# Patient Record
Sex: FEMALE | Race: WHITE | NOT HISPANIC OR LATINO | Employment: FULL TIME | RURAL
[De-identification: names, ages, dates, MRNs, and addresses within clinical notes are randomized per-mention and may not be internally consistent; named-entity substitution may affect disease eponyms.]

---

## 2017-12-10 ENCOUNTER — OFFICE VISIT (OUTPATIENT)
Dept: RETAIL CLINIC | Facility: CLINIC | Age: 27
End: 2017-12-10

## 2017-12-10 DIAGNOSIS — J06.9 ACUTE URI: Primary | ICD-10-CM

## 2017-12-10 LAB
EXPIRATION DATE: NORMAL
EXPIRATION DATE: NORMAL
FLUAV AG NPH QL: NORMAL
FLUBV AG NPH QL: NORMAL
INTERNAL CONTROL: NORMAL
INTERNAL CONTROL: NORMAL
Lab: NORMAL
Lab: NORMAL
S PYO AG THROAT QL: NEGATIVE

## 2017-12-10 PROCEDURE — 99213 OFFICE O/P EST LOW 20 MIN: CPT | Performed by: NURSE PRACTITIONER

## 2017-12-10 PROCEDURE — 87880 STREP A ASSAY W/OPTIC: CPT | Performed by: NURSE PRACTITIONER

## 2017-12-10 PROCEDURE — 87804 INFLUENZA ASSAY W/OPTIC: CPT | Performed by: NURSE PRACTITIONER

## 2017-12-10 NOTE — PROGRESS NOTES
Subjective   Dominique De Dios is a 27 y.o. female.   Chief Complaint   Patient presents with   • Generalized Body Aches   • Fever   • Nausea   • Cough      Fever    This is a new problem. The current episode started yesterday. The problem occurs intermittently. The problem has been waxing and waning. The maximum temperature noted was 103 to 103.9 F. Associated symptoms include congestion, coughing, headaches, muscle aches, nausea and a sore throat. She has tried NSAIDs and acetaminophen for the symptoms. The treatment provided mild relief.   Nausea   This is a new problem. The current episode started yesterday. The problem occurs intermittently. The problem has been waxing and waning. Associated symptoms include congestion, coughing, fatigue, a fever, headaches, myalgias, nausea and a sore throat. She has tried nothing for the symptoms. The treatment provided no relief.   Cough   This is a new problem. The current episode started yesterday. The problem has been gradually worsening. The problem occurs every few minutes. The cough is non-productive. Associated symptoms include a fever, headaches, myalgias, postnasal drip, rhinorrhea and a sore throat. Nothing aggravates the symptoms. She has tried nothing for the symptoms.        The following portions of the patient's history were reviewed and updated as appropriate: allergies, current medications, past family history, past medical history, past social history, past surgical history and problem list.    Review of Systems   Constitutional: Positive for fatigue and fever.   HENT: Positive for congestion, postnasal drip, rhinorrhea and sore throat.    Eyes: Negative.    Respiratory: Positive for cough.    Gastrointestinal: Positive for nausea.   Genitourinary: Negative.    Musculoskeletal: Positive for myalgias.   Skin: Negative.    Allergic/Immunologic: Negative.    Neurological: Positive for headaches.   Psychiatric/Behavioral: Negative.    All other systems reviewed and  are negative.      Objective   Allergies   Allergen Reactions   • Ceclor [Cefaclor] Rash     childhood       Physical Exam   Constitutional: She is oriented to person, place, and time. She appears well-developed and well-nourished. She appears ill.   HENT:   Right Ear: Tympanic membrane normal.   Left Ear: Tympanic membrane normal.   Nose: Mucosal edema present.   Mouth/Throat: Posterior oropharyngeal erythema present. No oropharyngeal exudate.   Eyes: Pupils are equal, round, and reactive to light.   Neck: Neck supple.   Cardiovascular: Normal rate and regular rhythm.    Pulmonary/Chest: Effort normal and breath sounds normal.   Abdominal: Soft. Bowel sounds are normal.   Musculoskeletal: Normal range of motion.   Lymphadenopathy:     She has no cervical adenopathy.   Neurological: She is alert and oriented to person, place, and time.   Skin: Skin is warm and dry.   Psychiatric: She has a normal mood and affect.   Vitals reviewed.      Assessment/Plan   Dominique was seen today for generalized body aches, fever, nausea and cough.    Diagnoses and all orders for this visit:    Acute URI  -     POC Influenza A / B  -     POC Rapid Strep A      Results for orders placed or performed in visit on 12/10/17   POC Influenza A / B   Result Value Ref Range    Rapid Influenza A Ag neg     Rapid Influenza B Ag neg     Internal Control Passed Passed    Lot Number [id number]     Expiration Date 11/2018    POC Rapid Strep A   Result Value Ref Range    Rapid Strep A Screen Negative Negative, VALID, INVALID, Not Performed    Internal Control Passed Passed    Lot Number [id number]     Expiration Date 02/28/2019            Declined prescriptions for symptom relief.       This document has been electronically signed by KANDY Constantino December 10, 2017 1:50 PM

## 2017-12-10 NOTE — PATIENT INSTRUCTIONS
Viral Respiratory Infection  A viral respiratory infection is an illness that affects parts of the body used for breathing, like the lungs, nose, and throat. It is caused by a germ called a virus.  Some examples of this kind of infection are:  · A cold.  · The flu (influenza).  · A respiratory syncytial virus (RSV) infection.  HOW DO I KNOW IF I HAVE THIS INFECTION?  Most of the time this infection causes:  · A stuffy or runny nose.  · Yellow or green fluid in the nose.  · A cough.  · Sneezing.  · Tiredness (fatigue).  · Achy muscles.  · A sore throat.  · Sweating or chills.  · A fever.  · A headache.  HOW IS THIS INFECTION TREATED?  If the flu is diagnosed early, it may be treated with an antiviral medicine. This medicine shortens the length of time a person has symptoms. Symptoms may be treated with over-the-counter and prescription medicines, such as:  · Expectorants. These make it easier to cough up mucus.  · Decongestant nasal sprays.  Doctors do not prescribe antibiotic medicines for viral infections. They do not work with this kind of infection.  HOW DO I KNOW IF I SHOULD STAY HOME?  To keep others from getting sick, stay home if you have:  · A fever.  · A lasting cough.  · A sore throat.  · A runny nose.  · Sneezing.  · Muscles aches.  · Headaches.  · Tiredness.  · Weakness.  · Chills.  · Sweating.  · An upset stomach (nausea).  HOME CARE   · Rest as much as possible.  · Take over-the-counter and prescription medicines only as told by your doctor.  · Drink enough fluid to keep your pee (urine) clear or pale yellow.  · Gargle with salt water. Do this 3-4 times per day or as needed. To make a salt-water mixture, dissolve ½-1 tsp of salt in 1 cup of warm water. Make sure the salt dissolves all the way.  · Use nose drops made from salt water. This helps with stuffiness (congestion). It also helps soften the skin around your nose.  · Do not drink alcohol.  · Do not use tobacco products, including cigarettes,  chewing tobacco, and e-cigarettes. If you need help quitting, ask your doctor.  GET HELP IF:  · Your symptoms last for 10 days or longer.  · Your symptoms get worse over time.  · You have a fever.  · You have very bad pain in your face or forehead.  · Parts of your jaw or neck become very swollen.  GET HELP RIGHT AWAY IF:  · You feel pain or pressure in your chest.  · You have shortness of breath.  · You faint or feel like you will faint.  · You keep throwing up (vomiting).  · You feel confused.     This information is not intended to replace advice given to you by your health care provider. Make sure you discuss any questions you have with your health care provider.     Document Released: 11/30/2009 Document Revised: 04/10/2017 Document Reviewed: 05/25/2016  [employer] Interactive Patient Education ©2017 [employer]

## 2019-02-26 ENCOUNTER — TRANSCRIBE ORDERS (OUTPATIENT)
Dept: ADMINISTRATIVE | Facility: HOSPITAL | Age: 29
End: 2019-02-26

## 2019-02-26 DIAGNOSIS — R10.9 ABDOMINAL PAIN, UNSPECIFIED ABDOMINAL LOCATION: Primary | ICD-10-CM

## 2019-03-20 ENCOUNTER — APPOINTMENT (OUTPATIENT)
Dept: CT IMAGING | Facility: HOSPITAL | Age: 29
End: 2019-03-20

## 2019-03-20 ENCOUNTER — HOSPITAL ENCOUNTER (OUTPATIENT)
Facility: HOSPITAL | Age: 29
Setting detail: OBSERVATION
Discharge: HOME | End: 2019-03-21
Attending: FAMILY MEDICINE | Admitting: FAMILY MEDICINE

## 2019-03-20 DIAGNOSIS — R55 SYNCOPE, UNSPECIFIED SYNCOPE TYPE: Primary | ICD-10-CM

## 2019-03-20 LAB
6-ACETYL MORPHINE: NEGATIVE
A-A DO2: 28.5 MMHG (ref 0–300)
ALBUMIN SERPL-MCNC: 4.54 G/DL (ref 3.5–5.2)
ALBUMIN SERPL-MCNC: 4.82 G/DL (ref 3.5–5.2)
ALBUMIN/GLOB SERPL: 1.5 G/DL
ALBUMIN/GLOB SERPL: 1.6 G/DL
ALP SERPL-CCNC: 42 U/L (ref 39–117)
ALP SERPL-CCNC: 46 U/L (ref 39–117)
ALT SERPL W P-5'-P-CCNC: 9 U/L (ref 1–33)
ALT SERPL W P-5'-P-CCNC: 9 U/L (ref 1–33)
AMPHET+METHAMPHET UR QL: NEGATIVE
ANION GAP SERPL CALCULATED.3IONS-SCNC: 14.2 MMOL/L
ANION GAP SERPL CALCULATED.3IONS-SCNC: 16.6 MMOL/L
APTT PPP: 26.4 SECONDS (ref 23.8–36.1)
ARTERIAL PATENCY WRIST A: ABNORMAL
AST SERPL-CCNC: 16 U/L (ref 1–32)
AST SERPL-CCNC: 18 U/L (ref 1–32)
ATMOSPHERIC PRESS: 733 MMHG
BARBITURATES UR QL SCN: NEGATIVE
BASE EXCESS BLDA CALC-SCNC: -1.3 MMOL/L
BASOPHILS # BLD AUTO: 0.02 10*3/MM3 (ref 0–0.2)
BASOPHILS NFR BLD AUTO: 0.2 % (ref 0–1.5)
BDY SITE: ABNORMAL
BENZODIAZ UR QL SCN: NEGATIVE
BILIRUB SERPL-MCNC: 0.4 MG/DL (ref 0.2–1.2)
BILIRUB SERPL-MCNC: 0.4 MG/DL (ref 0.2–1.2)
BILIRUB UR QL STRIP: NEGATIVE
BODY TEMPERATURE: 98.6 C
BUN BLD-MCNC: 9 MG/DL (ref 6–20)
BUN BLD-MCNC: 9 MG/DL (ref 6–20)
BUN/CREAT SERPL: 10.7 (ref 7–25)
BUN/CREAT SERPL: 11.1 (ref 7–25)
BUPRENORPHINE SERPL-MCNC: NEGATIVE NG/ML
CALCIUM SPEC-SCNC: 9.5 MG/DL (ref 8.6–10.5)
CALCIUM SPEC-SCNC: 9.6 MG/DL (ref 8.6–10.5)
CANNABINOIDS SERPL QL: NEGATIVE
CHLORIDE SERPL-SCNC: 100 MMOL/L (ref 98–107)
CHLORIDE SERPL-SCNC: 103 MMOL/L (ref 98–107)
CLARITY UR: CLEAR
CO2 SERPL-SCNC: 20.4 MMOL/L (ref 22–29)
CO2 SERPL-SCNC: 20.8 MMOL/L (ref 22–29)
COCAINE UR QL: NEGATIVE
COHGB MFR BLD: 0.4 % (ref 0–5)
COLOR UR: YELLOW
CREAT BLD-MCNC: 0.81 MG/DL (ref 0.57–1)
CREAT BLD-MCNC: 0.84 MG/DL (ref 0.57–1)
CRP SERPL-MCNC: 0.45 MG/DL (ref 0–0.5)
DEPRECATED RDW RBC AUTO: 40.8 FL (ref 37–54)
EOSINOPHIL # BLD AUTO: 0.01 10*3/MM3 (ref 0–0.4)
EOSINOPHIL NFR BLD AUTO: 0.1 % (ref 0.3–6.2)
ERYTHROCYTE [DISTWIDTH] IN BLOOD BY AUTOMATED COUNT: 12.3 % (ref 12.3–15.4)
GFR SERPL CREATININE-BSD FRML MDRD: 81 ML/MIN/1.73
GFR SERPL CREATININE-BSD FRML MDRD: 84 ML/MIN/1.73
GLOBULIN UR ELPH-MCNC: 3 GM/DL
GLOBULIN UR ELPH-MCNC: 3.1 GM/DL
GLUCOSE BLD-MCNC: 102 MG/DL (ref 65–99)
GLUCOSE BLD-MCNC: 116 MG/DL (ref 65–99)
GLUCOSE UR STRIP-MCNC: NEGATIVE MG/DL
HCG SERPL QL: NEGATIVE
HCO3 BLDA-SCNC: 21 MMOL/L (ref 22–26)
HCT VFR BLD AUTO: 42.8 % (ref 34–46.6)
HCT VFR BLD CALC: 41 % (ref 37–47)
HGB BLD-MCNC: 14.4 G/DL (ref 12–15.9)
HGB BLDA-MCNC: 14.1 G/DL (ref 12–16)
HGB UR QL STRIP.AUTO: NEGATIVE
HOROWITZ INDEX BLD+IHG-RTO: 21 %
IMM GRANULOCYTES # BLD AUTO: 0.01 10*3/MM3 (ref 0–0.05)
IMM GRANULOCYTES NFR BLD AUTO: 0.1 % (ref 0–0.5)
INR PPP: 0.96 (ref 0.9–1.1)
KETONES UR QL STRIP: ABNORMAL
LEUKOCYTE ESTERASE UR QL STRIP.AUTO: NEGATIVE
LIPASE SERPL-CCNC: 34 U/L (ref 13–60)
LYMPHOCYTES # BLD AUTO: 1.92 10*3/MM3 (ref 0.7–3.1)
LYMPHOCYTES NFR BLD AUTO: 18.4 % (ref 19.6–45.3)
MCH RBC QN AUTO: 30.5 PG (ref 26.6–33)
MCHC RBC AUTO-ENTMCNC: 33.6 G/DL (ref 31.5–35.7)
MCV RBC AUTO: 90.7 FL (ref 79–97)
METHADONE UR QL SCN: NEGATIVE
METHGB BLD QL: 0.4 % (ref 0–3)
MODALITY: ABNORMAL
MONOCYTES # BLD AUTO: 0.47 10*3/MM3 (ref 0.1–0.9)
MONOCYTES NFR BLD AUTO: 4.5 % (ref 5–12)
NEUTROPHILS # BLD AUTO: 8 10*3/MM3 (ref 1.4–7)
NEUTROPHILS NFR BLD AUTO: 76.7 % (ref 42.7–76)
NITRITE UR QL STRIP: NEGATIVE
OPIATES UR QL: NEGATIVE
OXYCODONE UR QL SCN: NEGATIVE
OXYHGB MFR BLDV: 95 % (ref 85–100)
PCO2 BLDA: 29.1 MM HG (ref 35–45)
PCP UR QL SCN: NEGATIVE
PH BLDA: 7.48 PH UNITS (ref 7.35–7.45)
PH UR STRIP.AUTO: 7.5 [PH] (ref 5–8)
PLATELET # BLD AUTO: 343 10*3/MM3 (ref 140–450)
PMV BLD AUTO: 10 FL (ref 6–12)
PO2 BLDA: 80.7 MM HG (ref 80–100)
POTASSIUM BLD-SCNC: 3.5 MMOL/L (ref 3.5–5.2)
POTASSIUM BLD-SCNC: 3.5 MMOL/L (ref 3.5–5.2)
PROT SERPL-MCNC: 7.5 G/DL (ref 6–8.5)
PROT SERPL-MCNC: 7.9 G/DL (ref 6–8.5)
PROT UR QL STRIP: NEGATIVE
PROTHROMBIN TIME: 13 SECONDS (ref 11–15.4)
RBC # BLD AUTO: 4.72 10*6/MM3 (ref 3.77–5.28)
SAO2 % BLDCOA: 95.8 % (ref 90–100)
SODIUM BLD-SCNC: 137 MMOL/L (ref 136–145)
SODIUM BLD-SCNC: 138 MMOL/L (ref 136–145)
SP GR UR STRIP: 1.01 (ref 1–1.03)
TROPONIN T SERPL-MCNC: <0.01 NG/ML (ref 0–0.03)
TROPONIN T SERPL-MCNC: <0.01 NG/ML (ref 0–0.03)
TSH SERPL DL<=0.05 MIU/L-ACNC: 1.61 MIU/ML (ref 0.27–4.2)
UROBILINOGEN UR QL STRIP: ABNORMAL
WBC NRBC COR # BLD: 10.43 10*3/MM3 (ref 3.4–10.8)

## 2019-03-20 PROCEDURE — 81003 URINALYSIS AUTO W/O SCOPE: CPT | Performed by: FAMILY MEDICINE

## 2019-03-20 PROCEDURE — 93010 ELECTROCARDIOGRAM REPORT: CPT | Performed by: INTERNAL MEDICINE

## 2019-03-20 PROCEDURE — 80307 DRUG TEST PRSMV CHEM ANLYZR: CPT | Performed by: FAMILY MEDICINE

## 2019-03-20 PROCEDURE — 85025 COMPLETE CBC W/AUTO DIFF WBC: CPT | Performed by: FAMILY MEDICINE

## 2019-03-20 PROCEDURE — 86140 C-REACTIVE PROTEIN: CPT | Performed by: FAMILY MEDICINE

## 2019-03-20 PROCEDURE — 83050 HGB METHEMOGLOBIN QUAN: CPT | Performed by: FAMILY MEDICINE

## 2019-03-20 PROCEDURE — 83690 ASSAY OF LIPASE: CPT | Performed by: FAMILY MEDICINE

## 2019-03-20 PROCEDURE — 36600 WITHDRAWAL OF ARTERIAL BLOOD: CPT | Performed by: FAMILY MEDICINE

## 2019-03-20 PROCEDURE — 82805 BLOOD GASES W/O2 SATURATION: CPT | Performed by: FAMILY MEDICINE

## 2019-03-20 PROCEDURE — 94799 UNLISTED PULMONARY SVC/PX: CPT

## 2019-03-20 PROCEDURE — 82375 ASSAY CARBOXYHB QUANT: CPT | Performed by: FAMILY MEDICINE

## 2019-03-20 PROCEDURE — 25010000002 ONDANSETRON PER 1 MG: Performed by: FAMILY MEDICINE

## 2019-03-20 PROCEDURE — G0378 HOSPITAL OBSERVATION PER HR: HCPCS

## 2019-03-20 PROCEDURE — 70450 CT HEAD/BRAIN W/O DYE: CPT | Performed by: RADIOLOGY

## 2019-03-20 PROCEDURE — 25010000002 ENOXAPARIN PER 10 MG: Performed by: PHYSICIAN ASSISTANT

## 2019-03-20 PROCEDURE — 80053 COMPREHEN METABOLIC PANEL: CPT | Performed by: FAMILY MEDICINE

## 2019-03-20 PROCEDURE — 96376 TX/PRO/DX INJ SAME DRUG ADON: CPT

## 2019-03-20 PROCEDURE — 84443 ASSAY THYROID STIM HORMONE: CPT | Performed by: FAMILY MEDICINE

## 2019-03-20 PROCEDURE — 96361 HYDRATE IV INFUSION ADD-ON: CPT

## 2019-03-20 PROCEDURE — 93005 ELECTROCARDIOGRAM TRACING: CPT | Performed by: FAMILY MEDICINE

## 2019-03-20 PROCEDURE — 96375 TX/PRO/DX INJ NEW DRUG ADDON: CPT

## 2019-03-20 PROCEDURE — 25010000002 LORAZEPAM PER 2 MG: Performed by: FAMILY MEDICINE

## 2019-03-20 PROCEDURE — 70450 CT HEAD/BRAIN W/O DYE: CPT

## 2019-03-20 PROCEDURE — 99285 EMERGENCY DEPT VISIT HI MDM: CPT

## 2019-03-20 PROCEDURE — 84703 CHORIONIC GONADOTROPIN ASSAY: CPT | Performed by: FAMILY MEDICINE

## 2019-03-20 PROCEDURE — 85730 THROMBOPLASTIN TIME PARTIAL: CPT | Performed by: FAMILY MEDICINE

## 2019-03-20 PROCEDURE — 93005 ELECTROCARDIOGRAM TRACING: CPT | Performed by: EMERGENCY MEDICINE

## 2019-03-20 PROCEDURE — 25010000002 LORAZEPAM PER 2 MG: Performed by: PHYSICIAN ASSISTANT

## 2019-03-20 PROCEDURE — 96374 THER/PROPH/DIAG INJ IV PUSH: CPT

## 2019-03-20 PROCEDURE — 85610 PROTHROMBIN TIME: CPT | Performed by: FAMILY MEDICINE

## 2019-03-20 PROCEDURE — 84484 ASSAY OF TROPONIN QUANT: CPT | Performed by: FAMILY MEDICINE

## 2019-03-20 RX ORDER — ONDANSETRON 2 MG/ML
4 INJECTION INTRAMUSCULAR; INTRAVENOUS ONCE
Status: COMPLETED | OUTPATIENT
Start: 2019-03-20 | End: 2019-03-20

## 2019-03-20 RX ORDER — ONDANSETRON 4 MG/1
4 TABLET, FILM COATED ORAL EVERY 6 HOURS PRN
Status: DISCONTINUED | OUTPATIENT
Start: 2019-03-20 | End: 2019-03-21 | Stop reason: HOSPADM

## 2019-03-20 RX ORDER — ONDANSETRON 2 MG/ML
4 INJECTION INTRAMUSCULAR; INTRAVENOUS EVERY 6 HOURS PRN
Status: DISCONTINUED | OUTPATIENT
Start: 2019-03-20 | End: 2019-03-21 | Stop reason: HOSPADM

## 2019-03-20 RX ORDER — LORAZEPAM 2 MG/ML
0.25 INJECTION INTRAMUSCULAR ONCE
Status: COMPLETED | OUTPATIENT
Start: 2019-03-20 | End: 2019-03-20

## 2019-03-20 RX ORDER — ONDANSETRON 4 MG/1
4 TABLET, ORALLY DISINTEGRATING ORAL EVERY 6 HOURS PRN
Status: DISCONTINUED | OUTPATIENT
Start: 2019-03-20 | End: 2019-03-21 | Stop reason: HOSPADM

## 2019-03-20 RX ORDER — SODIUM CHLORIDE 0.9 % (FLUSH) 0.9 %
10 SYRINGE (ML) INJECTION AS NEEDED
Status: DISCONTINUED | OUTPATIENT
Start: 2019-03-20 | End: 2019-03-21 | Stop reason: HOSPADM

## 2019-03-20 RX ORDER — FAMOTIDINE 20 MG/1
20 TABLET, FILM COATED ORAL 2 TIMES DAILY PRN
Status: DISCONTINUED | OUTPATIENT
Start: 2019-03-20 | End: 2019-03-21 | Stop reason: HOSPADM

## 2019-03-20 RX ORDER — SODIUM CHLORIDE 0.9 % (FLUSH) 0.9 %
3 SYRINGE (ML) INJECTION EVERY 12 HOURS SCHEDULED
Status: DISCONTINUED | OUTPATIENT
Start: 2019-03-20 | End: 2019-03-21 | Stop reason: HOSPADM

## 2019-03-20 RX ORDER — SODIUM CHLORIDE 9 MG/ML
125 INJECTION, SOLUTION INTRAVENOUS CONTINUOUS
Status: DISCONTINUED | OUTPATIENT
Start: 2019-03-20 | End: 2019-03-21 | Stop reason: HOSPADM

## 2019-03-20 RX ORDER — SODIUM CHLORIDE 0.9 % (FLUSH) 0.9 %
3-10 SYRINGE (ML) INJECTION AS NEEDED
Status: DISCONTINUED | OUTPATIENT
Start: 2019-03-20 | End: 2019-03-21 | Stop reason: HOSPADM

## 2019-03-20 RX ORDER — NITROGLYCERIN 0.4 MG/1
0.4 TABLET SUBLINGUAL
Status: DISCONTINUED | OUTPATIENT
Start: 2019-03-20 | End: 2019-03-21 | Stop reason: HOSPADM

## 2019-03-20 RX ORDER — LORAZEPAM 2 MG/ML
0.5 INJECTION INTRAMUSCULAR EVERY 6 HOURS PRN
Status: DISCONTINUED | OUTPATIENT
Start: 2019-03-20 | End: 2019-03-21 | Stop reason: HOSPADM

## 2019-03-20 RX ADMIN — SODIUM CHLORIDE 1000 ML: 9 INJECTION, SOLUTION INTRAVENOUS at 15:06

## 2019-03-20 RX ADMIN — ENOXAPARIN SODIUM 40 MG: 40 INJECTION SUBCUTANEOUS at 20:31

## 2019-03-20 RX ADMIN — ONDANSETRON 4 MG: 2 INJECTION, SOLUTION INTRAMUSCULAR; INTRAVENOUS at 15:08

## 2019-03-20 RX ADMIN — SODIUM CHLORIDE 125 ML/HR: 9 INJECTION, SOLUTION INTRAVENOUS at 15:57

## 2019-03-20 RX ADMIN — LORAZEPAM 0.25 MG: 2 INJECTION INTRAMUSCULAR; INTRAVENOUS at 15:11

## 2019-03-20 RX ADMIN — LORAZEPAM 0.5 MG: 2 INJECTION INTRAMUSCULAR; INTRAVENOUS at 20:31

## 2019-03-20 NOTE — PLAN OF CARE
Problem: Syncope (Adult)  Goal: Identify Related Risk Factors and Signs and Symptoms  Outcome: Ongoing (interventions implemented as appropriate)    Goal: Physical Safety/Health Maintenance  Outcome: Ongoing (interventions implemented as appropriate)    Goal: Optimal Emotional/Functional Collins  Outcome: Ongoing (interventions implemented as appropriate)      Problem: Patient Care Overview  Goal: Plan of Care Review  Outcome: Ongoing (interventions implemented as appropriate)    Goal: Individualization and Mutuality  Outcome: Ongoing (interventions implemented as appropriate)    Goal: Discharge Needs Assessment  Outcome: Ongoing (interventions implemented as appropriate)    Goal: Interprofessional Rounds/Family Conf  Outcome: Ongoing (interventions implemented as appropriate)

## 2019-03-20 NOTE — ED PROVIDER NOTES
Subjective   29 yo female presents to ED after 2 witnessed syncopal episodes.  Mother reports that pt has syncopal episodes  Already - vasovagal. Mother says that pt is under extreme stress at home -  , work and has a 3 yo, but these events are becoming more frequent and pt today she says is not acting like her self- acting confused. Mother notes thyroid nodule which is new; pt states that yesterday she went to be evaluated by the school nurse for a sore throat - strep was negative.  Pt is very tearful. Pt has an episode  And pt is very tearful when she wakes up. Mother and sister report that she has been having these episodes of passing out for years but it had stopped last one was 2 years ago.-- when she was pregnant. Mother says when she was a small child she took her to a pediatric cardiologist who told her that her workup was unremarkable.        History provided by:  Patient and relative  Syncope   Episode history:  Multiple  Most recent episode:  Today  Timing:  Intermittent  Progression:  Partially resolved  Chronicity:  Recurrent  Context: exertion and sitting down    Witnessed: yes    Relieved by:  Bed rest  Worsened by:  Nothing  Ineffective treatments:  Bed rest  Associated symptoms: anxiety and dizziness    Associated symptoms: no chest pain and no fever    Risk factors: no congenital heart disease, no coronary artery disease, no seizures and no vascular disease        Review of Systems   Constitutional: Negative.  Negative for fever.   HENT: Negative.    Respiratory: Negative.    Cardiovascular: Positive for syncope. Negative for chest pain.   Gastrointestinal: Negative.  Negative for abdominal pain.   Endocrine: Negative.    Genitourinary: Negative.  Negative for dysuria.   Skin: Negative.    Neurological: Positive for dizziness.   Psychiatric/Behavioral: Negative.    All other systems reviewed and are negative.      Past Medical History:   Diagnosis Date   • Acid reflux    • Anxiety    •  Migraine    • Migraines    • Urinary tract infection        Allergies   Allergen Reactions   • Ceclor [Cefaclor] Rash     childhood       Past Surgical History:   Procedure Laterality Date   •  SECTION Bilateral 2016    Procedure:  SECTION PRIMARY;  Surgeon: Valdez Bach DO;  Location:  [facility];  Service:        Family History   Problem Relation Age of Onset   • Stroke Father        Social History     Socioeconomic History   • Marital status:      Spouse name: Not on file   • Number of children: Not on file   • Years of education: Not on file   • Highest education level: Not on file   Tobacco Use   • Smoking status: Never Smoker   • Smokeless tobacco: Never Used   Substance and Sexual Activity   • Alcohol use: No   • Drug use: No   • Sexual activity: Yes     Partners: Male           Objective   Physical Exam   Constitutional: She appears well-developed and well-nourished.   HENT:   Head: Normocephalic and atraumatic.   Mouth/Throat: Oropharynx is clear and moist.   Eyes: Pupils are equal, round, and reactive to light.   Neck:   Palpable thyroid enlargement on left   Cardiovascular: Normal rate and regular rhythm.   Pulmonary/Chest: Effort normal and breath sounds normal.   Abdominal: Soft. Bowel sounds are normal.   Musculoskeletal: Normal range of motion.   Neurological: She is alert. GCS eye subscore is 4. GCS verbal subscore is 4.   Skin: Skin is warm. Capillary refill takes less than 2 seconds.   Psychiatric: Her mood appears anxious.   Nursing note and vitals reviewed.      Procedures           ED Course  ED Course as of Mar 21 0000   Wed Mar 20, 2019   1355 Time 1355 sinus tachycardia 101 bpm QRS duration is 76 QT is 362 QTC is 469 nonspecific T wave changes no evidence of acute ST elevation at this time  []   1636 TSH Baseline: 1.610 []      ED Course User Index  [] Marina Trinh DO MDM  Number of Diagnoses or Management  Options  Syncope, unspecified syncope type: new and requires workup     Amount and/or Complexity of Data Reviewed  Clinical lab tests: ordered and reviewed  Tests in the radiology section of CPT®: ordered and reviewed  Tests in the medicine section of CPT®: reviewed and ordered  Discuss the patient with other providers: yes  Independent visualization of images, tracings, or specimens: yes    Risk of Complications, Morbidity, and/or Mortality  Presenting problems: high  Diagnostic procedures: high  Management options: high    Patient Progress  Patient progress: (guarded)        Final diagnoses:   Syncope, unspecified syncope type            Marina Trinh DO  03/21/19 0000

## 2019-03-20 NOTE — ED NOTES
Ns noted, vs stable, pt alert and oriented, skin pwd, no respiratory distress, no seizure activity noted, no syncope while in ed. Pt denies pain, pt resting on stretcher, poc updated, fall precautions maintained.     Verito Romero, MARSHAL  03/20/19 2707

## 2019-03-21 ENCOUNTER — APPOINTMENT (OUTPATIENT)
Dept: ULTRASOUND IMAGING | Facility: HOSPITAL | Age: 29
End: 2019-03-21

## 2019-03-21 ENCOUNTER — APPOINTMENT (OUTPATIENT)
Dept: CARDIOLOGY | Facility: HOSPITAL | Age: 29
End: 2019-03-21

## 2019-03-21 VITALS
OXYGEN SATURATION: 98 % | HEART RATE: 56 BPM | DIASTOLIC BLOOD PRESSURE: 70 MMHG | WEIGHT: 100 LBS | TEMPERATURE: 98.8 F | HEIGHT: 65 IN | SYSTOLIC BLOOD PRESSURE: 134 MMHG | RESPIRATION RATE: 18 BRPM | BODY MASS INDEX: 16.66 KG/M2

## 2019-03-21 PROBLEM — R55 SYNCOPE: Status: RESOLVED | Noted: 2019-03-20 | Resolved: 2019-03-21

## 2019-03-21 LAB
ALBUMIN SERPL-MCNC: 3.21 G/DL (ref 3.5–5.2)
ALBUMIN/GLOB SERPL: 1.5 G/DL
ALP SERPL-CCNC: 32 U/L (ref 39–117)
ALT SERPL W P-5'-P-CCNC: 6 U/L (ref 1–33)
ANION GAP SERPL CALCULATED.3IONS-SCNC: 9.3 MMOL/L
AST SERPL-CCNC: 12 U/L (ref 1–32)
BASOPHILS # BLD AUTO: 0.03 10*3/MM3 (ref 0–0.2)
BASOPHILS NFR BLD AUTO: 0.5 % (ref 0–1.5)
BH CV ECHO MEAS - % IVS THICK: 68.1 %
BH CV ECHO MEAS - % LVPW THICK: 32.9 %
BH CV ECHO MEAS - ACS: 1.7 CM
BH CV ECHO MEAS - AO ROOT AREA (BSA CORRECTED): 1.6
BH CV ECHO MEAS - AO ROOT AREA: 4.4 CM^2
BH CV ECHO MEAS - AO ROOT DIAM: 2.4 CM
BH CV ECHO MEAS - BSA(HAYCOCK): 1.4 M^2
BH CV ECHO MEAS - BSA: 1.5 M^2
BH CV ECHO MEAS - BZI_BMI: 16.6 KILOGRAMS/M^2
BH CV ECHO MEAS - BZI_METRIC_HEIGHT: 165.1 CM
BH CV ECHO MEAS - BZI_METRIC_WEIGHT: 45.4 KG
BH CV ECHO MEAS - EDV(CUBED): 63.5 ML
BH CV ECHO MEAS - EDV(MOD-SP4): 35 ML
BH CV ECHO MEAS - EDV(TEICH): 69.6 ML
BH CV ECHO MEAS - EF(CUBED): 71.3 %
BH CV ECHO MEAS - EF(MOD-SP4): 77.1 %
BH CV ECHO MEAS - EF(TEICH): 63.6 %
BH CV ECHO MEAS - ESV(CUBED): 18.2 ML
BH CV ECHO MEAS - ESV(MOD-SP4): 8 ML
BH CV ECHO MEAS - ESV(TEICH): 25.3 ML
BH CV ECHO MEAS - FS: 34.1 %
BH CV ECHO MEAS - IVS/LVPW: 1
BH CV ECHO MEAS - IVSD: 0.7 CM
BH CV ECHO MEAS - IVSS: 1.2 CM
BH CV ECHO MEAS - LA DIMENSION: 2.4 CM
BH CV ECHO MEAS - LA/AO: 1
BH CV ECHO MEAS - LV DIASTOLIC VOL/BSA (35-75): 23.8 ML/M^2
BH CV ECHO MEAS - LV MASS(C)D: 78.5 GRAMS
BH CV ECHO MEAS - LV MASS(C)DI: 53.3 GRAMS/M^2
BH CV ECHO MEAS - LV MASS(C)S: 74.5 GRAMS
BH CV ECHO MEAS - LV MASS(C)SI: 50.6 GRAMS/M^2
BH CV ECHO MEAS - LV SYSTOLIC VOL/BSA (12-30): 5.4 ML/M^2
BH CV ECHO MEAS - LVIDD: 4 CM
BH CV ECHO MEAS - LVIDS: 2.6 CM
BH CV ECHO MEAS - LVLD AP4: 8 CM
BH CV ECHO MEAS - LVLS AP4: 5.6 CM
BH CV ECHO MEAS - LVOT AREA (M): 2.3 CM^2
BH CV ECHO MEAS - LVOT AREA: 2.4 CM^2
BH CV ECHO MEAS - LVOT DIAM: 1.7 CM
BH CV ECHO MEAS - LVPWD: 0.7 CM
BH CV ECHO MEAS - LVPWS: 0.93 CM
BH CV ECHO MEAS - MV A MAX VEL: 77 CM/SEC
BH CV ECHO MEAS - MV E MAX VEL: 106.1 CM/SEC
BH CV ECHO MEAS - MV E/A: 1.4
BH CV ECHO MEAS - PA ACC SLOPE: 925.2 CM/SEC^2
BH CV ECHO MEAS - PA ACC TIME: 0.14 SEC
BH CV ECHO MEAS - PA PR(ACCEL): 15.6 MMHG
BH CV ECHO MEAS - RVDD: 1.3 CM
BH CV ECHO MEAS - SI(CUBED): 30.8 ML/M^2
BH CV ECHO MEAS - SI(MOD-SP4): 18.3 ML/M^2
BH CV ECHO MEAS - SI(TEICH): 30 ML/M^2
BH CV ECHO MEAS - SV(CUBED): 45.3 ML
BH CV ECHO MEAS - SV(MOD-SP4): 27 ML
BH CV ECHO MEAS - SV(TEICH): 44.3 ML
BILIRUB SERPL-MCNC: 0.2 MG/DL (ref 0.2–1.2)
BUN BLD-MCNC: 10 MG/DL (ref 6–20)
BUN/CREAT SERPL: 13.2 (ref 7–25)
CALCIUM SPEC-SCNC: 8 MG/DL (ref 8.6–10.5)
CHLORIDE SERPL-SCNC: 109 MMOL/L (ref 98–107)
CHOLEST SERPL-MCNC: 136 MG/DL (ref 0–200)
CO2 SERPL-SCNC: 18.7 MMOL/L (ref 22–29)
CREAT BLD-MCNC: 0.76 MG/DL (ref 0.57–1)
DEPRECATED RDW RBC AUTO: 40.9 FL (ref 37–54)
EOSINOPHIL # BLD AUTO: 0.08 10*3/MM3 (ref 0–0.4)
EOSINOPHIL NFR BLD AUTO: 1.2 % (ref 0.3–6.2)
ERYTHROCYTE [DISTWIDTH] IN BLOOD BY AUTOMATED COUNT: 12.4 % (ref 12.3–15.4)
GFR SERPL CREATININE-BSD FRML MDRD: 91 ML/MIN/1.73
GLOBULIN UR ELPH-MCNC: 2.2 GM/DL
GLUCOSE BLD-MCNC: 95 MG/DL (ref 65–99)
HCT VFR BLD AUTO: 35.7 % (ref 34–46.6)
HDLC SERPL-MCNC: 49 MG/DL (ref 40–60)
HGB BLD-MCNC: 11.4 G/DL (ref 12–15.9)
IMM GRANULOCYTES # BLD AUTO: 0.01 10*3/MM3 (ref 0–0.05)
IMM GRANULOCYTES NFR BLD AUTO: 0.2 % (ref 0–0.5)
LDLC SERPL CALC-MCNC: 75 MG/DL (ref 0–100)
LDLC/HDLC SERPL: 1.52 {RATIO}
LV EF 2D ECHO EST: 65 %
LYMPHOCYTES # BLD AUTO: 3 10*3/MM3 (ref 0.7–3.1)
LYMPHOCYTES NFR BLD AUTO: 46.7 % (ref 19.6–45.3)
MAXIMAL PREDICTED HEART RATE: 192 BPM
MCH RBC QN AUTO: 29.9 PG (ref 26.6–33)
MCHC RBC AUTO-ENTMCNC: 31.9 G/DL (ref 31.5–35.7)
MCV RBC AUTO: 93.7 FL (ref 79–97)
MONOCYTES # BLD AUTO: 0.47 10*3/MM3 (ref 0.1–0.9)
MONOCYTES NFR BLD AUTO: 7.3 % (ref 5–12)
NEUTROPHILS # BLD AUTO: 2.83 10*3/MM3 (ref 1.4–7)
NEUTROPHILS NFR BLD AUTO: 44.1 % (ref 42.7–76)
PLATELET # BLD AUTO: 244 10*3/MM3 (ref 140–450)
PMV BLD AUTO: 9.5 FL (ref 6–12)
POTASSIUM BLD-SCNC: 4 MMOL/L (ref 3.5–5.2)
PROT SERPL-MCNC: 5.4 G/DL (ref 6–8.5)
RBC # BLD AUTO: 3.81 10*6/MM3 (ref 3.77–5.28)
SODIUM BLD-SCNC: 137 MMOL/L (ref 136–145)
STRESS TARGET HR: 163 BPM
TRIGL SERPL-MCNC: 62 MG/DL (ref 0–150)
VLDLC SERPL-MCNC: 12.4 MG/DL
WBC NRBC COR # BLD: 6.42 10*3/MM3 (ref 3.4–10.8)

## 2019-03-21 PROCEDURE — 96361 HYDRATE IV INFUSION ADD-ON: CPT

## 2019-03-21 PROCEDURE — 93306 TTE W/DOPPLER COMPLETE: CPT | Performed by: INTERNAL MEDICINE

## 2019-03-21 PROCEDURE — 93306 TTE W/DOPPLER COMPLETE: CPT

## 2019-03-21 PROCEDURE — 80061 LIPID PANEL: CPT | Performed by: PHYSICIAN ASSISTANT

## 2019-03-21 PROCEDURE — G0378 HOSPITAL OBSERVATION PER HR: HCPCS

## 2019-03-21 PROCEDURE — 99243 OFF/OP CNSLTJ NEW/EST LOW 30: CPT | Performed by: NURSE PRACTITIONER

## 2019-03-21 PROCEDURE — 85025 COMPLETE CBC W/AUTO DIFF WBC: CPT | Performed by: PHYSICIAN ASSISTANT

## 2019-03-21 PROCEDURE — 93880 EXTRACRANIAL BILAT STUDY: CPT | Performed by: RADIOLOGY

## 2019-03-21 PROCEDURE — 80053 COMPREHEN METABOLIC PANEL: CPT | Performed by: PHYSICIAN ASSISTANT

## 2019-03-21 PROCEDURE — 93010 ELECTROCARDIOGRAM REPORT: CPT | Performed by: INTERNAL MEDICINE

## 2019-03-21 PROCEDURE — 93005 ELECTROCARDIOGRAM TRACING: CPT | Performed by: PHYSICIAN ASSISTANT

## 2019-03-21 PROCEDURE — 93880 EXTRACRANIAL BILAT STUDY: CPT

## 2019-03-21 RX ORDER — ATENOLOL 25 MG/1
25 TABLET ORAL
Status: DISCONTINUED | OUTPATIENT
Start: 2019-03-21 | End: 2019-03-21 | Stop reason: HOSPADM

## 2019-03-21 RX ORDER — ATENOLOL 25 MG/1
25 TABLET ORAL DAILY
Qty: 30 TABLET | Refills: 5 | Status: SHIPPED | OUTPATIENT
Start: 2019-03-21 | End: 2019-11-13 | Stop reason: SDUPTHER

## 2019-03-21 RX ADMIN — SODIUM CHLORIDE 125 ML/HR: 9 INJECTION, SOLUTION INTRAVENOUS at 09:32

## 2019-03-21 RX ADMIN — SODIUM CHLORIDE 125 ML/HR: 9 INJECTION, SOLUTION INTRAVENOUS at 00:50

## 2019-03-21 NOTE — H&P
HISTORY AND PHYSICAL        Patient Identification:  Name:  Dominique De Dios  Age:  28 y.o.  Sex:  female  :  1990  MRN:  [id number]   Visit Number:  [id number]  Primary Care Physician:  Asiya Kern APRN       Subjective     Subjective     Chief complaint:     Chief Complaint   Patient presents with   • Syncope   • Altered Mental Status       History of presenting illness:     The patient is a 28-year-old female with past medical history significant for stress, enlarged thyroid, and anxiety attacks who presented to the ED for 2 episodes of syncope.  Patient states she was at work typing when she felt confused followed by a syncopal episode.  Another similar episode happened later where she developed nausea then the syncopal episode followed by confusion.  Patient states that her hands threw up while in the state.  Patient denies any history of prior seizures.    Vital signs stable.  Cardiac enzymes negative x2 sets.  EKG revealed T wave abnormalities with repeat EKG on 3/21/2019 showing nonspecific change in ST segment in inferior leads with T wave inversion no longer evident.  Drug screen negative.  UA negative for UTI.  CT of head reveals no evidence of acute ischemic events.  Carotid ultrasound pending.    Placed in observation unit for continued monitoring.    ---------------------------------------------------------------------------------------------------------------------     Review Of Systems:    Constitutional: no fever, chills and night sweats. No appetite change or unexpected weight change. No fatigue.  Eyes: no eye drainage, itching or redness.  HEENT: Recent sore throat.  No mouth sores, dysphagia or nose bleed.  Respiratory: no for shortness of breath, cough or production of sputum.  Cardiovascular: no chest pain, no palpitations, no orthopnea.  Gastrointestinal: no nausea, vomiting or diarrhea. No abdominal pain, hematemesis or rectal bleeding.  Genitourinary: no dysuria or  polyuria.  Hematologic/lymphatic: no lymph node abnormalities, no easy bruising or easy bleeding.  Musculoskeletal: no muscle or joint pain.  Skin: No rash and no itching.  Neurological: no loss of consciousness, no seizure, no headache.  Behavioral/Psych: no depression or suicidal ideation.  Endocrine: no hot flashes.  Immunologic: negative.    ---------------------------------------------------------------------------------------------------------------------     Past Medical History    Past Medical History:   Diagnosis Date   • Acid reflux    • Anxiety    • Migraine    • Migraines    • Urinary tract infection        Past Surgical History    Past Surgical History:   Procedure Laterality Date   •  SECTION Bilateral 2016    Procedure:  SECTION PRIMARY;  Surgeon: Valdez Bach DO;  Location: [facility] LAB[facility] DELIVERY;  Service:        Family History    Family History   Problem Relation Age of Onset   • Stroke Father        Social History    Social History     Tobacco Use   • Smoking status: Never Smoker   • Smokeless tobacco: Never Used   Substance Use Topics   • Alcohol use: No   • Drug use: No       Allergies    Ceclor [cefaclor]  ---------------------------------------------------------------------------------------------------------------------     Home Medications:    Prior to Admission Medications     Prescriptions Last Dose Informant Patient Reported? Taking?    raNITIdine (ZANTAC) 150 MG tablet Unknown Self Yes No    Take 150 mg by mouth 2 (Two) Times a Day As Needed for Heartburn.        ---------------------------------------------------------------------------------------------------------------------    Objective     Objective     Hospital Scheduled Meds:    enoxaparin 40 mg Subcutaneous Q24H   sodium chloride 3 mL Intravenous Q12H       sodium chloride 125 mL/hr Last Rate: 125 mL/hr (19 5952)      ---------------------------------------------------------------------------------------------------------------------   Vital Signs:  Temp:  [97.5 °F (36.4 °C)-98.5 °F (36.9 °C)] 98.5 °F (36.9 °C)  Heart Rate:  [] 92  Resp:  [16-18] 18  BP: ()/() 117/79  Mean Arterial Pressure (Non-Invasive) for the past 24 hrs (Last 3 readings):   Noninvasive MAP (mmHg)   03/21/19 0759 94   03/21/19 0421 87   03/21/19 0045 72     SpO2 Percentage    03/21/19 0045 03/21/19 0421 03/21/19 0759   SpO2: 98% 98% 98%     SpO2:  [97 %-100 %] 98 %  on   ;   Device (Oxygen Therapy): room air    Body mass index is 16.64 kg/m².  Wt Readings from Last 3 Encounters:   03/20/19 45.4 kg (100 lb)   12/16/16 64 kg (141 lb)   12/09/16 62.1 kg (137 lb)     ---------------------------------------------------------------------------------------------------------------------     Physical Exam:    Constitutional:  Well-developed and well-nourished.  No respiratory distress.      HENT:  Head: Normocephalic and atraumatic.  Mouth:  Moist mucous membranes.    Eyes:  Conjunctivae and EOM are normal.  No scleral icterus.  Neck:  Neck supple.  No JVD present.    Cardiovascular: Tachycardia.  Normal, regular rhythm and normal heart sounds with no murmur. No edema.  Pulmonary/Chest:  No respiratory distress, no wheezes, no crackles, with normal breath sounds and good air movement.  Abdominal:  Soft.  Bowel sounds are normal.  No distension and no tenderness.   Musculoskeletal:  No edema, no tenderness, and no deformity.  No swelling or redness of joints.  Neurological:  Alert and oriented to person, place, and time.  No facial droop.  No slurred speech.   Skin:  Skin is warm and dry.  No rash noted.  No pallor.   Psychiatric:  Normal mood and affect.  Behavior is normal.    ---------------------------------------------------------------------------------------------------------------------  I have personally reviewed the EKG/Telemetry  strip  ---------------------------------------------------------------------------------------------------------------------   Results from last 7 days   Lab Units 03/20/19  1635 03/20/19  1451   TROPONIN T ng/mL <0.010 <0.010       Results from last 7 days   Lab Units 03/21/19  0242   CHOLESTEROL mg/dL 136   TRIGLYCERIDES mg/dL 62   HDL CHOL mg/dL 49   LDL CHOL mg/dL 75     Results from last 7 days   Lab Units 03/20/19  1445   PH, ARTERIAL pH units 7.477*   PO2 ART mm Hg 80.7   PCO2, ARTERIAL mm Hg 29.1*   HCO3 ART mmol/L 21.0*     Results from last 7 days   Lab Units 03/21/19  0242 03/20/19  1451 03/20/19  1450 03/20/19  1424   CRP mg/dL  --  0.45  --   --    WBC 10*3/mm3 6.42  --   --  10.43   HEMOGLOBIN g/dL 11.4*  --   --  14.4   HEMATOCRIT % 35.7  --   --  42.8   MCV fL 93.7  --   --  90.7   MCHC g/dL 31.9  --   --  33.6   PLATELETS 10*3/mm3 244  --   --  343   INR   --   --  0.96  --      Results from last 7 days   Lab Units 03/21/19  0242 03/20/19  1451 03/20/19  1424   SODIUM mmol/L 137 138 137   POTASSIUM mmol/L 4.0 3.5 3.5   CHLORIDE mmol/L 109* 103 100   CO2 mmol/L 18.7* 20.8* 20.4*   BUN mg/dL 10 9 9   CREATININE mg/dL 0.76 0.81 0.84   EGFR IF NONAFRICN AM mL/min/1.73 91 84 81   CALCIUM mg/dL 8.0* 9.5 9.6   GLUCOSE mg/dL 95 102* 116*   ALBUMIN g/dL 3.21* 4.54 4.82   BILIRUBIN mg/dL 0.2 0.4 0.4   ALK PHOS U/L 32* 42 46   AST (SGOT) U/L 12 16 18   ALT (SGPT) U/L 6 9 9   Estimated Creatinine Clearance: 79 mL/min (by C-G formula based on SCr of 0.76 mg/dL).  No results found for: AMMONIA    No results found for: HGBA1C, POCGLU  No results found for: HGBA1C  Lab Results   Component Value Date    TSH 1.610 03/20/2019                      Pain Management Panel     Pain Management Panel Latest Ref Rng & Units 3/20/2019    AMPHETAMINES SCREEN, URINE Negative Negative    BARBITURATES SCREEN Negative Negative    BENZODIAZEPINE SCREEN, URINE Negative Negative    BUPRENORPHINE Negative Negative    COCAINE SCREEN,  URINE Negative Negative    METHADONE SCREEN, URINE Negative Negative        I have personally reviewed the above laboratory results.   ---------------------------------------------------------------------------------------------------------------------  Imaging Results (last 7 days)     Procedure Component Value Units Date/Time    US Carotid Bilateral [[id number]] Updated:  03/21/19 1003    CT Head Without Contrast Stroke Protocol [[id number]] Collected:  03/20/19 1412     Updated:  03/20/19 1416    Narrative:       CT HEAD WO CONTRAST STROKE PROTOCOL-     CLINICAL INDICATION: Confusion/delirium, altered LOC, unexplained        COMPARISON: None available      TECHNIQUE: Axial images of the brain were obtained with out intravenous  contrast.  Reformatted images were created in the sagittal and coronal  planes.     DOSE:     Radiation dose reduction techniques were utilized per ALARA protocol.  Automated exposure control was initiated through either or [employer] or  [employer] software packages by  protocol.           FINDINGS:   Today's study shows no mass, hemorrhage, or midline shift.   The ventricles, cisterns, and sulci are unremarkable. There is no  hydrocephalus.   There is no evidence of acute ischemia.  I do not see epidural or subdural hematoma.  The gray-white differentiation is appropriate.   The bone window setting images show no destructive calvarial lesion or  acute calvarial fracture.   The posterior fossa is unremarkable.          Impression:       No evidence of an acute ischemic event     I have discussed the results with Dr. Trinh in the emergency department  at 2:12 PM     This report was finalized on 3/20/2019 2:14 PM by Dr. Mike Wright MD.           I have personally reviewed the above radiology results.   ---------------------------------------------------------------------------------------------------------------------      Assessment & Plan        Assessment/Plan        ASSESSMENT:    1.  Syncope    PLAN:    The patient is a 28-year-old female with past medical history significant for stress, enlarged thyroid, and anxiety attacks who presented to the ED for 2 episodes of syncope.  Patient states she was at work typing when she felt confused followed by a syncopal episode.  Another similar episode happened later where she developed nausea then the syncopal episode followed by confusion.  Patient states that her hands threw up while in the state.  Patient denies any history of prior seizures.    Vital signs stable.  Cardiac enzymes negative x2 sets.  EKG revealed T wave abnormalities with repeat EKG on 3/21/2019 showing nonspecific change in ST segment in inferior leads with T wave inversion no longer evident.  Drug screen negative.  UA negative for UTI.  CT of head reveals no evidence of acute ischemic events.  Carotid ultrasound pending.    Placed in observation unit for continued monitoring.    Placed on Lovenox 40 mg for DVT prophylaxis.    Patient discussed with neurology who recommends outpatient EEG.  Patient should refrain from driving a motor vehicle or swimming.  Patient will follow-up as outpatient with neurology after discharge.    Cardiology consulted for abnormal EKG.    Awaiting echo and carotid ultrasounds.    Patient's findings and recommendations were discussed with patient and nursing staff      Code Status:   Code Status and Medical Interventions:   Ordered at: 03/20/19 1851     Code Status:    CPR     Medical Interventions (Level of Support Prior to Arrest):    Full       Physical exam was conducted on the observation of MARSHAL Mcgregor PA-C  03/21/19  10:21 AM

## 2019-03-21 NOTE — DISCHARGE SUMMARY
DISCHARGE SUMMARY        Patient Identification:  Name:  Dominique De Dios  Age:  28 y.o.  Sex:  female  :  1990  MRN:  [id number]  Visit Number:  [id number]    Date of Admission: 3/20/2019  Date of Discharge:  3/21/19    PCP: Asiya Kern APRN    Discharging Provider: Miriam Aquino PA-C      Discharge Diagnoses     Syncope      Consults/Procedures     Consults:   Consults     Date and Time Order Name Status Description    3/21/2019 1033 Inpatient Cardiology Consult      3/20/2019 1812 IP General Consult (Use specialty-specific consult if known)            Procedures Performed:         History of Presenting Illness     The patient is a 28-year-old female with past medical history significant for stress, enlarged thyroid, and anxiety attacks who presented to the ED for 2 episodes of syncope.  Patient states she was at work typing when she felt confused followed by a syncopal episode.  Another similar episode happened later where she developed nausea then the syncopal episode followed by confusion.  Patient states that her hands threw up while in the state.  Patient denies any history of prior seizures.     Vital signs stable.  Cardiac enzymes negative x2 sets.  EKG revealed T wave abnormalities with repeat EKG on 3/21/2019 showing nonspecific change in ST segment in inferior leads with T wave inversion no longer evident.  Drug screen negative.  UA negative for UTI.  CT of head reveals no evidence of acute ischemic events.  Carotid ultrasound pending.    Hospital Course     Patient was admitted to the Observation unit, with workup initiated. Carotid US unremarkable. 2D echo unremarkable. Last EKG shows normal sinus rhythm. Patient discussed with neurology who recommends outpatient EEG in one week.  Patient should refrain from driving a motor vehicle or swimming. Patient will follow-up as outpatient with neurology after discharge.Would recommend to follow up with cardiology and PCP within one week  as well. Stable for discharge.     Discharge Vitals/Physical Examination     Vital Signs:  Temp:  [98.2 °F (36.8 °C)-98.8 °F (37.1 °C)] 98.8 °F (37.1 °C)  Heart Rate:  [] 56  Resp:  [18] 18  BP: ()/() 134/70  Mean Arterial Pressure (Non-Invasive) for the past 24 hrs (Last 3 readings):   Noninvasive MAP (mmHg)   03/21/19 1631 89   03/21/19 1246 109   03/21/19 0759 94     SpO2 Percentage    03/21/19 0759 03/21/19 1246 03/21/19 1631   SpO2: 98% 99% 98%     SpO2:  [97 %-100 %] 98 %  on   ;   Device (Oxygen Therapy): room air    Body mass index is 16.64 kg/m².  Wt Readings from Last 3 Encounters:   03/20/19 45.4 kg (100 lb)   12/16/16 64 kg (141 lb)   12/09/16 62.1 kg (137 lb)         Physical Exam:    Constitutional:  Well-developed and well-nourished.  No respiratory distress.      HENT:  Head: Normocephalic and atraumatic.  Mouth:  Moist mucous membranes.    Eyes:  Conjunctivae and EOM are normal.  No scleral icterus.  Neck:  Neck supple.  No JVD present.    Cardiovascular:  Normal rate, regular rhythm and normal heart sounds with no murmur. No edema.  Pulmonary/Chest:  No respiratory distress, no wheezes, no crackles, with normal breath sounds and good air movement.  Abdominal:  Soft.  Bowel sounds are normal.  No distension and no tenderness.   Musculoskeletal:  No edema, no tenderness, and no deformity.  No swelling or redness of joints.  Neurological:  Alert and oriented to person, place, and time.  No facial droop.  No slurred speech.   Skin:  Skin is warm and dry.  No rash noted.  No pallor.   Psychiatric:  Normal mood and affect.  Behavior is normal.      Pertinent Laboratory/Radiology Results     Pertinent Laboratory Results:    Results from last 7 days   Lab Units 03/20/19  1635 03/20/19  1451   TROPONIN T ng/mL <0.010 <0.010       Results from last 7 days   Lab Units 03/21/19  0242   CHOLESTEROL mg/dL 136   TRIGLYCERIDES mg/dL 62   HDL CHOL mg/dL 49   LDL CHOL mg/dL 75     Results from last  7 days   Lab Units 03/20/19  1445   PH, ARTERIAL pH units 7.477*   PO2 ART mm Hg 80.7   PCO2, ARTERIAL mm Hg 29.1*   HCO3 ART mmol/L 21.0*     Results from last 7 days   Lab Units 03/21/19  0242 03/20/19  1451 03/20/19  1450 03/20/19  1424   CRP mg/dL  --  0.45  --   --    WBC 10*3/mm3 6.42  --   --  10.43   HEMOGLOBIN g/dL 11.4*  --   --  14.4   HEMATOCRIT % 35.7  --   --  42.8   MCV fL 93.7  --   --  90.7   MCHC g/dL 31.9  --   --  33.6   PLATELETS 10*3/mm3 244  --   --  343   INR   --   --  0.96  --      Results from last 7 days   Lab Units 03/21/19  0242 03/20/19  1451 03/20/19  1424   SODIUM mmol/L 137 138 137   POTASSIUM mmol/L 4.0 3.5 3.5   CHLORIDE mmol/L 109* 103 100   CO2 mmol/L 18.7* 20.8* 20.4*   BUN mg/dL 10 9 9   CREATININE mg/dL 0.76 0.81 0.84   EGFR IF NONAFRICN AM mL/min/1.73 91 84 81   CALCIUM mg/dL 8.0* 9.5 9.6   GLUCOSE mg/dL 95 102* 116*   ALBUMIN g/dL 3.21* 4.54 4.82   BILIRUBIN mg/dL 0.2 0.4 0.4   ALK PHOS U/L 32* 42 46   AST (SGOT) U/L 12 16 18   ALT (SGPT) U/L 6 9 9   Estimated Creatinine Clearance: 79 mL/min (by C-G formula based on SCr of 0.76 mg/dL).  No results found for: AMMONIA    No results found for: HGBA1C, POCGLU  No results found for: HGBA1C  Lab Results   Component Value Date    TSH 1.610 03/20/2019                      Pain Management Panel     Pain Management Panel Latest Ref Rng & Units 3/20/2019    AMPHETAMINES SCREEN, URINE Negative Negative    BARBITURATES SCREEN Negative Negative    BENZODIAZEPINE SCREEN, URINE Negative Negative    BUPRENORPHINE Negative Negative    COCAINE SCREEN, URINE Negative Negative    METHADONE SCREEN, URINE Negative Negative          Pertinent Radiology Results:  Imaging Results (all)     Procedure Component Value Units Date/Time    US Carotid Bilateral [[id number]] Collected:  03/21/19 1114     Updated:  03/21/19 1132    Narrative:       US CAROTID BILATERAL-      Technique: Multiple real-time color Doppler images were acquired of  bilateral  carotid arteries.     Stenosis measurements if obtained, were performed by the NASCET or  similar method.        CLINICAL INDICATION: Syncope; R55-Syncope and collapse.     COMPARISON:    None.     FINDINGS:        RIGHT:  No significant intimal thickening or plaque is noted. No occlusion.     RIGHT ICA PSV: 133.5 cm/s.  RIGHT ICA EDV: 49.9 cm/s.   Right ICA/CCA Ratio: 0.9.  Anterograde flow is demonstrated in RIGHT vertebral artery.     LEFT:  No significant intimal thickening or plaque is noted. No occlusion.     LEFT ICA PSV: 125.2 cm/s.  LEFT EDV: 48.6 cm/s.   Left ICA/CCA Ratio: 0.8.  Anterograde flow is demonstrated in LEFT vertebral artery.          Impression:          No evidence of hemodynamically significant plaques or stenosis within  the carotid system at this time.     This report was finalized on 3/21/2019 11:30 AM by Dr. Mike Wright MD.       CT Head Without Contrast Stroke Protocol [[id number]] Collected:  03/20/19 1412     Updated:  03/20/19 1416    Narrative:       CT HEAD WO CONTRAST STROKE PROTOCOL-     CLINICAL INDICATION: Confusion/delirium, altered LOC, unexplained        COMPARISON: None available      TECHNIQUE: Axial images of the brain were obtained with out intravenous  contrast.  Reformatted images were created in the sagittal and coronal  planes.     DOSE:     Radiation dose reduction techniques were utilized per ALARA protocol.  Automated exposure control was initiated through either or [employer] or  Dose[employer] software packages by  protocol.           FINDINGS:   Today's study shows no mass, hemorrhage, or midline shift.   The ventricles, cisterns, and sulci are unremarkable. There is no  hydrocephalus.   There is no evidence of acute ischemia.  I do not see epidural or subdural hematoma.  The gray-white differentiation is appropriate.   The bone window setting images show no destructive calvarial lesion or  acute calvarial fracture.   The posterior fossa is  unremarkable.          Impression:       No evidence of an acute ischemic event     I have discussed the results with Dr. Trinh in the emergency department  at 2:12 PM     This report was finalized on 3/20/2019 2:14 PM by Dr. Mike Wright MD.             Test Results Pending at Discharge:      Discharge Disposition/Discharge Medications/Discharge Appointments     Discharge Disposition:   Home or Self Care    Condition at Discharge:  Stable, much improved with no issues today     Code Status While Inpatient:  Code Status and Medical Interventions:   Ordered at: 03/20/19 1852     Code Status:    CPR     Medical Interventions (Level of Support Prior to Arrest):    Full       Discharge Medications:     Discharge Medications      Continue These Medications      Instructions Start Date   raNITIdine 150 MG tablet  Commonly known as:  ZANTAC   150 mg, Oral, 2 Times Daily PRN             Discharge Diet:  Diet Instructions     Diet: Regular      Discharge Diet:  Regular          Discharge Activity:  Activity Instructions     Activity as Tolerated            Discharge Appointments:      Follow-up Information     Asiya Kern, KANDY. Schedule an appointment as soon as possible for a visit in 1 week(s).    Specialty:  Family Medicine  Contact information:  [address] [facility] [id number] [phone]             Acosta Hernandez MD. Schedule an appointment as soon as possible for a visit in 1 week(s).    Specialty:  Neurology  Contact information:  [address] [facility] [address]  [phone]                           Miriam Aquino PA-C  03/21/19  4:57 PM          Please note that this discharge summary required more than 30 minutes to complete.

## 2019-03-21 NOTE — CONSULTS
Consults  Date of Admit: 3/20/2019  Date of Consult: 03/21/19  No ref. provider found  Dominique De Dios  1990    Consulting Physician: Sarthak White MD    Cardiology consultation    Reason for consultation:  Syncope    Assessment:  1. Syncope  2. Tachycardia      Recommendations:  1. Echo results were discussed with the patient and her family.  All questions were answered to their satisfaction.  2. Discussed vasovagal syncope v cardiac and neuro causes of syncope.  Self care advice given:  Lay down as soon as possible when feeling pre syncope symptoms, hydrate with electrolyte replacing drinks.    3. Start Atenolol 25 mg for tachycardia  4. Will arrange for outpatient Event Monitor.  Pt will take tomorrow morning for placement.  5. Patient plans to follow-up with neuro.  6. We will see the patient in clinic in approximately 2 weeks.      History of Present Illness    Subjective     Chief Complaint   Patient presents with   • Syncope   • Altered Mental Status       Dominique De Dios is a 28 y.o. female with past medical history significant for migraines and anxiety attacks.  She presented to the ED on 3/20/2019 after having had 2 witnessed episodes of syncope earlier in the day.  She was admitted for further evaluation and management.  Cardiology was consulted for tachycardia and syncope.    Ms. Jackman was resting comfortably in her bed when we entered the room.  She reported that yesterday she was at work sitting at her desk, typing on her computer, when she suddenly felt her heart racing.  She apparently also became confused.  She did stand up and walk across the room to tell her colleagues that she was feeling bad, however she fainted before she reached them.  She also had an additional syncopal spell with similar symptoms preceding the event.    Ms De Dios reports that she had similar syncopal episodes with migraines during her teen years.    Last Echo: 3/21/2019    Interpretation Summary     · Estimated EF =  65%.  · Left ventricular systolic function is normal.  · Normal valvular structures  · No prev echo         Past Medical History:   Diagnosis Date   • Acid reflux    • Anxiety    • Migraine    • Migraines    • Urinary tract infection      Past Surgical History:   Procedure Laterality Date   •  SECTION Bilateral 2016    Procedure:  SECTION PRIMARY;  Surgeon: Valdez Bach DO;  Location:  [facility];  Service:      Family History   Problem Relation Age of Onset   • Stroke Father      Social History     Tobacco Use   • Smoking status: Never Smoker   • Smokeless tobacco: Never Used   Substance Use Topics   • Alcohol use: No   • Drug use: No     No medications prior to admission.     Allergies:  Ceclor [cefaclor]    Review of Systems   Constitutional: Negative for fatigue.   Respiratory: Negative for shortness of breath.    Cardiovascular: Negative for chest pain, palpitations and leg swelling.   Gastrointestinal: Negative for blood in stool.   Neurological: Negative for dizziness, syncope, weakness and light-headedness.   Hematological: Does not bruise/bleed easily.         Objective      Vital Signs  Temp:  [98.2 °F (36.8 °C)-98.8 °F (37.1 °C)] 98.8 °F (37.1 °C)  Heart Rate:  [56-93] 56  Resp:  [18] 18  BP: ()/(56-79) 134/70  Body mass index is 16.64 kg/m².    Intake/Output Summary (Last 24 hours) at 3/21/2019 1813  Last data filed at 3/21/2019 1246  Gross per 24 hour   Intake 1460 ml   Output --   Net 1460 ml       Physical Exam   Constitutional: She is oriented to person, place, and time. She appears well-developed and well-nourished.   HENT:   Head: Normocephalic and atraumatic.   Eyes: Pupils are equal, round, and reactive to light.   Neck: No JVD present.   Cardiovascular: Normal rate, regular rhythm and intact distal pulses. Exam reveals no gallop and no friction rub.   No murmur heard.  Pulmonary/Chest: Effort normal and breath sounds normal. No respiratory  distress. She has no wheezes. She has no rales.   Neurological: She is alert and oriented to person, place, and time.   Skin: Skin is warm and dry.   Psychiatric: She has a normal mood and affect.   Vitals reviewed.      Telemetry:  70 -100    Results Review:   I reviewed the patient's new clinical results.  Results from last 7 days   Lab Units 03/20/19  1635 03/20/19  1451   TROPONIN T ng/mL <0.010 <0.010     Results from last 7 days   Lab Units 03/21/19  0242 03/20/19  1424   WBC 10*3/mm3 6.42 10.43   HEMOGLOBIN g/dL 11.4* 14.4   PLATELETS 10*3/mm3 244 343     Results from last 7 days   Lab Units 03/21/19  0242 03/20/19  1451 03/20/19  1424   SODIUM mmol/L 137 138 137   POTASSIUM mmol/L 4.0 3.5 3.5   CHLORIDE mmol/L 109* 103 100   CO2 mmol/L 18.7* 20.8* 20.4*   BUN mg/dL 10 9 9   CREATININE mg/dL 0.76 0.81 0.84   CALCIUM mg/dL 8.0* 9.5 9.6   GLUCOSE mg/dL 95 102* 116*   ALT (SGPT) U/L 6 9 9   AST (SGOT) U/L 12 16 18     Lab Results   Component Value Date    INR 0.96 03/20/2019     No results found for: MG  Lab Results   Component Value Date    TSH 1.610 03/20/2019    TRIG 62 03/21/2019    HDL 49 03/21/2019    LDL 75 03/21/2019      No results found for: BNP     EKG:         Imaging Results (last 72 hours)     Procedure Component Value Units Date/Time    US Carotid Bilateral [[id number]] Collected:  03/21/19 1114     Updated:  03/21/19 1132    Narrative:       US CAROTID BILATERAL-      Technique: Multiple real-time color Doppler images were acquired of  bilateral carotid arteries.     Stenosis measurements if obtained, were performed by the NASCET or  similar method.        CLINICAL INDICATION: Syncope; R55-Syncope and collapse.     COMPARISON:    None.     FINDINGS:        RIGHT:  No significant intimal thickening or plaque is noted. No occlusion.     RIGHT ICA PSV: 133.5 cm/s.  RIGHT ICA EDV: 49.9 cm/s.   Right ICA/CCA Ratio: 0.9.  Anterograde flow is demonstrated in RIGHT vertebral artery.     LEFT:  No  significant intimal thickening or plaque is noted. No occlusion.     LEFT ICA PSV: 125.2 cm/s.  LEFT EDV: 48.6 cm/s.   Left ICA/CCA Ratio: 0.8.  Anterograde flow is demonstrated in LEFT vertebral artery.          Impression:          No evidence of hemodynamically significant plaques or stenosis within  the carotid system at this time.     This report was finalized on 3/21/2019 11:30 AM by Dr. Mike Wright MD.       CT Head Without Contrast Stroke Protocol [[id number]] Collected:  03/20/19 1412     Updated:  03/20/19 1416    Narrative:       CT HEAD WO CONTRAST STROKE PROTOCOL-     CLINICAL INDICATION: Confusion/delirium, altered LOC, unexplained        COMPARISON: None available      TECHNIQUE: Axial images of the brain were obtained with out intravenous  contrast.  Reformatted images were created in the sagittal and coronal  planes.     DOSE:     Radiation dose reduction techniques were utilized per ALARA protocol.  Automated exposure control was initiated through either or [employer] or  [employer] software packages by  protocol.           FINDINGS:   Today's study shows no mass, hemorrhage, or midline shift.   The ventricles, cisterns, and sulci are unremarkable. There is no  hydrocephalus.   There is no evidence of acute ischemia.  I do not see epidural or subdural hematoma.  The gray-white differentiation is appropriate.   The bone window setting images show no destructive calvarial lesion or  acute calvarial fracture.   The posterior fossa is unremarkable.          Impression:       No evidence of an acute ischemic event     I have discussed the results with Dr. Trinh in the emergency department  at 2:12 PM     This report was finalized on 3/20/2019 2:14 PM by Dr. Mike Wright MD.                Thank you very much for asking us to be involved in this patient's care.  We will follow along with you.    KANDY Saldivar scribe for Dr. Sarthak White  03/21/19  6:13 PM

## 2019-03-21 NOTE — PLAN OF CARE
Problem: Syncope (Adult)  Goal: Identify Related Risk Factors and Signs and Symptoms  Outcome: Ongoing (interventions implemented as appropriate)      Problem: Patient Care Overview  Goal: Plan of Care Review  Outcome: Ongoing (interventions implemented as appropriate)   03/20/19 1930 03/21/19 0715   Coping/Psychosocial   Plan of Care Reviewed With --  patient   Coping/Psychosocial   Patient Agreement with Plan of Care agrees --    Plan of Care Review   Progress no change --        Problem: Patient Care Overview  Goal: Plan of Care Review  Outcome: Ongoing (interventions implemented as appropriate)   03/20/19 1930 03/21/19 0715   Coping/Psychosocial   Plan of Care Reviewed With --  patient   Plan of Care Review   Progress no change --

## 2019-03-22 ENCOUNTER — NURSE TRIAGE (OUTPATIENT)
Dept: CALL CENTER | Facility: HOSPITAL | Age: 29
End: 2019-03-22

## 2019-03-22 PROCEDURE — 93270 REMOTE 30 DAY ECG REV/REPORT: CPT | Performed by: NURSE PRACTITIONER

## 2019-03-22 NOTE — TELEPHONE ENCOUNTER
"She was discharged from [facility] and was wondering if she was tested for pregnancy, I told her she would have to call PCP or Medical records to get the test result., gave her th number for medical records for [facility]    Reason for Disposition  • Health Information question, no triage required and triager able to answer question    Additional Information  • Negative: [1] Caller is not with the adult (patient) AND [2] reporting urgent symptoms  • Negative: Lab result questions  • Negative: Medication questions  • Negative: Caller cannot be reached by phone  • Negative: Caller has already spoken to PCP or another triager  • Negative: RN needs further essential information from caller in order to complete triage  • Negative: Requesting regular office appointment  • Negative: [1] Caller requesting NON-URGENT health information AND [2] PCP's office is the best resource  • Negative: General information question, no triage required and triager able to answer question  • Negative: Question about upcoming scheduled test, no triage required and triager able to answer question  • Negative: [1] Caller is not with the adult (patient) AND [2] probable NON-URGENT symptoms    Answer Assessment - Initial Assessment Questions  1. REASON FOR CALL or QUESTION: \"What is your reason for calling today?\" or \"How can I best help you?\" or \"What question do you have that I can help answer?\"      She was wanting lab results from pregnancy test.    Protocols used: INFORMATION ONLY CALL-ADULT-      "

## 2019-04-04 PROCEDURE — 93272 ECG/REVIEW INTERPRET ONLY: CPT | Performed by: NURSE PRACTITIONER

## 2019-04-10 ENCOUNTER — OFFICE VISIT (OUTPATIENT)
Dept: CARDIOLOGY | Facility: CLINIC | Age: 29
End: 2019-04-10

## 2019-04-10 VITALS
HEART RATE: 76 BPM | DIASTOLIC BLOOD PRESSURE: 68 MMHG | OXYGEN SATURATION: 99 % | SYSTOLIC BLOOD PRESSURE: 103 MMHG | BODY MASS INDEX: 18.16 KG/M2 | HEIGHT: 66 IN | WEIGHT: 113 LBS

## 2019-04-10 DIAGNOSIS — R55 SYNCOPE AND COLLAPSE: ICD-10-CM

## 2019-04-10 DIAGNOSIS — R00.2 PALPITATIONS: Primary | ICD-10-CM

## 2019-04-10 DIAGNOSIS — R06.02 SHORTNESS OF BREATH: ICD-10-CM

## 2019-04-10 PROCEDURE — 99213 OFFICE O/P EST LOW 20 MIN: CPT | Performed by: NURSE PRACTITIONER

## 2019-04-10 NOTE — PROGRESS NOTES
"Subjective     Chief Complaint: Results (Event Monitor); Rapid Heart Rate; and Loss of Consciousness    History of Present Illness   Dominique De Dios is a 28 y.o. female who presents for follow-up.  She was recently hospitalized with an episode of syncope and tachycardia.  An outpatient event monitor was placed and she is here today for those results.    The event monitor, which was worn for a total of 11 and half days, showed no critical events.  There were no serious events.  The average heart rate was 85.8 bpm and there was one episode to tachycardia noted by the patient, rate was 111.      She denies any new syncopal or near syncopal episodes.  She reports that \"heart racing and pounding\"  has decreased since starting atenolol.       Current Outpatient Medications:   •  atenolol (TENORMIN) 25 MG tablet, Take 1 tablet by mouth Daily., Disp: 30 tablet, Rfl: 5     Current outpatient and discharge medications have been reconciled for the patient.  Reviewed by: KANDY Waite    On this date:  The following portions of the patient's history were reviewed and updated as appropriate: allergies, current medications, past family history, past medical history, past social history, past surgical history and problem list.    Review of Systems   Constitution: Positive for weakness and malaise/fatigue.   HENT: Negative.    Eyes: Negative.    Cardiovascular: Positive for palpitations.   Respiratory: Positive for shortness of breath.    Endocrine: Negative.    Hematologic/Lymphatic: Bruises/bleeds easily.   Skin: Negative.    Musculoskeletal: Negative.    Gastrointestinal: Negative.    Genitourinary: Negative.    Neurological: Positive for dizziness and light-headedness.   Psychiatric/Behavioral: Negative.    Allergic/Immunologic: Negative.          Objective     /68 (BP Location: Left arm)   Pulse 76   Ht 166.4 cm (65.5\")   Wt 51.3 kg (113 lb)   SpO2 99%   BMI 18.52 kg/m²     Physical Exam   Constitutional: She " is oriented to person, place, and time. She appears well-developed and well-nourished.   HENT:   Head: Normocephalic and atraumatic.   Eyes: Pupils are equal, round, and reactive to light.   Neck: No JVD present.   Cardiovascular: Normal rate, regular rhythm and intact distal pulses. Exam reveals no gallop and no friction rub.   No murmur heard.  Pulmonary/Chest: Effort normal and breath sounds normal. No respiratory distress. She has no wheezes. She has no rales.   Neurological: She is alert and oriented to person, place, and time.   Skin: Skin is warm and dry.   Psychiatric: She has a normal mood and affect.       Procedures      Assessment/Plan       Dominique was seen today for results, rapid heart rate and loss of consciousness.    Diagnoses and all orders for this visit:    Assessment:  1. Syncope, likely vasovagal,, work up has been essentially normal.  2. Sinus tachycardia  3. Palpitations    Plan:  1. Reassured patient that her workup was negative for structural heart disease.    2. Continue atenolol for treatment of sinus tachycardia/palpitations  3. Return to clinic as needed.      No Follow-up on file.    Lori Carpio, APRN

## 2019-04-12 PROBLEM — F41.1 GENERALIZED ANXIETY DISORDER: Status: ACTIVE | Noted: 2019-03-27

## 2019-11-13 RX ORDER — ATENOLOL 25 MG/1
25 TABLET ORAL DAILY
Qty: 30 TABLET | Refills: 5 | Status: SHIPPED | OUTPATIENT
Start: 2019-11-13 | End: 2020-06-03

## 2020-06-03 RX ORDER — ATENOLOL 25 MG/1
25 TABLET ORAL DAILY
Qty: 30 TABLET | Refills: 5 | Status: SHIPPED | OUTPATIENT
Start: 2020-06-03 | End: 2022-11-15

## 2021-01-13 ENCOUNTER — HOSPITAL ENCOUNTER (EMERGENCY)
Facility: HOSPITAL | Age: 31
Discharge: HOME | End: 2021-01-13
Attending: EMERGENCY MEDICINE | Admitting: EMERGENCY MEDICINE

## 2021-01-13 ENCOUNTER — APPOINTMENT (OUTPATIENT)
Dept: ULTRASOUND IMAGING | Facility: HOSPITAL | Age: 31
End: 2021-01-13

## 2021-01-13 VITALS
HEART RATE: 93 BPM | BODY MASS INDEX: 23.32 KG/M2 | HEIGHT: 65 IN | OXYGEN SATURATION: 98 % | RESPIRATION RATE: 18 BRPM | SYSTOLIC BLOOD PRESSURE: 144 MMHG | WEIGHT: 140 LBS | TEMPERATURE: 99.2 F | DIASTOLIC BLOOD PRESSURE: 97 MMHG

## 2021-01-13 DIAGNOSIS — T14.8XXA BRUISING: ICD-10-CM

## 2021-01-13 DIAGNOSIS — M79.604 RIGHT LEG PAIN: Primary | ICD-10-CM

## 2021-01-13 LAB
ALBUMIN SERPL-MCNC: 4.33 G/DL (ref 3.5–5.2)
ALBUMIN/GLOB SERPL: 1.6 G/DL
ALP SERPL-CCNC: 52 U/L (ref 39–117)
ALT SERPL W P-5'-P-CCNC: 16 U/L (ref 1–33)
ANION GAP SERPL CALCULATED.3IONS-SCNC: 8.6 MMOL/L (ref 5–15)
APTT PPP: 25.8 SECONDS (ref 25.6–35.3)
AST SERPL-CCNC: 20 U/L (ref 1–32)
BASOPHILS # BLD AUTO: 0.04 10*3/MM3 (ref 0–0.2)
BASOPHILS NFR BLD AUTO: 0.6 % (ref 0–1.5)
BILIRUB SERPL-MCNC: 0.3 MG/DL (ref 0–1.2)
BUN SERPL-MCNC: 10 MG/DL (ref 6–20)
BUN/CREAT SERPL: 10.6 (ref 7–25)
CALCIUM SPEC-SCNC: 9.3 MG/DL (ref 8.6–10.5)
CHLORIDE SERPL-SCNC: 100 MMOL/L (ref 98–107)
CO2 SERPL-SCNC: 26.4 MMOL/L (ref 22–29)
CREAT SERPL-MCNC: 0.94 MG/DL (ref 0.57–1)
DEPRECATED RDW RBC AUTO: 39.6 FL (ref 37–54)
EOSINOPHIL # BLD AUTO: 0.06 10*3/MM3 (ref 0–0.4)
EOSINOPHIL NFR BLD AUTO: 0.9 % (ref 0.3–6.2)
ERYTHROCYTE [DISTWIDTH] IN BLOOD BY AUTOMATED COUNT: 11.7 % (ref 12.3–15.4)
GFR SERPL CREATININE-BSD FRML MDRD: 70 ML/MIN/1.73
GLOBULIN UR ELPH-MCNC: 2.7 GM/DL
GLUCOSE SERPL-MCNC: 99 MG/DL (ref 65–99)
HCT VFR BLD AUTO: 39.4 % (ref 34–46.6)
HGB BLD-MCNC: 13.3 G/DL (ref 12–15.9)
IMM GRANULOCYTES # BLD AUTO: 0.01 10*3/MM3 (ref 0–0.05)
IMM GRANULOCYTES NFR BLD AUTO: 0.2 % (ref 0–0.5)
INR PPP: 0.91 (ref 0.9–1.1)
LIPASE SERPL-CCNC: 25 U/L (ref 13–60)
LYMPHOCYTES # BLD AUTO: 2.23 10*3/MM3 (ref 0.7–3.1)
LYMPHOCYTES NFR BLD AUTO: 33.8 % (ref 19.6–45.3)
MCH RBC QN AUTO: 31.3 PG (ref 26.6–33)
MCHC RBC AUTO-ENTMCNC: 33.8 G/DL (ref 31.5–35.7)
MCV RBC AUTO: 92.7 FL (ref 79–97)
MONOCYTES # BLD AUTO: 0.35 10*3/MM3 (ref 0.1–0.9)
MONOCYTES NFR BLD AUTO: 5.3 % (ref 5–12)
NEUTROPHILS NFR BLD AUTO: 3.9 10*3/MM3 (ref 1.7–7)
NEUTROPHILS NFR BLD AUTO: 59.2 % (ref 42.7–76)
NRBC BLD AUTO-RTO: 0 /100 WBC (ref 0–0.2)
PLATELET # BLD AUTO: 298 10*3/MM3 (ref 140–450)
PMV BLD AUTO: 8.9 FL (ref 6–12)
POTASSIUM SERPL-SCNC: 3.7 MMOL/L (ref 3.5–5.2)
PROT SERPL-MCNC: 7 G/DL (ref 6–8.5)
PROTHROMBIN TIME: 12.1 SECONDS (ref 11.9–14.1)
RBC # BLD AUTO: 4.25 10*6/MM3 (ref 3.77–5.28)
SODIUM SERPL-SCNC: 135 MMOL/L (ref 136–145)
WBC # BLD AUTO: 6.59 10*3/MM3 (ref 3.4–10.8)

## 2021-01-13 PROCEDURE — 99283 EMERGENCY DEPT VISIT LOW MDM: CPT

## 2021-01-13 PROCEDURE — 85610 PROTHROMBIN TIME: CPT | Performed by: PHYSICIAN ASSISTANT

## 2021-01-13 PROCEDURE — 85025 COMPLETE CBC W/AUTO DIFF WBC: CPT | Performed by: PHYSICIAN ASSISTANT

## 2021-01-13 PROCEDURE — 93971 EXTREMITY STUDY: CPT

## 2021-01-13 PROCEDURE — 83690 ASSAY OF LIPASE: CPT | Performed by: PHYSICIAN ASSISTANT

## 2021-01-13 PROCEDURE — 85730 THROMBOPLASTIN TIME PARTIAL: CPT | Performed by: PHYSICIAN ASSISTANT

## 2021-01-13 PROCEDURE — 93971 EXTREMITY STUDY: CPT | Performed by: RADIOLOGY

## 2021-01-13 PROCEDURE — 80053 COMPREHEN METABOLIC PANEL: CPT | Performed by: PHYSICIAN ASSISTANT

## 2021-01-13 NOTE — ED PROVIDER NOTES
"Subjective   30-year-old female with past medical history of GERD, anxiety, \"some clotting disorder\", and migraines presents to the emergency room with right leg pain and swelling.  Patient states yesterday she had pain in the right posterior thigh, then woke up this morning with a bruise.  She denies any known injury.  Denies calf pain.  She does state that the bruised area is tender.  Denies any alleviating factors.  Patient is not a smoker and does not take any type of hormone medications including contraceptives      History provided by:  Patient   used: No        Review of Systems   Constitutional: Negative.  Negative for fever.   HENT: Negative.    Respiratory: Negative.    Cardiovascular: Negative.  Negative for chest pain.   Gastrointestinal: Negative.  Negative for abdominal pain.   Endocrine: Negative.    Genitourinary: Negative.  Negative for dysuria.   Musculoskeletal:        (+) right leg pain/swelling   Skin: Negative.    Neurological: Negative.    Psychiatric/Behavioral: Negative.    All other systems reviewed and are negative.      Past Medical History:   Diagnosis Date   • Acid reflux    • Anxiety    • Migraine    • Migraines    • Urinary tract infection        Allergies   Allergen Reactions   • Ceclor [Cefaclor] Rash     childhood       Past Surgical History:   Procedure Laterality Date   •  SECTION Bilateral 2016    Procedure:  SECTION PRIMARY;  Surgeon: Valdez Bach DO;  Location: [facility] LABOR DELIVERY;  Service:        Family History   Problem Relation Age of Onset   • Stroke Father        Social History     Socioeconomic History   • Marital status:      Spouse name: Not on file   • Number of children: Not on file   • Years of education: Not on file   • Highest education level: Not on file   Tobacco Use   • Smoking status: Never Smoker   • Smokeless tobacco: Never Used   Substance and Sexual Activity   • Alcohol use: No   • Drug use: No "   • Sexual activity: Yes     Partners: Male           Objective   Physical Exam  Vitals signs and nursing note reviewed.   Constitutional:       General: She is not in acute distress.     Appearance: She is well-developed. She is not diaphoretic.   HENT:      Head: Normocephalic and atraumatic.      Right Ear: External ear normal.      Left Ear: External ear normal.      Nose: Nose normal.   Eyes:      Conjunctiva/sclera: Conjunctivae normal.      Pupils: Pupils are equal, round, and reactive to light.   Neck:      Musculoskeletal: Normal range of motion and neck supple.      Vascular: No JVD.      Trachea: No tracheal deviation.   Cardiovascular:      Rate and Rhythm: Normal rate and regular rhythm.      Heart sounds: Normal heart sounds. No murmur.   Pulmonary:      Effort: Pulmonary effort is normal. No respiratory distress.      Breath sounds: Normal breath sounds. No wheezing.   Abdominal:      General: Bowel sounds are normal.      Palpations: Abdomen is soft.      Tenderness: There is no abdominal tenderness.   Musculoskeletal: Normal range of motion.         General: No deformity.      Right lower leg: Normal.      Left lower leg: Normal.   Skin:     General: Skin is warm and dry.      Coloration: Skin is not pale.      Findings: No erythema or rash.          Neurological:      Mental Status: She is alert and oriented to person, place, and time.      Cranial Nerves: No cranial nerve deficit.   Psychiatric:         Behavior: Behavior normal.         Thought Content: Thought content normal.         Procedures           ED Course  ED Course as of Jan 13 1507   Wed Jan 13, 2021   1432 IMPRESSION:   No sonographic findings of DVT in the right lower extremity.    This report was finalized on 1/13/2021 2:25 PM by Dr. Kenroy Shah II, MD.    US Venous Doppler Lower Extremity Right (duplex) [TK]      ED Course User Index  [TK] Onelia Rodriguez, KEY                                           [facility]  Number of  Diagnoses or Management Options  Bruising: new and requires workup  Right leg pain: new and requires workup     Amount and/or Complexity of Data Reviewed  Clinical lab tests: reviewed and ordered  Tests in the radiology section of CPT®: reviewed and ordered    Risk of Complications, Morbidity, and/or Mortality  Presenting problems: moderate  Diagnostic procedures: moderate  Management options: minimal    Patient Progress  Patient progress: stable      Final diagnoses:   Right leg pain   Bruising            Onelia Rodriguez PA-C  01/13/21 150

## 2021-01-16 ENCOUNTER — APPOINTMENT (OUTPATIENT)
Dept: CT IMAGING | Facility: HOSPITAL | Age: 31
End: 2021-01-16

## 2021-01-16 ENCOUNTER — HOSPITAL ENCOUNTER (EMERGENCY)
Facility: HOSPITAL | Age: 31
Discharge: HOME | End: 2021-01-16
Attending: EMERGENCY MEDICINE | Admitting: EMERGENCY MEDICINE

## 2021-01-16 VITALS
OXYGEN SATURATION: 99 % | SYSTOLIC BLOOD PRESSURE: 122 MMHG | HEIGHT: 65 IN | HEART RATE: 99 BPM | RESPIRATION RATE: 19 BRPM | DIASTOLIC BLOOD PRESSURE: 88 MMHG | TEMPERATURE: 97.8 F | WEIGHT: 140 LBS | BODY MASS INDEX: 23.32 KG/M2
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
## 2021-01-16 DIAGNOSIS — S13.4XXA WHIPLASH INJURY TO NECK, INITIAL ENCOUNTER: ICD-10-CM

## 2021-01-16 PROCEDURE — 70450 CT HEAD/BRAIN W/O DYE: CPT

## 2021-01-16 PROCEDURE — 72131 CT LUMBAR SPINE W/O DYE: CPT

## 2021-01-16 PROCEDURE — 72125 CT NECK SPINE W/O DYE: CPT

## 2021-01-16 PROCEDURE — 99283 EMERGENCY DEPT VISIT LOW MDM: CPT

## 2021-01-16 PROCEDURE — 72128 CT CHEST SPINE W/O DYE: CPT

## 2021-01-16 RX ORDER — IBUPROFEN 800 MG/1
800 TABLET ORAL EVERY 8 HOURS PRN
Qty: 40 TABLET | Refills: 0 | Status: SHIPPED | OUTPATIENT
Start: 2021-01-16 | End: 2022-12-20

## 2021-01-16 RX ORDER — ACETAMINOPHEN 500 MG
1000 TABLET ORAL ONCE
Status: COMPLETED | OUTPATIENT
Start: 2021-01-16 | End: 2021-01-16

## 2021-01-16 RX ADMIN — ACETAMINOPHEN 1000 MG: 500 TABLET ORAL at 19:11
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
## 2021-03-18 ENCOUNTER — BULK ORDERING (OUTPATIENT)
Dept: CASE MANAGEMENT | Facility: OTHER | Age: 31
End: 2021-03-18

## 2021-03-18 DIAGNOSIS — Z23 IMMUNIZATION DUE: ICD-10-CM

## 2021-09-22 ENCOUNTER — TRANSCRIBE ORDERS (OUTPATIENT)
Dept: ADMINISTRATIVE | Facility: HOSPITAL | Age: 31
End: 2021-09-22

## 2021-09-22 DIAGNOSIS — E04.9 NONTOXIC GOITER: Primary | ICD-10-CM

## 2021-10-05 ENCOUNTER — HOSPITAL ENCOUNTER (OUTPATIENT)
Dept: MAMMOGRAPHY | Facility: HOSPITAL | Age: 31
Discharge: HOME | End: 2021-10-05

## 2021-10-05 ENCOUNTER — HOSPITAL ENCOUNTER (OUTPATIENT)
Dept: ULTRASOUND IMAGING | Facility: HOSPITAL | Age: 31
Discharge: HOME | End: 2021-10-05

## 2021-10-05 DIAGNOSIS — N64.4 MASTODYNIA: ICD-10-CM

## 2021-10-05 PROCEDURE — 76642 ULTRASOUND BREAST LIMITED: CPT

## 2021-10-05 PROCEDURE — 77066 DX MAMMO INCL CAD BI: CPT

## 2021-10-05 PROCEDURE — G0279 TOMOSYNTHESIS, MAMMO: HCPCS

## 2021-10-05 PROCEDURE — 77066 DX MAMMO INCL CAD BI: CPT | Performed by: RADIOLOGY

## 2021-10-05 PROCEDURE — 77062 BREAST TOMOSYNTHESIS BI: CPT | Performed by: RADIOLOGY

## 2021-10-05 PROCEDURE — 76642 ULTRASOUND BREAST LIMITED: CPT | Performed by: RADIOLOGY

## 2022-06-08 ENCOUNTER — HOSPITAL ENCOUNTER (EMERGENCY)
Facility: HOSPITAL | Age: 32
Discharge: HOME | End: 2022-06-09
Attending: EMERGENCY MEDICINE | Admitting: EMERGENCY MEDICINE

## 2022-06-08 ENCOUNTER — APPOINTMENT (OUTPATIENT)
Dept: GENERAL RADIOLOGY | Facility: HOSPITAL | Age: 32
End: 2022-06-08

## 2022-06-08 ENCOUNTER — APPOINTMENT (OUTPATIENT)
Dept: CT IMAGING | Facility: HOSPITAL | Age: 32
End: 2022-06-08

## 2022-06-08 DIAGNOSIS — R07.81 PLEURITIC CHEST PAIN: Primary | ICD-10-CM

## 2022-06-08 LAB
ALBUMIN SERPL-MCNC: 4.32 G/DL (ref 3.5–5.2)
ALBUMIN/GLOB SERPL: 1.4 G/DL
ALP SERPL-CCNC: 55 U/L (ref 39–117)
ALT SERPL W P-5'-P-CCNC: 32 U/L (ref 1–33)
ANION GAP SERPL CALCULATED.3IONS-SCNC: 14 MMOL/L (ref 5–15)
AST SERPL-CCNC: 26 U/L (ref 1–32)
BASOPHILS # BLD AUTO: 0.05 10*3/MM3 (ref 0–0.2)
BASOPHILS NFR BLD AUTO: 0.5 % (ref 0–1.5)
BILIRUB SERPL-MCNC: <0.2 MG/DL (ref 0–1.2)
BUN SERPL-MCNC: 8 MG/DL (ref 6–20)
BUN/CREAT SERPL: 9 (ref 7–25)
CALCIUM SPEC-SCNC: 9.3 MG/DL (ref 8.6–10.5)
CHLORIDE SERPL-SCNC: 103 MMOL/L (ref 98–107)
CO2 SERPL-SCNC: 22 MMOL/L (ref 22–29)
CREAT SERPL-MCNC: 0.89 MG/DL (ref 0.57–1)
D DIMER PPP FEU-MCNC: 0.75 MCGFEU/ML (ref 0–0.5)
DEPRECATED RDW RBC AUTO: 39.9 FL (ref 37–54)
EGFRCR SERPLBLD CKD-EPI 2021: 88.5 ML/MIN/1.73
EOSINOPHIL # BLD AUTO: 0.14 10*3/MM3 (ref 0–0.4)
EOSINOPHIL NFR BLD AUTO: 1.4 % (ref 0.3–6.2)
ERYTHROCYTE [DISTWIDTH] IN BLOOD BY AUTOMATED COUNT: 11.7 % (ref 12.3–15.4)
FLUAV SUBTYP SPEC NAA+PROBE: NOT DETECTED
FLUBV RNA ISLT QL NAA+PROBE: NOT DETECTED
GLOBULIN UR ELPH-MCNC: 3 GM/DL
GLUCOSE SERPL-MCNC: 85 MG/DL (ref 65–99)
HCG SERPL QL: NEGATIVE
HCT VFR BLD AUTO: 42.8 % (ref 34–46.6)
HGB BLD-MCNC: 14.5 G/DL (ref 12–15.9)
HOLD SPECIMEN: NORMAL
IMM GRANULOCYTES # BLD AUTO: 0.04 10*3/MM3 (ref 0–0.05)
IMM GRANULOCYTES NFR BLD AUTO: 0.4 % (ref 0–0.5)
LYMPHOCYTES # BLD AUTO: 3.41 10*3/MM3 (ref 0.7–3.1)
LYMPHOCYTES NFR BLD AUTO: 33.8 % (ref 19.6–45.3)
MCH RBC QN AUTO: 31.5 PG (ref 26.6–33)
MCHC RBC AUTO-ENTMCNC: 33.9 G/DL (ref 31.5–35.7)
MCV RBC AUTO: 93 FL (ref 79–97)
MONOCYTES # BLD AUTO: 0.4 10*3/MM3 (ref 0.1–0.9)
MONOCYTES NFR BLD AUTO: 4 % (ref 5–12)
NEUTROPHILS NFR BLD AUTO: 59.9 % (ref 42.7–76)
NEUTROPHILS NFR BLD AUTO: 6.06 10*3/MM3 (ref 1.7–7)
NRBC BLD AUTO-RTO: 0 /100 WBC (ref 0–0.2)
PLATELET # BLD AUTO: 351 10*3/MM3 (ref 140–450)
PMV BLD AUTO: 8.6 FL (ref 6–12)
POTASSIUM SERPL-SCNC: 4.2 MMOL/L (ref 3.5–5.2)
PROT SERPL-MCNC: 7.3 G/DL (ref 6–8.5)
QT INTERVAL: 334 MS
QTC INTERVAL: 426 MS
RBC # BLD AUTO: 4.6 10*6/MM3 (ref 3.77–5.28)
SARS-COV-2 RNA PNL SPEC NAA+PROBE: NOT DETECTED
SODIUM SERPL-SCNC: 139 MMOL/L (ref 136–145)
TROPONIN T SERPL-MCNC: <0.01 NG/ML (ref 0–0.03)
WBC NRBC COR # BLD: 10.1 10*3/MM3 (ref 3.4–10.8)
WHOLE BLOOD HOLD COAG: NORMAL
WHOLE BLOOD HOLD SPECIMEN: NORMAL

## 2022-06-08 PROCEDURE — 96360 HYDRATION IV INFUSION INIT: CPT

## 2022-06-08 PROCEDURE — 93005 ELECTROCARDIOGRAM TRACING: CPT | Performed by: EMERGENCY MEDICINE

## 2022-06-08 PROCEDURE — 80053 COMPREHEN METABOLIC PANEL: CPT | Performed by: EMERGENCY MEDICINE

## 2022-06-08 PROCEDURE — 71045 X-RAY EXAM CHEST 1 VIEW: CPT

## 2022-06-08 PROCEDURE — 87636 SARSCOV2 & INF A&B AMP PRB: CPT | Performed by: EMERGENCY MEDICINE

## 2022-06-08 PROCEDURE — 85379 FIBRIN DEGRADATION QUANT: CPT | Performed by: EMERGENCY MEDICINE

## 2022-06-08 PROCEDURE — 85025 COMPLETE CBC W/AUTO DIFF WBC: CPT | Performed by: EMERGENCY MEDICINE

## 2022-06-08 PROCEDURE — 0 IOPAMIDOL PER 1 ML: Performed by: EMERGENCY MEDICINE

## 2022-06-08 PROCEDURE — 71275 CT ANGIOGRAPHY CHEST: CPT

## 2022-06-08 PROCEDURE — 84484 ASSAY OF TROPONIN QUANT: CPT | Performed by: EMERGENCY MEDICINE

## 2022-06-08 PROCEDURE — 84703 CHORIONIC GONADOTROPIN ASSAY: CPT | Performed by: EMERGENCY MEDICINE

## 2022-06-08 PROCEDURE — 36415 COLL VENOUS BLD VENIPUNCTURE: CPT

## 2022-06-08 PROCEDURE — 99284 EMERGENCY DEPT VISIT MOD MDM: CPT

## 2022-06-08 RX ORDER — SODIUM CHLORIDE 0.9 % (FLUSH) 0.9 %
10 SYRINGE (ML) INJECTION AS NEEDED
Status: DISCONTINUED | OUTPATIENT
Start: 2022-06-08 | End: 2022-06-09 | Stop reason: HOSPADM

## 2022-06-08 RX ADMIN — IOPAMIDOL 62 ML: 755 INJECTION, SOLUTION INTRAVENOUS at 23:49

## 2022-06-08 RX ADMIN — SODIUM CHLORIDE 1000 ML: 9 INJECTION, SOLUTION INTRAVENOUS at 23:24

## 2022-06-09 VITALS
BODY MASS INDEX: 22.49 KG/M2 | DIASTOLIC BLOOD PRESSURE: 70 MMHG | RESPIRATION RATE: 17 BRPM | OXYGEN SATURATION: 98 % | HEIGHT: 65 IN | WEIGHT: 135 LBS | TEMPERATURE: 98.1 F | SYSTOLIC BLOOD PRESSURE: 114 MMHG | HEART RATE: 81 BPM

## 2022-06-09 LAB — TROPONIN T SERPL-MCNC: <0.01 NG/ML (ref 0–0.03)

## 2022-06-09 RX ORDER — ACETAMINOPHEN 500 MG
1000 TABLET ORAL EVERY 6 HOURS PRN
Qty: 20 TABLET | Refills: 0 | Status: SHIPPED | OUTPATIENT
Start: 2022-06-09 | End: 2022-06-19

## 2022-06-09 RX ORDER — LIDOCAINE 50 MG/G
1 PATCH TOPICAL ONCE
Status: DISCONTINUED | OUTPATIENT
Start: 2022-06-09 | End: 2022-06-09 | Stop reason: HOSPADM

## 2022-06-09 RX ORDER — CYCLOBENZAPRINE HCL 10 MG
10 TABLET ORAL 3 TIMES DAILY PRN
Qty: 21 TABLET | Refills: 0 | Status: SHIPPED | OUTPATIENT
Start: 2022-06-09 | End: 2022-06-16

## 2022-06-09 RX ORDER — IBUPROFEN 800 MG/1
800 TABLET ORAL
Qty: 21 TABLET | Refills: 0 | Status: SHIPPED | OUTPATIENT
Start: 2022-06-09 | End: 2022-06-16

## 2022-06-09 RX ORDER — ACETAMINOPHEN 500 MG
1000 TABLET ORAL ONCE
Status: COMPLETED | OUTPATIENT
Start: 2022-06-09 | End: 2022-06-09

## 2022-06-09 RX ORDER — IBUPROFEN 800 MG/1
800 TABLET ORAL EVERY 6 HOURS PRN
Status: DISCONTINUED | OUTPATIENT
Start: 2022-06-09 | End: 2022-06-09 | Stop reason: HOSPADM

## 2022-06-09 RX ORDER — LIDOCAINE 50 MG/G
1 PATCH TOPICAL EVERY 24 HOURS
Qty: 7 PATCH | Refills: 0 | Status: SHIPPED | OUTPATIENT
Start: 2022-06-09 | End: 2022-06-16

## 2022-06-09 RX ORDER — CYCLOBENZAPRINE HCL 10 MG
10 TABLET ORAL EVERY 6 HOURS PRN
Status: DISCONTINUED | OUTPATIENT
Start: 2022-06-09 | End: 2022-06-09 | Stop reason: HOSPADM

## 2022-06-09 RX ADMIN — ACETAMINOPHEN 1000 MG: 500 TABLET ORAL at 01:14

## 2022-06-09 RX ADMIN — CYCLOBENZAPRINE HYDROCHLORIDE 10 MG: 10 TABLET, FILM COATED ORAL at 01:18

## 2022-06-09 RX ADMIN — IBUPROFEN 800 MG: 800 TABLET, FILM COATED ORAL at 01:18

## 2022-06-09 RX ADMIN — LIDOCAINE 1 PATCH: 50 PATCH TOPICAL at 01:15

## 2022-06-09 NOTE — ED NOTES
Pt reassessed at this time, pt has no new complaints. Apologized to pt for wait times. Pt has NADN at this time. Will continue to monitor pt.

## 2022-06-09 NOTE — ED NOTES
MEDICAL SCREENING:    Reason for Visit: CP x3D worse with deep breathing    Patient initially seen in triage.  The patient was advised further evaluation and diagnostic testing will be needed, some of the treatment and testing will be initiated in the lobby in order to begin the process.  The patient will be returned to the waiting area for the time being and possibly be re-assessed by a subsequent ED provider.  The patient will be brought back to the treatment area in as timely manner as possible.         Sandro Good MD  06/08/22 7249

## 2022-06-14 ENCOUNTER — TRANSCRIBE ORDERS (OUTPATIENT)
Dept: ADMINISTRATIVE | Facility: HOSPITAL | Age: 32
End: 2022-06-14

## 2022-06-14 DIAGNOSIS — R10.9 STOMACH ACHE: Primary | ICD-10-CM

## 2022-06-15 ENCOUNTER — HOSPITAL ENCOUNTER (OUTPATIENT)
Dept: ULTRASOUND IMAGING | Facility: HOSPITAL | Age: 32
Discharge: HOME | End: 2022-06-15
Admitting: NURSE PRACTITIONER

## 2022-06-15 DIAGNOSIS — R10.9 STOMACH ACHE: ICD-10-CM

## 2022-06-15 PROCEDURE — 76700 US EXAM ABDOM COMPLETE: CPT | Performed by: RADIOLOGY

## 2022-06-15 PROCEDURE — 76700 US EXAM ABDOM COMPLETE: CPT

## 2022-10-26 ENCOUNTER — TRANSCRIBE ORDERS (OUTPATIENT)
Dept: ADMINISTRATIVE | Facility: HOSPITAL | Age: 32
End: 2022-10-26

## 2022-10-26 DIAGNOSIS — R10.9 ABDOMINAL PAIN, UNSPECIFIED ABDOMINAL LOCATION: Primary | ICD-10-CM

## 2022-11-01 ENCOUNTER — APPOINTMENT (OUTPATIENT)
Dept: ULTRASOUND IMAGING | Facility: HOSPITAL | Age: 32
End: 2022-11-01

## 2022-11-01 ENCOUNTER — HOSPITAL ENCOUNTER (OUTPATIENT)
Dept: ULTRASOUND IMAGING | Facility: HOSPITAL | Age: 32
Discharge: HOME | End: 2022-11-01

## 2022-11-01 DIAGNOSIS — R10.9 ABDOMINAL PAIN, UNSPECIFIED ABDOMINAL LOCATION: ICD-10-CM

## 2022-11-01 PROCEDURE — 76700 US EXAM ABDOM COMPLETE: CPT | Performed by: RADIOLOGY

## 2022-11-01 PROCEDURE — 76700 US EXAM ABDOM COMPLETE: CPT

## 2022-11-15 ENCOUNTER — OFFICE VISIT (OUTPATIENT)
Dept: GASTROENTEROLOGY | Facility: CLINIC | Age: 32
End: 2022-11-15

## 2022-11-15 VITALS
HEIGHT: 65 IN | DIASTOLIC BLOOD PRESSURE: 85 MMHG | BODY MASS INDEX: 22.56 KG/M2 | WEIGHT: 135.4 LBS | HEART RATE: 90 BPM | SYSTOLIC BLOOD PRESSURE: 119 MMHG

## 2022-11-15 DIAGNOSIS — R14.2 BELCHING: ICD-10-CM

## 2022-11-15 DIAGNOSIS — R10.13 EPIGASTRIC PAIN: ICD-10-CM

## 2022-11-15 DIAGNOSIS — K92.1 BLACK STOOLS: Primary | ICD-10-CM

## 2022-11-15 DIAGNOSIS — R11.2 NAUSEA AND VOMITING, UNSPECIFIED VOMITING TYPE: ICD-10-CM

## 2022-11-15 DIAGNOSIS — R00.0 TACHYCARDIA: ICD-10-CM

## 2022-11-15 DIAGNOSIS — R19.7 DIARRHEA, UNSPECIFIED TYPE: ICD-10-CM

## 2022-11-15 DIAGNOSIS — K21.9 GASTROESOPHAGEAL REFLUX DISEASE, UNSPECIFIED WHETHER ESOPHAGITIS PRESENT: ICD-10-CM

## 2022-11-15 DIAGNOSIS — E78.00 HYPERCHOLESTEREMIA: ICD-10-CM

## 2022-11-15 DIAGNOSIS — I10 HYPERTENSION, UNSPECIFIED TYPE: ICD-10-CM

## 2022-11-15 DIAGNOSIS — R07.89 MIDSTERNAL CHEST PAIN: ICD-10-CM

## 2022-11-15 PROCEDURE — 99214 OFFICE O/P EST MOD 30 MIN: CPT | Performed by: PHYSICIAN ASSISTANT

## 2022-11-15 RX ORDER — PANTOPRAZOLE SODIUM 40 MG/1
40 TABLET, DELAYED RELEASE ORAL DAILY
Qty: 30 TABLET | Refills: 5 | Status: SHIPPED | OUTPATIENT
Start: 2022-11-15

## 2022-11-15 NOTE — PROGRESS NOTES
Chief Complaint   Patient presents with   • Abdominal Pain     X 4 months       Dominique De Dios is a 32 y.o. female who presents to the office today for evaluation of Abdominal Pain (X 4 months)  .    HPI  The patient was seen for a GI evaluation of abdominal pain.  The patient states that in July she had an episode of severe epigastric pain that radiated up through her sternum and through her back.  It starts out as dull and ends up being sharp.  Patient states that her symptoms did wax and wane but are now constant.  Patient is unsure what brings the episodes on or what relieves it.  She also reports abdominal bloating, nausea, vomiting, constipation.  She had black stools for one week in September that resolved. Patient also reports diarrhea that comes and goes on average of few days.  On the days she has diarrhea she has 2-3 episodes.  She identifies her stool consistency as type 3 or 4 on the [place] Stool Form Scale and types 6 and 7 on days she has diarrhea.   Patient has a history of acid reflux but does not take medication for it.  She has at least one episode of acid reflux each day as well as increased belching.  Patient has had tachycardia for 4 or 5 years.  She has not seen a Cardiologist since that time.  She still has her gallbladder.  Family history is positive for her maternal uncle having liver cancer and her mother has fatty liver.  She has never had a colonoscopy or EGD.  She denies having diabetes.  On 11/1/22 she had an US of her abdomen which was negative.  She had another US of her abdomen on 6/15/22 which reported very mild diffuse fatty infiltration of the liver. Labs done last month revealed normal liver chemistries and normal CBC.  She denies weight loss but does report fatigue that has been going on for 7 years.  She has high cholesterol, high blood pressure, and high heart rate.  She has tried zantac in the past for acid reflux.        Review of Systems   Constitutional: Negative for  "fever.   HENT: Negative for trouble swallowing.    Eyes: Negative.    Respiratory: Positive for shortness of breath.    Cardiovascular: Negative.    Gastrointestinal: Positive for abdominal distention, abdominal pain, anal bleeding, blood in stool, diarrhea, nausea and vomiting. Negative for constipation and rectal pain.   Endocrine: Negative.    Genitourinary: Negative.    Musculoskeletal: Negative.    Skin: Negative.    Allergic/Immunologic: Negative.    Neurological: Negative.    Hematological: Negative.    Psychiatric/Behavioral: Negative.        ACTIVE PROBLEMS:   Specialty Problems    None      PAST MEDICAL HISTORY:  Past Medical History:   Diagnosis Date   • Acid reflux    • Anxiety    • Migraine    • Migraines    • Urinary tract infection        SURGICAL HISTORY:  Past Surgical History:   Procedure Laterality Date   •  SECTION Bilateral 2016    Procedure:  SECTION PRIMARY;  Surgeon: Valdez Bach DO;  Location: [facility] LABOR DELIVERY;  Service:        FAMILY HISTORY:  Family History   Problem Relation Age of Onset   • Stroke Father    • Breast cancer Maternal Grandmother        SOCIAL HISTORY:  Social History     Tobacco Use   • Smoking status: Never   • Smokeless tobacco: Never   Substance Use Topics   • Alcohol use: No       CURRENT MEDICATION:    Current Outpatient Medications:   •  ibuprofen (ADVIL,MOTRIN) 800 MG tablet, Take 1 tablet by mouth Every 8 (Eight) Hours As Needed for Mild Pain  or Moderate Pain ., Disp: 40 tablet, Rfl: 0  •  pantoprazole (PROTONIX) 40 MG EC tablet, Take 1 tablet by mouth Daily., Disp: 30 tablet, Rfl: 5    ALLERGIES:  Ceclor [cefaclor]    VISIT VITALS:  Blood Pressure 119/85   Pulse 90   Height 165.1 cm (65\")   Weight 61.4 kg (135 lb 6.4 oz)   Body Mass Index 22.53 kg/m²   Physical Exam  Constitutional:       General: She is not in acute distress.     Appearance: She is well-developed. She is not diaphoretic.   HENT:      Head: Normocephalic " and atraumatic.      Right Ear: External ear normal.      Left Ear: External ear normal.      Nose: Nose normal.      Mouth/Throat:      Pharynx: No oropharyngeal exudate.   Eyes:      General: No scleral icterus.        Right eye: No discharge.         Left eye: No discharge.      Conjunctiva/sclera: Conjunctivae normal.      Pupils: Pupils are equal, round, and reactive to light.   Neck:      Thyroid: No thyromegaly.      Vascular: No JVD.      Trachea: No tracheal deviation.   Cardiovascular:      Rate and Rhythm: Normal rate and regular rhythm.      Heart sounds: Normal heart sounds. No murmur heard.    No friction rub. No gallop.   Pulmonary:      Effort: Pulmonary effort is normal. No respiratory distress.      Breath sounds: Normal breath sounds. No stridor. No wheezing or rales.   Chest:      Chest wall: No tenderness.   Abdominal:      General: Bowel sounds are normal. There is no distension.      Palpations: Abdomen is soft. There is no mass.      Tenderness: There is abdominal tenderness (RUQ). There is no guarding or rebound.      Hernia: No hernia is present.   Genitourinary:     Rectum: Guaiac result negative.   Musculoskeletal:      Cervical back: Normal range of motion and neck supple.   Lymphadenopathy:      Cervical: No cervical adenopathy.   Skin:     General: Skin is warm and dry.      Coloration: Skin is not pale.      Findings: No erythema or rash.   Neurological:      Mental Status: She is alert and oriented to person, place, and time.      Cranial Nerves: No cranial nerve deficit.      Motor: No abnormal muscle tone.      Coordination: Coordination normal.      Deep Tendon Reflexes: Reflexes are normal and symmetric. Reflexes normal.   Psychiatric:         Behavior: Behavior normal.         Thought Content: Thought content normal.         Judgment: Judgment normal.         Assessment & Plan      Diagnosis Plan   1. Black stools  Case Request    Follow Anesthesia Guidelines / Protocol     Obtain Informed Consent      2. Diarrhea, unspecified type        3. Epigastric pain  NM HIDA Scan With Pharmacological Intervention    Case Request    Follow Anesthesia Guidelines / Protocol    Obtain Informed Consent      4. Midsternal chest pain  Ambulatory Referral to Cardiology    Case Request    Follow Anesthesia Guidelines / Protocol    Obtain Informed Consent      5. Gastroesophageal reflux disease, unspecified whether esophagitis present  Case Request    Follow Anesthesia Guidelines / Protocol    Obtain Informed Consent      6. Belching  Case Request    Follow Anesthesia Guidelines / Protocol    Obtain Informed Consent      7. Hypertension, unspecified type  Ambulatory Referral to Cardiology      8. Tachycardia  Ambulatory Referral to Cardiology      9. Hypercholesteremia  Ambulatory Referral to Cardiology      10. Nausea and vomiting, unspecified vomiting type  NM HIDA Scan With Pharmacological Intervention    Case Request    Follow Anesthesia Guidelines / Protocol    Obtain Informed Consent        The patient will be referred to Cardiology for chest pain, tachycardia, hypercholesteremia, and hypertension.  She will be scheduled for an EGD to assess esophageal functioning.  She will be started on Protonix 40 mg daily.  She will also have a HIDA scan to assess function of gallbladder.  Recent US did not reveal gallstones.  Patient voiced understanding and agreement of recommendations.    Return in about 6 weeks (around 12/27/2022) for Recheck.         BMI is within normal parameters. No other follow-up for BMI required.      KRYSTLE Alvarez

## 2022-12-15 ENCOUNTER — APPOINTMENT (OUTPATIENT)
Dept: NUCLEAR MEDICINE | Facility: HOSPITAL | Age: 32
End: 2022-12-15
Payer: COMMERCIAL

## 2022-12-19 ENCOUNTER — HOSPITAL ENCOUNTER (OUTPATIENT)
Facility: HOSPITAL | Age: 32
Setting detail: HOSPITAL OUTPATIENT SURGERY
End: 2022-12-19
Attending: INTERNAL MEDICINE | Admitting: INTERNAL MEDICINE

## 2022-12-19 PROBLEM — R07.89 MIDSTERNAL CHEST PAIN: Status: ACTIVE | Noted: 2022-12-19

## 2022-12-19 PROBLEM — K92.1 BLACK STOOLS: Status: ACTIVE | Noted: 2022-12-19

## 2022-12-19 PROBLEM — R14.2 BELCHING: Status: ACTIVE | Noted: 2022-12-19

## 2022-12-19 PROBLEM — R11.2 NAUSEA AND VOMITING: Status: ACTIVE | Noted: 2022-12-19

## 2022-12-19 PROBLEM — K21.9 GASTROESOPHAGEAL REFLUX DISEASE: Status: ACTIVE | Noted: 2022-12-19

## 2022-12-19 PROBLEM — R10.13 EPIGASTRIC PAIN: Status: ACTIVE | Noted: 2022-12-19

## 2022-12-20 ENCOUNTER — OFFICE VISIT (OUTPATIENT)
Dept: CARDIOLOGY | Facility: CLINIC | Age: 32
End: 2022-12-20

## 2022-12-20 VITALS
HEIGHT: 65 IN | HEART RATE: 93 BPM | RESPIRATION RATE: 16 BRPM | OXYGEN SATURATION: 99 % | WEIGHT: 133.6 LBS | DIASTOLIC BLOOD PRESSURE: 78 MMHG | SYSTOLIC BLOOD PRESSURE: 108 MMHG | BODY MASS INDEX: 22.26 KG/M2

## 2022-12-20 DIAGNOSIS — E78.5 DYSLIPIDEMIA: ICD-10-CM

## 2022-12-20 DIAGNOSIS — D68.51 FACTOR 5 LEIDEN MUTATION, HETEROZYGOUS: ICD-10-CM

## 2022-12-20 DIAGNOSIS — R06.02 SHORTNESS OF BREATH: ICD-10-CM

## 2022-12-20 DIAGNOSIS — R07.2 PRECORDIAL CHEST PAIN: Primary | ICD-10-CM

## 2022-12-20 PROCEDURE — 93000 ELECTROCARDIOGRAM COMPLETE: CPT | Performed by: SPECIALIST

## 2022-12-20 PROCEDURE — 99204 OFFICE O/P NEW MOD 45 MIN: CPT | Performed by: SPECIALIST

## 2022-12-20 RX ORDER — MELOXICAM 15 MG/1
15 TABLET ORAL DAILY
Qty: 7 TABLET | Refills: 1 | Status: SHIPPED | OUTPATIENT
Start: 2022-12-20 | End: 2023-01-26

## 2022-12-20 RX ORDER — AMOXICILLIN 500 MG/1
1000 CAPSULE ORAL 2 TIMES DAILY
COMMUNITY
End: 2023-01-26

## 2022-12-20 NOTE — PROGRESS NOTES
Subjective   Initial consultation, shortness of breath, chest pain  Dominique De Dios is a 32 y.o. female who presents to day for Chest Pain (Rated 4-9, sharp pressure, burning), Palpitations (Hx tachycardia, worsened lately), Shortness of Breath (Routine activity), and Med Management (verbal).    CHIEF COMPLIANT  Chief Complaint   Patient presents with   • Chest Pain     Rated 4-9, sharp pressure, burning   • Palpitations     Hx tachycardia, worsened lately   • Shortness of Breath     Routine activity   • Med Management     verbal       Active Problems:  Problem List Items Addressed This Visit        Cardiac and Vasculature    Precordial chest pain - Primary    Overview     Added automatically from request for surgery [id number]         Relevant Medications    meloxicam (Mobic) 15 MG tablet    Other Relevant Orders    Treadmill Stress Test    Adult Transthoracic Echo Complete w/ Color, Spectral and Contrast if necessary per protocol    Dyslipidemia       Coag and Thromboembolic    Factor 5 Leiden mutation, heterozygous (HCC)       Pulmonary and Pneumonias    Shortness of breath    Relevant Orders    Treadmill Stress Test    Adult Transthoracic Echo Complete w/ Color, Spectral and Contrast if necessary per protocol       HPI  HPI  States back earlier in the summer she has been having constant left-sided chest pain sometimes radiating to the arm is much worse with moving the torso and taking a deep breath she was seen at the ER back in June and she had several testing including CT scan of the chest to rule out PE and that was negative she still having this nagging pain all of the time and now she is also getting short of breath doing any mild to moderate activity and when she does any more extreme activity she would get dizzy and almost passing out otherwise except for that the chest pain has been constant without any time without chest pain or told no significant edema or palpitation except on exertion used to do a lot  of physical activities now she is not able to do any of that she never smoked and recently she has noticed that her blood pressure is elevated no history of diabetes she does have history of hyperlipidemia but she is not taking any medications for that, her father has a history of coronary artery disease and CABG in her mother as well as sister both of them have factor V positive Leiden with history of deep vein thrombosis but she herself was positive herself for the factor V but she never had any clotting issues  PRIOR MEDS  Current Outpatient Medications on File Prior to Visit   Medication Sig Dispense Refill   • amoxicillin (AMOXIL) 500 MG capsule Take 1,000 mg by mouth 2 (Two) Times a Day.     • pantoprazole (PROTONIX) 40 MG EC tablet Take 1 tablet by mouth Daily. 30 tablet 5   • [DISCONTINUED] ibuprofen (ADVIL,MOTRIN) 800 MG tablet Take 1 tablet by mouth Every 8 (Eight) Hours As Needed for Mild Pain  or Moderate Pain . 40 tablet 0     No current facility-administered medications on file prior to visit.       ALLERGIES  Ceclor [cefaclor]    HISTORY  Past Medical History:   Diagnosis Date   • Acid reflux    • Anxiety    • Migraine    • Migraines    • Urinary tract infection        Social History     Socioeconomic History   • Marital status:    Tobacco Use   • Smoking status: Never   • Smokeless tobacco: Never   Substance and Sexual Activity   • Alcohol use: No   • Drug use: No   • Sexual activity: Yes     Partners: Male       Family History   Problem Relation Age of Onset   • Heart attack Father    • Heart disease Father    • Stroke Father    • Breast cancer Maternal Grandmother        Review of Systems   Respiratory: Positive for chest tightness and shortness of breath. Negative for apnea, cough, choking, wheezing and stridor.    Cardiovascular: Positive for chest pain and palpitations. Negative for leg swelling.       Objective     VITALS: /78 (BP Location: Left arm)   Pulse 93   Resp 16   Ht  "165.1 cm (65\")   Wt 60.6 kg (133 lb 9.6 oz)   SpO2 99%   BMI 22.23 kg/m²     LABS:   Lab Results (most recent)     None          IMAGING:   US Abdomen Complete    Result Date: 11/1/2022  Nothing seen on today's exam to specifically account for the patient's symptoms  This report was finalized on 11/1/2022 2:53 PM by Dr. Mike Wright MD.        EXAM:  Physical Exam  Vitals reviewed.   Constitutional:       Appearance: She is well-developed.   HENT:      Head: Normocephalic and atraumatic.   Eyes:      Pupils: Pupils are equal, round, and reactive to light.   Neck:      Thyroid: No thyromegaly.      Vascular: No JVD.   Cardiovascular:      Rate and Rhythm: Normal rate and regular rhythm.      Heart sounds: Normal heart sounds. No murmur heard.    No friction rub. No gallop.      Comments: Tenderness rib cage, reproducing pain  Pulmonary:      Effort: Pulmonary effort is normal. No respiratory distress.      Breath sounds: Normal breath sounds. No stridor. No wheezing or rales.   Chest:      Chest wall: No tenderness.   Musculoskeletal:         General: No tenderness or deformity.      Cervical back: Neck supple.   Skin:     General: Skin is warm and dry.   Neurological:      Mental Status: She is alert and oriented to person, place, and time.      Cranial Nerves: No cranial nerve deficit.      Coordination: Coordination normal.         Procedure     ECG 12 Lead    Date/Time: 12/20/2022 11:51 AM  Performed by: Christian Harris MD  Authorized by: Christian Harris MD           EKG: Showed normal sinus rhythm with borderline LVH otherwise essentially unremarkable EKG compared with EKG on 6/8/2022 nonspecific ST-T changes also seen at the time is not present today       Assessment & Plan     Diagnoses and all orders for this visit:    1. Precordial chest pain (Primary)  -     ECG 12 Lead  -     Treadmill Stress Test; Future  -     Adult Transthoracic Echo Complete w/ Color, Spectral and Contrast if necessary per protocol; " Future  -     meloxicam (Mobic) 15 MG tablet; Take 1 tablet by mouth Daily.  Dispense: 7 tablet; Refill: 1    2. Shortness of breath  -     ECG 12 Lead  -     Treadmill Stress Test; Future  -     Adult Transthoracic Echo Complete w/ Color, Spectral and Contrast if necessary per protocol; Future    3. Dyslipidemia    4. Factor 5 Leiden mutation, heterozygous (HCC)    1.  The chest pain is rather atypical and has been constant for almost now 6-month without everything I doubt this is related to any kind of cardiac ischemia she is slightly tender also on the chest wall which is reproducing the pain this could be musculoskeletal but my main concern also she is has a history of factor V Leiden positive however the CT scan has ruled out pulmonary embolism back in June so I am going first with proceed with treadmill exercise stress testing and also an echocardiac to assess her cardiac function wall motion and valve morphology, if these are all not revealing we may have to repeat the CT scan of the chest to ensure no evidence of any pulmonary embolism in the interim I am going to give her a course of Mobic for a week and see if this has any effect if this is musculoskeletal on the pain  2.  I reviewed her lipids which excellent HDL but elevated LDL so we will we will watch this for now she has a family history of coronary artery disease and her father may have to treat this if this continues to be elevated so probably repeat her labs in about 3 to 6 months      Return in about 4 weeks (around 1/17/2023).               MEDS ORDERED DURING VISIT:  New Medications Ordered This Visit   Medications   • meloxicam (Mobic) 15 MG tablet     Sig: Take 1 tablet by mouth Daily.     Dispense:  7 tablet     Refill:  1       As always, Penny Sandy APRN  I appreciate very much the opportunity to participate in the cardiovascular care of your patients. Please do not hesitate to call me with any questions with regards to Dominique  KEILA De Dios evaluation and management.         This document has been electronically signed by Christian Harris MD  December 20, 2022 13:10 EST

## 2022-12-21 ENCOUNTER — TELEPHONE (OUTPATIENT)
Dept: UROLOGY | Facility: CLINIC | Age: 32
End: 2022-12-21

## 2022-12-21 NOTE — TELEPHONE ENCOUNTER
Patient needs you to nehal her back because she has to reschedule her procedure until after she has a stress and eco done.

## 2022-12-22 NOTE — TELEPHONE ENCOUNTER
Spoke with patient and she had to cancel due to some other test she has to have first. She stated she will call back

## 2022-12-29 ENCOUNTER — PATIENT ROUNDING (BHMG ONLY) (OUTPATIENT)
Dept: CARDIOLOGY | Facility: CLINIC | Age: 32
End: 2022-12-29

## 2022-12-29 NOTE — PROGRESS NOTES
Sen    My name is Yanira     I am  with [facility]  [facility]  [address] [place] [address] [phone].    I am calling to officially welcome you to our practice and ask about your visit. If there is anything at all we can do for you please let us know. We're always looking for ways to make our patients' experiences even better. If you have any recommendations on ways we may improve please don't hesitate to call.     Thank you so much and have a marvelous day.

## 2023-01-11 ENCOUNTER — HOSPITAL ENCOUNTER (OUTPATIENT)
Dept: CARDIOLOGY | Facility: HOSPITAL | Age: 33
Discharge: HOME | End: 2023-01-11
Payer: COMMERCIAL

## 2023-01-11 DIAGNOSIS — R06.02 SHORTNESS OF BREATH: ICD-10-CM

## 2023-01-11 DIAGNOSIS — R07.2 PRECORDIAL CHEST PAIN: ICD-10-CM

## 2023-01-11 LAB
BH CV STRESS BP STAGE 1: NORMAL
BH CV STRESS BP STAGE 2: NORMAL
BH CV STRESS BP STAGE 3: NORMAL
BH CV STRESS DURATION MIN STAGE 1: 3
BH CV STRESS DURATION MIN STAGE 2: 3
BH CV STRESS DURATION MIN STAGE 3: 3
BH CV STRESS DURATION MIN STAGE 4: 1
BH CV STRESS DURATION SEC STAGE 1: 0
BH CV STRESS DURATION SEC STAGE 2: 0
BH CV STRESS DURATION SEC STAGE 3: 0
BH CV STRESS DURATION SEC STAGE 4: 0
BH CV STRESS GRADE STAGE 1: 10
BH CV STRESS GRADE STAGE 2: 12
BH CV STRESS GRADE STAGE 3: 14
BH CV STRESS GRADE STAGE 4: 16
BH CV STRESS HR STAGE 1: 144
BH CV STRESS HR STAGE 2: 157
BH CV STRESS HR STAGE 3: 171
BH CV STRESS HR STAGE 4: 181
BH CV STRESS METS STAGE 1: 5
BH CV STRESS METS STAGE 2: 7.5
BH CV STRESS METS STAGE 3: 10
BH CV STRESS METS STAGE 4: 13.5
BH CV STRESS PROTOCOL 1: NORMAL
BH CV STRESS RECOVERY BP: NORMAL MMHG
BH CV STRESS RECOVERY HR: 116 BPM
BH CV STRESS SPEED STAGE 1: 1.7
BH CV STRESS SPEED STAGE 2: 2.5
BH CV STRESS SPEED STAGE 3: 3.4
BH CV STRESS SPEED STAGE 4: 4.2
BH CV STRESS STAGE 1: 1
BH CV STRESS STAGE 2: 2
BH CV STRESS STAGE 3: 3
BH CV STRESS STAGE 4: 4
MAXIMAL PREDICTED HEART RATE: 188 BPM
PERCENT MAX PREDICTED HR: 96.28 %
STRESS BASELINE BP: NORMAL MMHG
STRESS BASELINE HR: 116 BPM
STRESS PERCENT HR: 113 %
STRESS POST ESTIMATED WORKLOAD: 13.4 METS
STRESS POST EXERCISE DUR MIN: 10 MIN
STRESS POST PEAK BP: NORMAL MMHG
STRESS POST PEAK HR: 181 BPM
STRESS TARGET HR: 160 BPM

## 2023-01-11 PROCEDURE — 93306 TTE W/DOPPLER COMPLETE: CPT

## 2023-01-11 PROCEDURE — 93018 CV STRESS TEST I&R ONLY: CPT | Performed by: SPECIALIST

## 2023-01-11 PROCEDURE — 93306 TTE W/DOPPLER COMPLETE: CPT | Performed by: SPECIALIST

## 2023-01-11 PROCEDURE — 93017 CV STRESS TEST TRACING ONLY: CPT

## 2023-01-12 LAB
BH CV ECHO MEAS - ACS: 1.8 CM
BH CV ECHO MEAS - AO MAX PG: 7.8 MMHG
BH CV ECHO MEAS - AO MEAN PG: 4 MMHG
BH CV ECHO MEAS - AO ROOT DIAM: 2.4 CM
BH CV ECHO MEAS - AO V2 MAX: 140 CM/SEC
BH CV ECHO MEAS - AO V2 VTI: 21.7 CM
BH CV ECHO MEAS - EDV(CUBED): 56.2 ML
BH CV ECHO MEAS - EDV(MOD-SP4): 43.1 ML
BH CV ECHO MEAS - EF(MOD-SP4): 67.7 %
BH CV ECHO MEAS - ESV(CUBED): 11.3 ML
BH CV ECHO MEAS - ESV(MOD-SP4): 13.9 ML
BH CV ECHO MEAS - FS: 41.4 %
BH CV ECHO MEAS - IVS/LVPW: 1.06 CM
BH CV ECHO MEAS - IVSD: 0.81 CM
BH CV ECHO MEAS - LA DIMENSION: 1.8 CM
BH CV ECHO MEAS - LAT PEAK E' VEL: 15.7 CM/SEC
BH CV ECHO MEAS - LV DIASTOLIC VOL/BSA (35-75): 25.9 CM2
BH CV ECHO MEAS - LV MASS(C)D: 84.9 GRAMS
BH CV ECHO MEAS - LV SYSTOLIC VOL/BSA (12-30): 8.4 CM2
BH CV ECHO MEAS - LVIDD: 3.8 CM
BH CV ECHO MEAS - LVIDS: 2.25 CM
BH CV ECHO MEAS - LVOT AREA: 2.8 CM2
BH CV ECHO MEAS - LVOT DIAM: 1.9 CM
BH CV ECHO MEAS - LVPWD: 0.76 CM
BH CV ECHO MEAS - MED PEAK E' VEL: 10 CM/SEC
BH CV ECHO MEAS - MV A MAX VEL: 69 CM/SEC
BH CV ECHO MEAS - MV E MAX VEL: 59.6 CM/SEC
BH CV ECHO MEAS - MV E/A: 0.86
BH CV ECHO MEAS - PA ACC TIME: 0.09 SEC
BH CV ECHO MEAS - PA PR(ACCEL): 40.8 MMHG
BH CV ECHO MEAS - SI(MOD-SP4): 17.6 ML/M2
BH CV ECHO MEAS - SV(MOD-SP4): 29.2 ML
BH CV ECHO MEAS - TAPSE (>1.6): 1.72 CM
BH CV ECHO MEASUREMENTS AVERAGE E/E' RATIO: 4.64
LEFT ATRIUM VOLUME INDEX: 6.3 ML/M2
MAXIMAL PREDICTED HEART RATE: 188 BPM
STRESS TARGET HR: 160 BPM

## 2023-01-26 ENCOUNTER — OFFICE VISIT (OUTPATIENT)
Dept: CARDIOLOGY | Facility: CLINIC | Age: 33
End: 2023-01-26
Payer: COMMERCIAL

## 2023-01-26 VITALS
DIASTOLIC BLOOD PRESSURE: 84 MMHG | BODY MASS INDEX: 22.16 KG/M2 | SYSTOLIC BLOOD PRESSURE: 116 MMHG | HEIGHT: 65 IN | HEART RATE: 96 BPM | OXYGEN SATURATION: 98 % | WEIGHT: 133 LBS

## 2023-01-26 DIAGNOSIS — R00.2 PALPITATIONS: Primary | ICD-10-CM

## 2023-01-26 DIAGNOSIS — R07.2 PRECORDIAL CHEST PAIN: ICD-10-CM

## 2023-01-26 DIAGNOSIS — F41.9 ANXIETY: ICD-10-CM

## 2023-01-26 PROCEDURE — 99214 OFFICE O/P EST MOD 30 MIN: CPT | Performed by: NURSE PRACTITIONER

## 2023-01-26 PROCEDURE — 93270 REMOTE 30 DAY ECG REV/REPORT: CPT | Performed by: SPECIALIST

## 2023-01-26 NOTE — PROGRESS NOTES
[facility]             JelenaKANDY Bro Sheila Denise, APRN  Dominique De Dios  1990 01/26/2023    Patient Active Problem List   Diagnosis   • Generalized anxiety disorder   • Black stools   • Epigastric pain   • Precordial chest pain   • Gastroesophageal reflux disease   • Belching   • Nausea and vomiting   • Shortness of breath   • Dyslipidemia   • Factor 5 Leiden mutation, heterozygous (HCC)       Dear Penny Sandy APRN:    Subjective     Chief Complaint   Patient presents with   • Med Management     VERBAL.    • Results     Stress and echo results.    • Shortness of Breath   • Chest Pain   • Palpitations   • Edema       HPI:     This is a 32 y.o. female with known past medical history of generalized anxiety disorder, GERD, dyslipidemia and factor V mutation.    Dominique De Dios presents today for routine cardiology follow up.  Patient states since her last visit she has continued to have chest pressure and palpitations with tachycardia.  She was started on Mobic at her last visit but states she did not see any improvement of her symptoms with Mobic.  Echocardiogram showed normal LV function and normal valvular function.  Treadmill stress test was negative for any acute ischemia.  Patient does report some increased anxiety for which she has struggled with for a long time and has admittedly been on medications but has had side effects with them.  She also used to take atenolol but stopped secondary to fatigue and weight gain.  She states she is just concerned that something may be wrong with her heart and she would like to back to doing exercise as she has not been doing this secondary to her current issues with tachycardia and palpitations.  Lab work obtained by her PCP showed normal kidney function and electrolytes along with hemoglobin A1c stable.  Lipid panel showed LDL elevated in the 150s.    • Diagnostic Testing  1. Echocardiogram  1/2022: EF 61 to 65%  2. Treadmill stress test 1/2022: Findings consistent with a normal ECG stress test     All other systems were reviewed and were negative.    Patient Active Problem List   Diagnosis   • Generalized anxiety disorder   • Black stools   • Epigastric pain   • Precordial chest pain   • Gastroesophageal reflux disease   • Belching   • Nausea and vomiting   • Shortness of breath   • Dyslipidemia   • Factor 5 Leiden mutation, heterozygous (HCC)       family history includes Breast cancer in her maternal grandmother; Heart attack in her father; Heart disease in her father; Stroke in her father.     reports that she has never smoked. She has never used smokeless tobacco. She reports that she does not drink alcohol and does not use drugs.    Allergies   Allergen Reactions   • Ceclor [Cefaclor] Rash     childhood         Current Outpatient Medications:   •  pantoprazole (PROTONIX) 40 MG EC tablet, Take 1 tablet by mouth Daily., Disp: 30 tablet, Rfl: 5      Physical Exam:  I have reviewed the patient's current vital signs as documented in the patient's EMR.   Vitals:    01/26/23 1500   BP: 116/84   Pulse: 96   SpO2: 98%     Body mass index is 22.13 kg/m².       01/26/23  1500   Weight: 60.3 kg (133 lb)      Physical Exam  Constitutional:       General: She is not in acute distress.     Appearance: Normal appearance. She is well-developed and normal weight.   HENT:      Head: Normocephalic and atraumatic.   Eyes:      General: Lids are normal.      Conjunctiva/sclera: Conjunctivae normal.      Pupils: Pupils are equal, round, and reactive to light.   Neck:      Vascular: No carotid bruit or JVD.   Cardiovascular:      Rate and Rhythm: Normal rate and regular rhythm.      Pulses: Normal pulses.      Heart sounds: Normal heart sounds, S1 normal and S2 normal. No murmur heard.  Pulmonary:      Effort: Pulmonary effort is normal. No respiratory distress.      Breath sounds: Normal breath sounds. No wheezing.    Abdominal:      General: Bowel sounds are normal. There is no distension.      Palpations: Abdomen is soft. There is no hepatomegaly or splenomegaly.      Tenderness: There is no abdominal tenderness.   Musculoskeletal:         General: No swelling. Normal range of motion.      Cervical back: Normal range of motion and neck supple.      Right lower leg: No edema.      Left lower leg: No edema.   Skin:     General: Skin is warm and dry.      Coloration: Skin is not jaundiced.      Findings: No rash.   Neurological:      General: No focal deficit present.      Mental Status: She is alert and oriented to person, place, and time. Mental status is at baseline.   Psychiatric:         Mood and Affect: Mood normal.         Speech: Speech normal.         Behavior: Behavior normal.         Thought Content: Thought content normal.         Judgment: Judgment normal.            DATA REVIEWED:     TTE/NIKKI:  Results for orders placed during the hospital encounter of 01/11/23    Adult Transthoracic Echo Complete w/ Color, Spectral and Contrast if necessary per protocol    Interpretation Summary  •  Left ventricular systolic function is normal. Left ventricular ejection fraction appears to be 61 - 65%.  •  Left ventricular diastolic function is consistent with (grade I) impaired relaxation.      Laboratory evaluations:    Lab Results   Component Value Date    GLUCOSE 85 06/08/2022    BUN 8 06/08/2022    CREATININE 0.89 06/08/2022    EGFRIFNONA 70 01/13/2021    EGFRIFAFRI  08/29/2016      Comment:      <15 Indicative of kidney failure.    BCR 9.0 06/08/2022    K 4.2 06/08/2022    CO2 22.0 06/08/2022    CALCIUM 9.3 06/08/2022    ALBUMIN 4.32 06/08/2022    AST 26 06/08/2022    ALT 32 06/08/2022     Lab Results   Component Value Date    WBC 10.10 06/08/2022    HGB 14.5 06/08/2022    HCT 42.8 06/08/2022    MCV 93.0 06/08/2022     06/08/2022     Lab Results   Component Value Date    CHOL 136 03/21/2019    TRIG 62 03/21/2019     HDL 49 03/21/2019    LDL 75 03/21/2019     Lab Results   Component Value Date    TSH 1.610 03/20/2019     No results found for: HGBA1C  Lab Results   Component Value Date    ALT 32 06/08/2022     No results found for: HGBA1C  Lab Results   Component Value Date    CREATININE 0.89 06/08/2022     No results found for: IRON, TIBC, FERRITIN  Lab Results   Component Value Date    INR 0.91 01/13/2021    INR 0.96 03/20/2019    PROTIME 12.1 01/13/2021    PROTIME 13.0 03/20/2019           --------------------------------------------------------------------------------------------------------------------------    ASSESSMENT/PLAN:      Diagnosis Plan   1. Palpitations  Cardiac Event Monitor      2. Anxiety  Ambulatory Referral to Behavioral Health      3. Precordial chest pain  CT Angiogram Coronary          1. Chest pain  2. Anxiety  3. Palpitations  • Patient continues to report palpitations along with chest pressure and tachycardia.  Treadmill stress test was negative for ischemia.  Echocardiogram showed normal LV function.  Will obtain 14-day event monitor to rule out any arrhythmias and assess possible PVC burden causing her palpitations.  For her chest pain we will proceed with CT coronary angiogram to rule out any ischemia causing her chest pain.  I did discuss with her that her anxiety may be possibly contributing to her symptoms therefore we will refer her to behavioral health for possible treatment regarding her anxiety which may improve some of her symptoms.  She has tried beta-blocker in the past and this was stopped secondary to fatigue and weight gain.    4. Dyslipidemia  • Lipid panel obtained by PCP showed LDL in the 150s.  Discussed with patient about lifestyle modification with diet and exercise.      This document has been @Electronically signed by KANDY Boswell, 01/26/23, 2:46 PM EST.       Dictated Utilizing Dragon Dictation: Part of this note may be an electronic transcription/translation of spoken  language to printed text using the Dragon Dictation System.    Follow-up appointment and medication changes provided in hand delivered After Visit Summary as well as reviewed in the room.

## 2023-02-21 ENCOUNTER — TREATMENT (OUTPATIENT)
Dept: CARDIOLOGY | Facility: CLINIC | Age: 33
End: 2023-02-21
Payer: COMMERCIAL

## 2023-02-21 DIAGNOSIS — R00.2 PALPITATIONS: ICD-10-CM

## 2023-02-21 PROCEDURE — 93272 ECG/REVIEW INTERPRET ONLY: CPT | Performed by: SPECIALIST

## 2023-05-03 ENCOUNTER — TELEPHONE (OUTPATIENT)
Dept: CARDIOLOGY | Facility: CLINIC | Age: 33
End: 2023-05-03
Payer: COMMERCIAL

## 2023-05-03 NOTE — TELEPHONE ENCOUNTER
HUB CAN READ:    Tried to call patient to ask if she was planning on having her CTA ordered .  She N/S her 04/17/2023 appt for it.  If she is she needs to r/s it and then r/s her appt here for after.  If she isn't planning on doing this test she can keep the appt as scheduled on Monday.

## 2023-05-08 ENCOUNTER — TELEPHONE (OUTPATIENT)
Dept: CARDIOLOGY | Facility: CLINIC | Age: 33
End: 2023-05-08

## 2023-07-20 PROBLEM — K76.0 STEATOSIS OF LIVER: Status: ACTIVE | Noted: 2022-06-15

## 2023-08-24 ENCOUNTER — TELEMEDICINE (OUTPATIENT)
Dept: PSYCHIATRY | Facility: CLINIC | Age: 33
End: 2023-08-24
Payer: COMMERCIAL

## 2023-08-24 DIAGNOSIS — F43.10 POST TRAUMATIC STRESS DISORDER (PTSD): ICD-10-CM

## 2023-08-24 DIAGNOSIS — F41.1 GENERALIZED ANXIETY DISORDER: ICD-10-CM

## 2023-08-24 DIAGNOSIS — F33.2 SEVERE EPISODE OF RECURRENT MAJOR DEPRESSIVE DISORDER, WITHOUT PSYCHOTIC FEATURES: Primary | ICD-10-CM

## 2023-08-24 DIAGNOSIS — G47.9 SLEEPING DIFFICULTY: ICD-10-CM

## 2023-08-24 PROCEDURE — 99214 OFFICE O/P EST MOD 30 MIN: CPT

## 2023-08-24 RX ORDER — TRAZODONE HYDROCHLORIDE 50 MG/1
50 TABLET ORAL NIGHTLY
Qty: 14 TABLET | Refills: 0 | Status: SHIPPED | OUTPATIENT
Start: 2023-08-24 | End: 2023-09-07

## 2023-08-24 RX ORDER — PRAZOSIN HYDROCHLORIDE 1 MG/1
1 CAPSULE ORAL NIGHTLY
Qty: 14 CAPSULE | Refills: 0 | Status: SHIPPED | OUTPATIENT
Start: 2023-08-24 | End: 2023-09-07

## 2023-08-24 NOTE — PROGRESS NOTES
"Bobo De Dios is a 33 y.o. female who is here today for medication management follow up encounter.     "This provider is located at [facility], [address] [place] [address]. The provider identified herself as well as her credentials.   The Patient is at/in home address  by herself/himself, using her/his phone to access video application via [employer]. The patient's condition being diagnosed/treated is appropriate for telemedicine. The patient gave consent to be seen remotely, and when consent is given they understand that the consent allows for patient identifiable information to be sent to a third party as needed.   They may refuse to be seen remotely at any time. The electronic data is encrypted and password protected, and the patient has been advised of the potential risks to privacy not withstanding such measures."     Start: 1546  Conclusion: 1607    Chief Complaint:  anxiety, depression     HPI:  History of Present Illness    She reports that she has been struggling with nausea and vomiting with the use of Viibryd. She reports that she feels that she \"is not good.\" Continues to note low mood, crying spells, sadness, low motivation, decreased enjoyment, and disordered sleep. Anxiety is \"real bad.\" She reports that she is sleeping about 3 hours per night and is experiencing nightmares that are distressing to her. She reports that she has had to miss days of work because of her mood. Appetite is variable. She denies AVH. She denies SI and HI. She denies SH.     Past Psych History: Diagnosed with anxiety and depression per PCP.  Medication management in the past has been by primary care.  Denies any inpatient psychiatric hospitalizations or outpatient providers.  Denies a history of TBI or seizures.  Denies any knowledge of exposure to illicit substance or toxins while in utero.  Denies any knowledge of  complications.    Previous Psych Meds: Patient reports that in the " past she has previously trialed Wellbutrin, Prozac, Lexapro, Celexa, and BuSpar.  Reports that she does feel she has tried other ones but cannot recall them at this time.    Substance Abuse: Patient denies a history of or present illicit substance use, nicotine.  Does report current utilization socially of alcohol.    Social History: Patient was born and raised in [place], [place] moving to [place] during high school.  Parents .  1 sister.  High school and college graduate.  Employed as a .  Previous marriage and .  1 daughter age 6.  Denies any incarcerations or previous  service.    Family Psychiatric History:  family history includes Breast cancer in her maternal grandmother; Heart attack in her father; Heart disease in her father; Stroke in her father.    Medical/Surgical History:  Past Medical History:   Diagnosis Date    Acid reflux     Anxiety     Elevated cholesterol     Hypertension     Migraine     Migraines     Urinary tract infection      Past Surgical History:   Procedure Laterality Date     SECTION Bilateral 2016    Procedure:  SECTION PRIMARY;  Surgeon: Vadlez Bach DO;  Location: [facility] LABOR DELIVERY;  Service:        Allergies   Allergen Reactions    Ceclor [Cefaclor] Rash     childhood           Current Medications:   Current Outpatient Medications   Medication Sig Dispense Refill    prazosin (MINIPRESS) 1 MG capsule Take 1 capsule by mouth Every Night for 14 days. 14 capsule 0    traZODone (DESYREL) 50 MG tablet Take 1 tablet by mouth Every Night for 14 days. 14 tablet 0     No current facility-administered medications for this visit.         Review of Systems   Constitutional:  Positive for activity change, appetite change and fatigue.   HENT: Negative.     Eyes: Negative.    Respiratory: Negative.     Cardiovascular: Negative.    Gastrointestinal: Negative.    Endocrine: Negative.    Genitourinary:  Negative.    Musculoskeletal: Negative.    Skin: Negative.    Allergic/Immunologic: Negative.    Neurological: Negative.    Hematological: Negative.    Psychiatric/Behavioral:  Positive for decreased concentration, dysphoric mood and sleep disturbance. Negative for self-injury and suicidal ideas. The patient is nervous/anxious.   denies HEENT, cardiovascular, respiratory, liver, renal, GI/, endocrine, neuro, DERM, hematology, immunology, musculoskeletal disorders.    Objective   Physical Exam  Vitals reviewed.   Constitutional:       Appearance: Normal appearance.   HENT:      Head: Normocephalic.      Nose: Nose normal.      Mouth/Throat:      Mouth: Mucous membranes are moist.      Pharynx: Oropharynx is clear.   Eyes:      Extraocular Movements: Extraocular movements intact.      Pupils: Pupils are equal, round, and reactive to light.   Pulmonary:      Effort: Pulmonary effort is normal.   Musculoskeletal:         General: Normal range of motion.      Cervical back: Normal range of motion.   Skin:     General: Skin is warm and dry.   Neurological:      General: No focal deficit present.      Mental Status: She is alert and oriented to person, place, and time.   Psychiatric:         Attention and Perception: Attention and perception normal. She is attentive.         Mood and Affect: Mood is anxious. Affect is tearful.         Speech: Speech normal.         Behavior: Behavior normal. Behavior is cooperative.         Thought Content: Thought content normal.         Cognition and Memory: Cognition and memory normal.         Judgment: Judgment normal. Judgment is not impulsive.   not currently breastfeeding.    Mental Status Exam:   Hygiene:   good  Cooperation:  Cooperative  Eye Contact:  Fair  Psychomotor Behavior:  Appropriate  Affect:   Tearful  Hopelessness: 3  Speech:  Normal and Minimal  Thought Process:  Goal directed and Linear  Thought Content:  Normal and Mood congruent  Suicidal:  None  Homicidal:   None  Hallucinations:  None  Delusion:  None  Memory:  Intact  Orientation:  Person, Place, Time, and Situation  Reliability:  fair  Insight:  Fair  Judgement:  Fair  Impulse Control:  Fair  Physical/Medical Issues:  No       Short-term goals: Patient will be compliant with clinic appointments.  Patient will be engaged in therapy, medication compliant with minimal side effects. Patient  will report decrease of symptoms and frequency.    Long-term goals: Patient will have minimal symptoms of  with continued medication management. Patient will be compliant with treatment and appointments.     Strengths: Motivation for treatment, insight  Weaknesses: Coping skills, support    Prognosis: Guarded dependent on medication/follow up and treatment plan compliance.  Functional Status:  severe impairment in areas of daily functioning.      Assessment & Plan   Diagnoses and all orders for this visit:    1. Severe episode of recurrent major depressive disorder, without psychotic features (Primary)  -     traZODone (DESYREL) 50 MG tablet; Take 1 tablet by mouth Every Night for 14 days.  Dispense: 14 tablet; Refill: 0    2. Generalized anxiety disorder  -     traZODone (DESYREL) 50 MG tablet; Take 1 tablet by mouth Every Night for 14 days.  Dispense: 14 tablet; Refill: 0    3. Post traumatic stress disorder (PTSD)  -     prazosin (MINIPRESS) 1 MG capsule; Take 1 capsule by mouth Every Night for 14 days.  Dispense: 14 capsule; Refill: 0  -     traZODone (DESYREL) 50 MG tablet; Take 1 tablet by mouth Every Night for 14 days.  Dispense: 14 tablet; Refill: 0    4. Sleeping difficulty  -     prazosin (MINIPRESS) 1 MG capsule; Take 1 capsule by mouth Every Night for 14 days.  Dispense: 14 capsule; Refill: 0  -     traZODone (DESYREL) 50 MG tablet; Take 1 tablet by mouth Every Night for 14 days.  Dispense: 14 tablet; Refill: 0      -UDS reviewed and appropriate  -Reduce viibryd to 10 mg po daily for anxiety and depression  -Start  trazodone 50 mg nightly for sleep  -Start prazosin 1 mg nightly for nightmares.  -DC hydroxyzine   -Schedule for psychotherapy  -Medications at pharmacy at this time    Discussed medication and psychotherapy options. Patient is to reduce dose of viibryd, likely with discontinue at next visit and start mood stabilizer. Start trazodone and prazosin for sleep/nightmares.  Discussed the risks, benefits, and side effects of the medication; client acknowledged and verbally consented.  Patient is aware to contact the [facility] with any worsening of symptom.  Patient is agreeable to go to the ER or call 911 should they begin SI/HI.      Return in about 1 week (around 8/31/2023), or if symptoms worsen or fail to improve, for Next scheduled follow up.        This document has been electronically signed by KANDY Reyes   August 24, 2023 16:34 EDT     Errors in dictation may reflect use of voice recognition software and not all errors in transcription may have been detected prior to signing.

## 2023-08-25 ENCOUNTER — TELEPHONE (OUTPATIENT)
Dept: FAMILY MEDICINE CLINIC | Facility: CLINIC | Age: 33
End: 2023-08-25
Payer: COMMERCIAL

## 2023-08-25 NOTE — TELEPHONE ENCOUNTER
Dominique called and she thought you were going to sent her in a mood stabilizer?      Please advise    Informed patient you were out till Monday , she verbalized understanding

## 2023-08-28 NOTE — TELEPHONE ENCOUNTER
Spoke with patient she verbalized understanding.  She does have a time for this week.  Do I need to schedule her one?      Please advise

## 2023-08-31 ENCOUNTER — TELEMEDICINE (OUTPATIENT)
Dept: PSYCHIATRY | Facility: CLINIC | Age: 33
End: 2023-08-31
Payer: COMMERCIAL

## 2023-08-31 DIAGNOSIS — G47.9 SLEEPING DIFFICULTY: ICD-10-CM

## 2023-08-31 DIAGNOSIS — F43.10 POST TRAUMATIC STRESS DISORDER (PTSD): ICD-10-CM

## 2023-08-31 DIAGNOSIS — F33.2 SEVERE EPISODE OF RECURRENT MAJOR DEPRESSIVE DISORDER, WITHOUT PSYCHOTIC FEATURES: Primary | ICD-10-CM

## 2023-08-31 DIAGNOSIS — F41.1 GENERALIZED ANXIETY DISORDER: ICD-10-CM

## 2023-08-31 PROCEDURE — 99214 OFFICE O/P EST MOD 30 MIN: CPT

## 2023-08-31 RX ORDER — ARIPIPRAZOLE 5 MG/1
TABLET ORAL
Qty: 12 TABLET | Refills: 0 | Status: SHIPPED | OUTPATIENT
Start: 2023-08-31 | End: 2023-09-14

## 2023-08-31 RX ORDER — PRAZOSIN HYDROCHLORIDE 1 MG/1
1 CAPSULE ORAL NIGHTLY
Qty: 30 CAPSULE | Refills: 0 | Status: SHIPPED | OUTPATIENT
Start: 2023-08-31 | End: 2023-09-30

## 2023-08-31 RX ORDER — TRAZODONE HYDROCHLORIDE 100 MG/1
100 TABLET ORAL NIGHTLY
Qty: 14 TABLET | Refills: 0 | Status: SHIPPED | OUTPATIENT
Start: 2023-08-31 | End: 2023-09-07 | Stop reason: SDUPTHER

## 2023-08-31 NOTE — PROGRESS NOTES
"Subjective   Dominique De Dios is a 33 y.o. female who is here today for medication management follow up encounter.     “This provider is located at [facility], [address] [place] [address]. The provider identified herself as well as her credentials.   The Patient is at/in home address  by herself/himself, using her/his phone to access video application via [employer]. The patient's condition being diagnosed/treated is appropriate for telemedicine. The patient gave consent to be seen remotely, and when consent is given they understand that the consent allows for patient identifiable information to be sent to a third party as needed.   They may refuse to be seen remotely at any time. The electronic data is encrypted and password protected, and the patient has been advised of the potential risks to privacy not withstanding such measures.”     Start: 1632  Conclusion: 1650    Chief Complaint:  anxiety, depression     HPI:  History of Present Illness    Patient reports that she discontinued Viibryd associated with continued nausea.  She feels that prazosin has been effective for nightmares management and she has not had any difficulty.  Reports trazodone has been helpful for sleep, but is struggling still with initiation averaging about 4 hours per night.  She feels that \"depression has not been as bad.\"  Anxiety continues to be elevated.  No longer having feelings of helplessness or hopelessness.  No longer having thoughts of death.  Feels that she has noticed a positive improvement in motivation and feels that she is less irritable.She reports that she has had to miss days of work because of her mood. Appetite is variable. She denies AVH. She denies SI and HI. She denies SH.     Past Psych History: Diagnosed with anxiety and depression per PCP.  Medication management in the past has been by primary care.  Denies any inpatient psychiatric hospitalizations or outpatient providers.  Denies a history of " TBI or seizures.  Denies any knowledge of exposure to illicit substance or toxins while in utero.  Denies any knowledge of  complications.    Previous Psych Meds: Patient reports that in the past she has previously trialed Wellbutrin, Prozac, Lexapro, Celexa, and BuSpar.  Reports that she does feel she has tried other ones but cannot recall them at this time.    Substance Abuse: Patient denies a history of or present illicit substance use, nicotine.  Does report current utilization socially of alcohol.    Social History: Patient was born and raised in [place] [facility] moving to [place] [facility] during high school.  Parents .  1 sister.  High school and college graduate.  Employed as a .  Previous marriage and .  1 daughter age 6.  Denies any incarcerations or previous  service.    Family Psychiatric History:  family history includes Breast cancer in her maternal grandmother; Heart attack in her father; Heart disease in her father; Stroke in her father.    Medical/Surgical History:  Past Medical History:   Diagnosis Date    Acid reflux     Anxiety     Elevated cholesterol     Hypertension     Migraine     Migraines     Urinary tract infection      Past Surgical History:   Procedure Laterality Date     SECTION Bilateral 2016    Procedure:  SECTION PRIMARY;  Surgeon: Valdez Bach DO;  Location: [facility] LABOR DELIVERY;  Service:        Allergies   Allergen Reactions    Ceclor [Cefaclor] Rash     childhood           Current Medications:   Current Outpatient Medications   Medication Sig Dispense Refill    prazosin (MINIPRESS) 1 MG capsule Take 1 capsule by mouth Every Night for 30 days. 30 capsule 0    traZODone (DESYREL) 100 MG tablet Take 1 tablet by mouth Every Night for 14 days. 14 tablet 0    ARIPiprazole (Abilify) 5 MG tablet Take 0.5 tablets by mouth Daily for 4 days, THEN 1 tablet Daily for 10 days. 12 tablet 0     No current  facility-administered medications for this visit.         Review of Systems   Constitutional:  Positive for activity change, appetite change and fatigue.   HENT: Negative.     Eyes: Negative.    Respiratory: Negative.     Cardiovascular: Negative.    Gastrointestinal: Negative.    Endocrine: Negative.    Genitourinary: Negative.    Musculoskeletal: Negative.    Skin: Negative.    Allergic/Immunologic: Negative.    Neurological: Negative.    Hematological: Negative.    Psychiatric/Behavioral:  Positive for decreased concentration, dysphoric mood and sleep disturbance. Negative for self-injury and suicidal ideas. The patient is nervous/anxious.   denies HEENT, cardiovascular, respiratory, liver, renal, GI/, endocrine, neuro, DERM, hematology, immunology, musculoskeletal disorders.    Objective   Physical Exam  Vitals reviewed.   Constitutional:       Appearance: Normal appearance.   HENT:      Head: Normocephalic.      Nose: Nose normal.      Mouth/Throat:      Mouth: Mucous membranes are moist.      Pharynx: Oropharynx is clear.   Eyes:      Extraocular Movements: Extraocular movements intact.      Pupils: Pupils are equal, round, and reactive to light.   Pulmonary:      Effort: Pulmonary effort is normal.   Musculoskeletal:         General: Normal range of motion.      Cervical back: Normal range of motion.   Skin:     General: Skin is warm and dry.   Neurological:      General: No focal deficit present.      Mental Status: She is alert and oriented to person, place, and time.   Psychiatric:         Attention and Perception: Attention and perception normal. She is attentive.         Mood and Affect: Mood and affect normal. Mood is not anxious.         Speech: Speech normal.         Behavior: Behavior normal. Behavior is cooperative.         Thought Content: Thought content normal.         Cognition and Memory: Cognition and memory normal.         Judgment: Judgment normal. Judgment is not impulsive.   not  currently breastfeeding.    Mental Status Exam:   Hygiene:   good  Cooperation:  Cooperative  Eye Contact:  Fair  Psychomotor Behavior:  Appropriate  Affect:   Tearful  Hopelessness: 3  Speech:  Normal and Minimal  Thought Process:  Goal directed and Linear  Thought Content:  Normal and Mood congruent  Suicidal:  None  Homicidal:  None  Hallucinations:  None  Delusion:  None  Memory:  Intact  Orientation:  Person, Place, Time, and Situation  Reliability:  fair  Insight:  Fair  Judgement:  Fair  Impulse Control:  Fair  Physical/Medical Issues:  No       Short-term goals: Patient will be compliant with clinic appointments.  Patient will be engaged in therapy, medication compliant with minimal side effects. Patient  will report decrease of symptoms and frequency.    Long-term goals: Patient will have minimal symptoms of  with continued medication management. Patient will be compliant with treatment and appointments.     Strengths: Motivation for treatment, insight  Weaknesses: Coping skills, support    Prognosis: Guarded dependent on medication/follow up and treatment plan compliance.  Functional Status:  severe impairment in areas of daily functioning.      Assessment & Plan   Diagnoses and all orders for this visit:    1. Severe episode of recurrent major depressive disorder, without psychotic features (Primary)  -     traZODone (DESYREL) 100 MG tablet; Take 1 tablet by mouth Every Night for 14 days.  Dispense: 14 tablet; Refill: 0  -     ARIPiprazole (Abilify) 5 MG tablet; Take 0.5 tablets by mouth Daily for 4 days, THEN 1 tablet Daily for 10 days.  Dispense: 12 tablet; Refill: 0    2. Generalized anxiety disorder  -     traZODone (DESYREL) 100 MG tablet; Take 1 tablet by mouth Every Night for 14 days.  Dispense: 14 tablet; Refill: 0    3. Sleeping difficulty  -     traZODone (DESYREL) 100 MG tablet; Take 1 tablet by mouth Every Night for 14 days.  Dispense: 14 tablet; Refill: 0  -     prazosin (MINIPRESS) 1 MG  capsule; Take 1 capsule by mouth Every Night for 30 days.  Dispense: 30 capsule; Refill: 0    4. Post traumatic stress disorder (PTSD)  -     traZODone (DESYREL) 100 MG tablet; Take 1 tablet by mouth Every Night for 14 days.  Dispense: 14 tablet; Refill: 0  -     prazosin (MINIPRESS) 1 MG capsule; Take 1 capsule by mouth Every Night for 30 days.  Dispense: 30 capsule; Refill: 0      -DC viibryd related to persistent nausea  -Increase trazodone to 100 mg nightly for sleep  -Continue prazosin 1 mg nightly for nightmares.  -Start Abilify 2.5 mg PO daily X4 days then increase to 5 mg PO daily thereafter for mood  -Medications sent to pharmacy at this time    Discussed medication and psychotherapy options.  Patient is to increase trazodone to better target sleep.  Continue prazosin without change for nightmares.  I am starting Abilify for depression and mood stabilization.  Discussed with patient looking into short term disability or FMLA associated with absences.  Patient was instructed on medication side effects, benefits, and also of no treatment.  Patient was given an explanation regarding potential for increased risk of diabetes, lipids, and weight gain.  Labs will be assessed as clinically indicated.  Diet was discussed especially healthy diet choices and increasing activity and exercise.  Patient was strongly urged to continue weight maintance or weight loss efforts.  Patient reported verbalized understanding of instructionsdiscussed the risks, benefits, and side effects of the medication; client acknowledged and verbally consented.  Patient is aware to contact the [facility] with any worsening of symptom.  Patient is agreeable to go to the ER or call 911 should they begin SI/HI.      Return in about 2 weeks (around 9/14/2023), or if symptoms worsen or fail to improve, for Next scheduled follow up.        This document has been electronically signed by KANDY Reyes   September 6, 2023 10:08 EDT      Errors in dictation may reflect use of voice recognition software and not all errors in transcription may have been detected prior to signing.

## 2023-09-06 ENCOUNTER — TELEPHONE (OUTPATIENT)
Dept: FAMILY MEDICINE CLINIC | Facility: CLINIC | Age: 33
End: 2023-09-06
Payer: COMMERCIAL

## 2023-09-07 DIAGNOSIS — F41.1 GENERALIZED ANXIETY DISORDER: ICD-10-CM

## 2023-09-07 DIAGNOSIS — F43.10 POST TRAUMATIC STRESS DISORDER (PTSD): ICD-10-CM

## 2023-09-07 DIAGNOSIS — G47.9 SLEEPING DIFFICULTY: ICD-10-CM

## 2023-09-07 DIAGNOSIS — F33.2 SEVERE EPISODE OF RECURRENT MAJOR DEPRESSIVE DISORDER, WITHOUT PSYCHOTIC FEATURES: ICD-10-CM

## 2023-09-07 RX ORDER — TRAZODONE HYDROCHLORIDE 100 MG/1
100 TABLET ORAL NIGHTLY
Qty: 14 TABLET | Refills: 0 | Status: SHIPPED | OUTPATIENT
Start: 2023-09-07 | End: 2023-09-21

## 2023-09-14 ENCOUNTER — TELEMEDICINE (OUTPATIENT)
Dept: PSYCHIATRY | Facility: CLINIC | Age: 33
End: 2023-09-14
Payer: COMMERCIAL

## 2023-09-14 DIAGNOSIS — F41.1 GENERALIZED ANXIETY DISORDER: ICD-10-CM

## 2023-09-14 DIAGNOSIS — G47.9 SLEEPING DIFFICULTY: ICD-10-CM

## 2023-09-14 DIAGNOSIS — F43.10 POST TRAUMATIC STRESS DISORDER (PTSD): ICD-10-CM

## 2023-09-14 DIAGNOSIS — F33.2 SEVERE EPISODE OF RECURRENT MAJOR DEPRESSIVE DISORDER, WITHOUT PSYCHOTIC FEATURES: ICD-10-CM

## 2023-09-14 PROCEDURE — 99214 OFFICE O/P EST MOD 30 MIN: CPT

## 2023-09-14 RX ORDER — ARIPIPRAZOLE 5 MG/1
5 TABLET ORAL DAILY
Qty: 14 TABLET | Refills: 0 | Status: SHIPPED | OUTPATIENT
Start: 2023-09-14 | End: 2023-09-28

## 2023-09-14 RX ORDER — PRAZOSIN HYDROCHLORIDE 1 MG/1
1 CAPSULE ORAL NIGHTLY
Qty: 30 CAPSULE | Refills: 0 | Status: SHIPPED | OUTPATIENT
Start: 2023-09-14 | End: 2023-10-14

## 2023-09-14 RX ORDER — TRAZODONE HYDROCHLORIDE 100 MG/1
200 TABLET ORAL NIGHTLY
Qty: 60 TABLET | Refills: 0 | Status: SHIPPED | OUTPATIENT
Start: 2023-09-14 | End: 2023-10-14

## 2023-09-14 NOTE — PROGRESS NOTES
Subjective   Dominique De Dios is a 33 y.o. female who is here today for medication management follow up encounter.     “This provider is located at [facility], [address] [place] [address]. The provider identified herself as well as her credentials.   The Patient is at/in home address  by herself/himself, using her/his phone to access video application via [employer]. The patient's condition being diagnosed/treated is appropriate for telemedicine. The patient gave consent to be seen remotely, and when consent is given they understand that the consent allows for patient identifiable information to be sent to a third party as needed.   They may refuse to be seen remotely at any time. The electronic data is encrypted and password protected, and the patient has been advised of the potential risks to privacy not withstanding such measures.”     Start: 1634  Conclusion: 1648    Chief Complaint:  anxiety, depression     HPI:  History of Present Illness    Patient reports that she has tolerated medication changes well without any adverse side effects.  She identifies that she has noticed a positive improvement in depression with the Abilify.  She feels that she is still struggling with motivation, but has noticed an improvement in self-care and enjoyment.  She reports that she is no longer feeling debilitated by her depression and is no longer feeling hopeless or helpless.  She reports that she was accidentally taking 2 of her trazodone and feels that this was more effective for her than one for sleep.  She reports that she was feeling more rested.  With reducing the dose she is averaging about 5 hours per night.  Denies any nightmares with prazosin.  Anxiety is slightly elevated but feels that it may be situational.  She is officially on leave from work temporarily and feels that this has been a stress reducer for her.  She has started psychotherapy services.  Appetite is doing okay.She denies AVH. She  denies SI and HI. She denies SH.     Past Psych History: Diagnosed with anxiety and depression per PCP.  Medication management in the past has been by primary care.  Denies any inpatient psychiatric hospitalizations or outpatient providers.  Denies a history of TBI or seizures.  Denies any knowledge of exposure to illicit substance or toxins while in utero.  Denies any knowledge of  complications.    Previous Psych Meds: Patient reports that in the past she has previously trialed Wellbutrin, Prozac, Lexapro, Celexa, and BuSpar.  Reports that she does feel she has tried other ones but cannot recall them at this time.    Substance Abuse: Patient denies a history of or present illicit substance use, nicotine.  Does report current utilization socially of alcohol.    Social History: Patient was born and raised in [place] moving to [place] during high school.  Parents .  1 sister.  High school and college graduate.  Employed as a .  Previous marriage and .  1 daughter age 6.  Denies any incarcerations or previous  service.    Family Psychiatric History:  family history includes Breast cancer in her maternal grandmother; Heart attack in her father; Heart disease in her father; Stroke in her father.    Medical/Surgical History:  Past Medical History:   Diagnosis Date    Acid reflux     Anxiety     Elevated cholesterol     Hypertension     Migraine     Migraines     Urinary tract infection      Past Surgical History:   Procedure Laterality Date     SECTION Bilateral 2016    Procedure:  SECTION PRIMARY;  Surgeon: Valdez Bach DO;  Location: [facility] LABOR DELIVERY;  Service:        Allergies   Allergen Reactions    Ceclor [Cefaclor] Rash     childhood           Current Medications:   Current Outpatient Medications   Medication Sig Dispense Refill    ARIPiprazole (Abilify) 5 MG tablet Take 1 tablet by mouth Daily for 14  days. 14 tablet 0    prazosin (MINIPRESS) 1 MG capsule Take 1 capsule by mouth Every Night for 30 days. 30 capsule 0    traZODone (DESYREL) 100 MG tablet Take 2 tablets by mouth Every Night for 30 days. 60 tablet 0     No current facility-administered medications for this visit.         Review of Systems   Constitutional:  Positive for activity change, appetite change and fatigue.   HENT: Negative.     Eyes: Negative.    Respiratory: Negative.     Cardiovascular: Negative.    Gastrointestinal: Negative.    Endocrine: Negative.    Genitourinary: Negative.    Musculoskeletal: Negative.    Skin: Negative.    Allergic/Immunologic: Negative.    Neurological: Negative.    Hematological: Negative.    Psychiatric/Behavioral:  Positive for decreased concentration, dysphoric mood and sleep disturbance. Negative for self-injury and suicidal ideas. The patient is nervous/anxious.   denies HEENT, cardiovascular, respiratory, liver, renal, GI/, endocrine, neuro, DERM, hematology, immunology, musculoskeletal disorders.    Objective   Physical Exam  Vitals reviewed.   Constitutional:       Appearance: Normal appearance.   HENT:      Head: Normocephalic.      Nose: Nose normal.      Mouth/Throat:      Mouth: Mucous membranes are moist.      Pharynx: Oropharynx is clear.   Eyes:      Extraocular Movements: Extraocular movements intact.      Pupils: Pupils are equal, round, and reactive to light.   Pulmonary:      Effort: Pulmonary effort is normal.   Musculoskeletal:         General: Normal range of motion.      Cervical back: Normal range of motion.   Skin:     General: Skin is warm and dry.   Neurological:      General: No focal deficit present.      Mental Status: She is alert and oriented to person, place, and time.   Psychiatric:         Attention and Perception: Attention and perception normal. She is attentive.         Mood and Affect: Mood and affect normal. Mood is not anxious.         Speech: Speech normal.          Behavior: Behavior normal. Behavior is cooperative.         Thought Content: Thought content normal.         Cognition and Memory: Cognition and memory normal.         Judgment: Judgment normal. Judgment is not impulsive.   not currently breastfeeding.    Mental Status Exam:   Hygiene:   good  Cooperation:  Cooperative  Eye Contact:  Fair  Psychomotor Behavior:  Appropriate  Affect:   Tearful  Hopelessness: 3  Speech:  Normal and Minimal  Thought Process:  Goal directed and Linear  Thought Content:  Normal and Mood congruent  Suicidal:  None  Homicidal:  None  Hallucinations:  None  Delusion:  None  Memory:  Intact  Orientation:  Person, Place, Time, and Situation  Reliability:  fair  Insight:  Fair  Judgement:  Fair  Impulse Control:  Fair  Physical/Medical Issues:  No       Short-term goals: Patient will be compliant with clinic appointments.  Patient will be engaged in therapy, medication compliant with minimal side effects. Patient  will report decrease of symptoms and frequency.    Long-term goals: Patient will have minimal symptoms of  with continued medication management. Patient will be compliant with treatment and appointments.     Strengths: Motivation for treatment, insight  Weaknesses: Coping skills, support    Prognosis: Guarded dependent on medication/follow up and treatment plan compliance.  Functional Status:  severe impairment in areas of daily functioning.      Assessment & Plan   Diagnoses and all orders for this visit:    1. Generalized anxiety disorder  -     traZODone (DESYREL) 100 MG tablet; Take 2 tablets by mouth Every Night for 30 days.  Dispense: 60 tablet; Refill: 0    2. Sleeping difficulty  -     traZODone (DESYREL) 100 MG tablet; Take 2 tablets by mouth Every Night for 30 days.  Dispense: 60 tablet; Refill: 0  -     prazosin (MINIPRESS) 1 MG capsule; Take 1 capsule by mouth Every Night for 30 days.  Dispense: 30 capsule; Refill: 0    3. Post traumatic stress disorder (PTSD)  -      traZODone (DESYREL) 100 MG tablet; Take 2 tablets by mouth Every Night for 30 days.  Dispense: 60 tablet; Refill: 0  -     prazosin (MINIPRESS) 1 MG capsule; Take 1 capsule by mouth Every Night for 30 days.  Dispense: 30 capsule; Refill: 0    4. Severe episode of recurrent major depressive disorder, without psychotic features  -     traZODone (DESYREL) 100 MG tablet; Take 2 tablets by mouth Every Night for 30 days.  Dispense: 60 tablet; Refill: 0  -     ARIPiprazole (Abilify) 5 MG tablet; Take 1 tablet by mouth Daily for 14 days.  Dispense: 14 tablet; Refill: 0      -Increase trazodone to 200 mg nightly for sleep  -Continue prazosin 1 mg nightly for nightmares.  -Continue Abilify 5 mg PO daily  for mood  -Labs ordered: CBC, CMP, iron panel, TSH with reflex T4, lipid panel, hemoglobin A1c, vitamin D, vitamin B12.  -Medications sent to pharmacy at this time    Discussed medication and psychotherapy options.  Patient is to increase trazodone to better target sleep.  Continue prazosin and Abilify without change.  Reiterated with patient side effects of each medication individually as well as in combination.  Abs ordered to rule out organic influence. Patient reported verbalized understanding of instructionsdiscussed the risks, benefits, and side effects of the medication; client acknowledged and verbally consented.  Patient is aware to contact the [facility] with any worsening of symptom.  Patient is agreeable to go to the ER or call 911 should they begin SI/HI.      Return in about 2 weeks (around 9/28/2023), or if symptoms worsen or fail to improve, for Next scheduled follow up.        This document has been electronically signed by KANDY Reyes   September 14, 2023 17:03 EDT     Errors in dictation may reflect use of voice recognition software and not all errors in transcription may have been detected prior to signing.

## 2023-09-18 ENCOUNTER — LAB (OUTPATIENT)
Dept: FAMILY MEDICINE CLINIC | Facility: CLINIC | Age: 33
End: 2023-09-18
Payer: COMMERCIAL

## 2023-09-18 DIAGNOSIS — F41.1 GENERALIZED ANXIETY DISORDER: ICD-10-CM

## 2023-09-18 DIAGNOSIS — G47.9 SLEEPING DIFFICULTY: ICD-10-CM

## 2023-09-18 DIAGNOSIS — F33.2 SEVERE EPISODE OF RECURRENT MAJOR DEPRESSIVE DISORDER, WITHOUT PSYCHOTIC FEATURES: ICD-10-CM

## 2023-09-18 LAB
25(OH)D3 SERPL-MCNC: 38.6 NG/ML (ref 30–100)
ALBUMIN SERPL-MCNC: 4.1 G/DL (ref 3.5–5.2)
ALBUMIN/GLOB SERPL: 1.9 G/DL
ALP SERPL-CCNC: 51 U/L (ref 39–117)
ALT SERPL W P-5'-P-CCNC: 61 U/L (ref 1–33)
ANION GAP SERPL CALCULATED.3IONS-SCNC: 11 MMOL/L (ref 5–15)
AST SERPL-CCNC: 49 U/L (ref 1–32)
BASOPHILS # BLD AUTO: 0.03 10*3/MM3 (ref 0–0.2)
BASOPHILS NFR BLD AUTO: 0.5 % (ref 0–1.5)
BILIRUB SERPL-MCNC: 0.2 MG/DL (ref 0–1.2)
BUN SERPL-MCNC: 8 MG/DL (ref 6–20)
BUN/CREAT SERPL: 8.3 (ref 7–25)
CALCIUM SPEC-SCNC: 9.2 MG/DL (ref 8.6–10.5)
CHLORIDE SERPL-SCNC: 103 MMOL/L (ref 98–107)
CHOLEST SERPL-MCNC: 231 MG/DL (ref 0–200)
CO2 SERPL-SCNC: 24 MMOL/L (ref 22–29)
CREAT SERPL-MCNC: 0.96 MG/DL (ref 0.57–1)
DEPRECATED RDW RBC AUTO: 40.6 FL (ref 37–54)
EGFRCR SERPLBLD CKD-EPI 2021: 80.3 ML/MIN/1.73
EOSINOPHIL # BLD AUTO: 0.16 10*3/MM3 (ref 0–0.4)
EOSINOPHIL NFR BLD AUTO: 2.7 % (ref 0.3–6.2)
ERYTHROCYTE [DISTWIDTH] IN BLOOD BY AUTOMATED COUNT: 11.7 % (ref 12.3–15.4)
GLOBULIN UR ELPH-MCNC: 2.2 GM/DL
GLUCOSE SERPL-MCNC: 99 MG/DL (ref 65–99)
HBA1C MFR BLD: 4.7 % (ref 4.8–5.6)
HCT VFR BLD AUTO: 40.4 % (ref 34–46.6)
HDLC SERPL-MCNC: 64 MG/DL (ref 40–60)
HGB BLD-MCNC: 13.9 G/DL (ref 12–15.9)
IMM GRANULOCYTES # BLD AUTO: 0.01 10*3/MM3 (ref 0–0.05)
IMM GRANULOCYTES NFR BLD AUTO: 0.2 % (ref 0–0.5)
IRON 24H UR-MRATE: 90 MCG/DL (ref 37–145)
IRON SATN MFR SERPL: 22 % (ref 20–50)
LDLC SERPL CALC-MCNC: 148 MG/DL (ref 0–100)
LDLC/HDLC SERPL: 2.27 {RATIO}
LYMPHOCYTES # BLD AUTO: 1.45 10*3/MM3 (ref 0.7–3.1)
LYMPHOCYTES NFR BLD AUTO: 24.2 % (ref 19.6–45.3)
MCH RBC QN AUTO: 32.9 PG (ref 26.6–33)
MCHC RBC AUTO-ENTMCNC: 34.4 G/DL (ref 31.5–35.7)
MCV RBC AUTO: 95.7 FL (ref 79–97)
MONOCYTES # BLD AUTO: 0.33 10*3/MM3 (ref 0.1–0.9)
MONOCYTES NFR BLD AUTO: 5.5 % (ref 5–12)
NEUTROPHILS NFR BLD AUTO: 4.02 10*3/MM3 (ref 1.7–7)
NEUTROPHILS NFR BLD AUTO: 66.9 % (ref 42.7–76)
NRBC BLD AUTO-RTO: 0.2 /100 WBC (ref 0–0.2)
PLATELET # BLD AUTO: 274 10*3/MM3 (ref 140–450)
PMV BLD AUTO: 9.8 FL (ref 6–12)
POTASSIUM SERPL-SCNC: 4.5 MMOL/L (ref 3.5–5.2)
PROT SERPL-MCNC: 6.3 G/DL (ref 6–8.5)
RBC # BLD AUTO: 4.22 10*6/MM3 (ref 3.77–5.28)
SODIUM SERPL-SCNC: 138 MMOL/L (ref 136–145)
TIBC SERPL-MCNC: 404 MCG/DL (ref 298–536)
TRANSFERRIN SERPL-MCNC: 271 MG/DL (ref 200–360)
TRIGL SERPL-MCNC: 108 MG/DL (ref 0–150)
TSH SERPL DL<=0.05 MIU/L-ACNC: 1.49 UIU/ML (ref 0.27–4.2)
VIT B12 BLD-MCNC: 374 PG/ML (ref 211–946)
VLDLC SERPL-MCNC: 19 MG/DL (ref 5–40)
WBC NRBC COR # BLD: 6 10*3/MM3 (ref 3.4–10.8)

## 2023-09-18 PROCEDURE — 36415 COLL VENOUS BLD VENIPUNCTURE: CPT

## 2023-09-18 PROCEDURE — 82306 VITAMIN D 25 HYDROXY: CPT

## 2023-09-18 PROCEDURE — 84466 ASSAY OF TRANSFERRIN: CPT

## 2023-09-18 PROCEDURE — 80061 LIPID PANEL: CPT

## 2023-09-18 PROCEDURE — 80050 GENERAL HEALTH PANEL: CPT

## 2023-09-18 PROCEDURE — 82607 VITAMIN B-12: CPT

## 2023-09-18 PROCEDURE — 83540 ASSAY OF IRON: CPT

## 2023-09-18 PROCEDURE — 83036 HEMOGLOBIN GLYCOSYLATED A1C: CPT

## 2023-09-19 ENCOUNTER — TELEPHONE (OUTPATIENT)
Dept: FAMILY MEDICINE CLINIC | Facility: CLINIC | Age: 33
End: 2023-09-19
Payer: COMMERCIAL

## 2023-09-19 NOTE — TELEPHONE ENCOUNTER
----- Message from KANDY Reyes sent at 9/19/2023 10:58 AM EDT -----  Please call patient and let her know that her liver functions were slightly elevated.  This could be a result of a multitude of different factors.  I want her to schedule with her PCP for further workup and recommendation.  Lipid panel was elevated, I recommend a heart healthy diet.  Other than that overall her labs look well.  ----- Message -----  From: Lab, Background User  Sent: 9/18/2023   6:42 PM EDT  To: KANDY Reyes

## 2023-09-19 NOTE — TELEPHONE ENCOUNTER
Dominique called regarding her abnormal labs and would like for you to call her and discuss what she needs to do    Please advise

## 2023-09-21 ENCOUNTER — TRANSCRIBE ORDERS (OUTPATIENT)
Dept: ADMINISTRATIVE | Facility: HOSPITAL | Age: 33
End: 2023-09-21

## 2023-09-21 DIAGNOSIS — R10.9 ABDOMINAL PAIN, UNSPECIFIED ABDOMINAL LOCATION: Primary | ICD-10-CM

## 2023-10-02 ENCOUNTER — TELEMEDICINE (OUTPATIENT)
Dept: PSYCHIATRY | Facility: CLINIC | Age: 33
End: 2023-10-02
Payer: COMMERCIAL

## 2023-10-02 DIAGNOSIS — G47.9 SLEEPING DIFFICULTY: ICD-10-CM

## 2023-10-02 DIAGNOSIS — F43.10 POST TRAUMATIC STRESS DISORDER (PTSD): ICD-10-CM

## 2023-10-02 DIAGNOSIS — F33.2 SEVERE EPISODE OF RECURRENT MAJOR DEPRESSIVE DISORDER, WITHOUT PSYCHOTIC FEATURES: ICD-10-CM

## 2023-10-02 DIAGNOSIS — F41.1 GENERALIZED ANXIETY DISORDER: ICD-10-CM

## 2023-10-02 PROCEDURE — 99214 OFFICE O/P EST MOD 30 MIN: CPT

## 2023-10-02 NOTE — PROGRESS NOTES
"Subjective   Dominique De Dios is a 33 y.o. female who is here today for medication management follow up encounter.     “This provider is located at [facility], [address] [place] [address]. The provider identified herself as well as her credentials.   The Patient is at/in home address  by herself/himself, using her/his phone to access video application via [employer]. The patient's condition being diagnosed/treated is appropriate for telemedicine. The patient gave consent to be seen remotely, and when consent is given they understand that the consent allows for patient identifiable information to be sent to a third party as needed.   They may refuse to be seen remotely at any time. The electronic data is encrypted and password protected, and the patient has been advised of the potential risks to privacy not withstanding such measures.”     Start: 1530  Conclusion: 1543    Chief Complaint:  anxiety, depression     HPI:  History of Present Illness    Patient reports continued improvement in mood.  Reports that depression is still there, but feels that she is able to \"work past it.\"  She reports \"this is the best I felt with anxiety and depression in a long time.\"  She reports that irritability has reduced.  Anxiety has been situational.  She reports that she has been able to take this time off of work to take care of herself both physically and mentally and she feels that this has been also very positive for her.  Sleep is doing okay at this time.  Feels rested.  6 to 7 hours per night.  Nightmares are well controlled with current regimen.  Continues with therapy services reports no change in appetite.She denies AVH. She denies SI and HI. She denies SH.     Past Psych History: Diagnosed with anxiety and depression per PCP.  Medication management in the past has been by primary care.  Denies any inpatient psychiatric hospitalizations or outpatient providers.  Denies a history of TBI or seizures.  " Denies any knowledge of exposure to illicit substance or toxins while in utero.  Denies any knowledge of  complications.    Previous Psych Meds: Patient reports that in the past she has previously trialed Wellbutrin, Prozac, Lexapro, Celexa, and BuSpar.  Reports that she does feel she has tried other ones but cannot recall them at this time.    Substance Abuse: Patient denies a history of or present illicit substance use, nicotine.  Does report current utilization socially of alcohol.    Social History: Patient was born and raised in [place] moving to [place] during high school.  Parents .  1 sister.  High school and college graduate.  Employed as a .  Previous marriage and .  1 daughter age 6.  Denies any incarcerations or previous  service.    Family Psychiatric History:  family history includes Breast cancer in her maternal grandmother; Heart attack in her father; Heart disease in her father; Stroke in her father.    Medical/Surgical History:  Past Medical History:   Diagnosis Date    Acid reflux     Anxiety     Elevated cholesterol     Hypertension     Migraine     Migraines     Urinary tract infection      Past Surgical History:   Procedure Laterality Date     SECTION Bilateral 2016    Procedure:  SECTION PRIMARY;  Surgeon: Valdez Bach DO;  Location: [facility] LABOR DELIVERY;  Service:        Allergies   Allergen Reactions    Ceclor [Cefaclor] Rash     childhood           Current Medications:   Current Outpatient Medications   Medication Sig Dispense Refill    prazosin (MINIPRESS) 1 MG capsule Take 1 capsule by mouth Every Night for 60 days. 30 capsule 1    traZODone (DESYREL) 100 MG tablet Take 2 tablets by mouth Every Night for 60 days. 60 tablet 1    ARIPiprazole (Abilify) 5 MG tablet Take 1 tablet by mouth Daily. 30 tablet 1    pantoprazole (PROTONIX) 40 MG EC tablet 1 tablet daily in the evening       pravastatin (PRAVACHOL) 20 MG tablet Take 1 tablet every day by oral route in the evening.       No current facility-administered medications for this visit.         Review of Systems   Constitutional:  Negative for activity change, appetite change and fatigue.   HENT: Negative.     Eyes: Negative.    Respiratory: Negative.     Cardiovascular: Negative.    Gastrointestinal: Negative.    Endocrine: Negative.    Genitourinary: Negative.    Musculoskeletal: Negative.    Skin: Negative.    Allergic/Immunologic: Negative.    Neurological: Negative.    Hematological: Negative.    Psychiatric/Behavioral:  Negative for decreased concentration, dysphoric mood, self-injury, sleep disturbance and suicidal ideas. The patient is not nervous/anxious.   denies HEENT, cardiovascular, respiratory, liver, renal, GI/, endocrine, neuro, DERM, hematology, immunology, musculoskeletal disorders.    Objective   Physical Exam  Vitals reviewed.   Constitutional:       Appearance: Normal appearance.   HENT:      Head: Normocephalic.      Nose: Nose normal.      Mouth/Throat:      Mouth: Mucous membranes are moist.      Pharynx: Oropharynx is clear.   Eyes:      Extraocular Movements: Extraocular movements intact.      Pupils: Pupils are equal, round, and reactive to light.   Pulmonary:      Effort: Pulmonary effort is normal.   Musculoskeletal:         General: Normal range of motion.      Cervical back: Normal range of motion.   Skin:     General: Skin is warm and dry.   Neurological:      General: No focal deficit present.      Mental Status: She is alert and oriented to person, place, and time.   Psychiatric:         Attention and Perception: Attention and perception normal. She is attentive.         Mood and Affect: Mood and affect normal. Mood is not anxious.         Speech: Speech normal.         Behavior: Behavior normal. Behavior is cooperative.         Thought Content: Thought content normal.         Cognition and Memory: Cognition  and memory normal.         Judgment: Judgment normal. Judgment is not impulsive.   not currently breastfeeding.    Mental Status Exam:   Hygiene:   good  Cooperation:  Cooperative  Eye Contact:  Fair  Psychomotor Behavior:  Appropriate  Affect:  Full range and Appropriate  Hopelessness: Optimistic  Speech:  Normal  Thought Process:  Goal directed and Linear  Thought Content:  Normal and Mood congruent  Suicidal:  None  Homicidal:  None  Hallucinations:  None  Delusion:  None  Memory:  Intact  Orientation:  Person, Place, Time, and Situation  Reliability:  fair  Insight:  Fair  Judgement:  Fair  Impulse Control:  Fair  Physical/Medical Issues:  No       Short-term goals: Patient will be compliant with clinic appointments.  Patient will be engaged in therapy, medication compliant with minimal side effects. Patient  will report decrease of symptoms and frequency.    Long-term goals: Patient will have minimal symptoms of  with continued medication management. Patient will be compliant with treatment and appointments.     Strengths: Motivation for treatment, insight  Weaknesses: Coping skills, support    Prognosis: Guarded dependent on medication/follow up and treatment plan compliance.  Functional Status:  severe impairment in areas of daily functioning.      Assessment & Plan   Diagnoses and all orders for this visit:    1. Generalized anxiety disorder  -     traZODone (DESYREL) 100 MG tablet; Take 2 tablets by mouth Every Night for 60 days.  Dispense: 60 tablet; Refill: 1    2. Sleeping difficulty  -     traZODone (DESYREL) 100 MG tablet; Take 2 tablets by mouth Every Night for 60 days.  Dispense: 60 tablet; Refill: 1  -     prazosin (MINIPRESS) 1 MG capsule; Take 1 capsule by mouth Every Night for 60 days.  Dispense: 30 capsule; Refill: 1    3. Post traumatic stress disorder (PTSD)  -     traZODone (DESYREL) 100 MG tablet; Take 2 tablets by mouth Every Night for 60 days.  Dispense: 60 tablet; Refill: 1  -      prazosin (MINIPRESS) 1 MG capsule; Take 1 capsule by mouth Every Night for 60 days.  Dispense: 30 capsule; Refill: 1    4. Severe episode of recurrent major depressive disorder, without psychotic features  -     traZODone (DESYREL) 100 MG tablet; Take 2 tablets by mouth Every Night for 60 days.  Dispense: 60 tablet; Refill: 1  -     ARIPiprazole (Abilify) 5 MG tablet; Take 1 tablet by mouth Daily.  Dispense: 30 tablet; Refill: 1        -Continue trazodone 200 mg nightly for sleep  -Continue prazosin 1 mg nightly for nightmares.  -Continue Abilify 5 mg PO daily  for mood  -Labs reviewed: Lipid panel elevated, for functions elevated.  Repeat labs in 3 months.  -Medications sent to pharmacy at this time    Discussed medication and psychotherapy options.  At this time patient is to continue current regimen without change.  Trazodone for sleep.  Prazosin for nightmares.  Abilify for mood.  Lipid panel was elevated, recommended a heart healthy diet.  Patient was started on cholesterol medication per PCP.  Liver enzymes elevated, patient followed up with PCP and has an upcoming ultrasound scheduled. Reiterated with patient side effects of each medication individually as well as in combination.Patient reported verbalized understanding of instructionsdiscussed the risks, benefits, and side effects of the medication; client acknowledged and verbally consented.  Patient is aware to contact the [facility] with any worsening of symptom.  Patient is agreeable to go to the ER or call 911 should they begin SI/HI.      Return in about 4 weeks (around 10/30/2023), or if symptoms worsen or fail to improve, for Next scheduled follow up.        This document has been electronically signed by KANDY Reyes   October 3, 2023 08:27 EDT     Errors in dictation may reflect use of voice recognition software and not all errors in transcription may have been detected prior to signing.

## 2023-10-03 RX ORDER — PANTOPRAZOLE SODIUM 40 MG/1
TABLET, DELAYED RELEASE ORAL
COMMUNITY

## 2023-10-03 RX ORDER — PRAVASTATIN SODIUM 20 MG
TABLET ORAL
COMMUNITY

## 2023-10-03 RX ORDER — PRAZOSIN HYDROCHLORIDE 1 MG/1
1 CAPSULE ORAL NIGHTLY
Qty: 30 CAPSULE | Refills: 1 | Status: SHIPPED | OUTPATIENT
Start: 2023-10-03 | End: 2023-12-02

## 2023-10-03 RX ORDER — ARIPIPRAZOLE 5 MG/1
5 TABLET ORAL DAILY
Qty: 30 TABLET | Refills: 1 | Status: SHIPPED | OUTPATIENT
Start: 2023-10-03

## 2023-10-03 RX ORDER — TRAZODONE HYDROCHLORIDE 100 MG/1
200 TABLET ORAL NIGHTLY
Qty: 60 TABLET | Refills: 1 | Status: SHIPPED | OUTPATIENT
Start: 2023-10-03 | End: 2023-12-02

## 2023-10-12 ENCOUNTER — HOSPITAL ENCOUNTER (OUTPATIENT)
Dept: ULTRASOUND IMAGING | Facility: HOSPITAL | Age: 33
Discharge: HOME | End: 2023-10-12
Admitting: NURSE PRACTITIONER
Payer: COMMERCIAL

## 2023-10-12 DIAGNOSIS — R10.9 ABDOMINAL PAIN, UNSPECIFIED ABDOMINAL LOCATION: ICD-10-CM

## 2023-10-12 PROCEDURE — 76700 US EXAM ABDOM COMPLETE: CPT

## 2023-10-18 NOTE — TELEPHONE ENCOUNTER
"Encounter Date: 10/18/2023       History     Chief Complaint   Patient presents with    Shoulder Pain     L shoulder pain x4 days. Injury to shoulder 2016.    Abdominal Pain     X3 months. States bulging present in naval area. Denies N/V. + diarrhea.      Pt is a 37 y.o. male who presents to the [facility] ED complaining of Lt shoulder pain x 4 days. Reports previous injury to affected area in the past with flares of pain since. Denies chest pain, SOB, weakness, dizziness, abdominal pain, or loss of sensation/mobility in affected joint. Pt uncertain if he injured area while at work. Pt also reports a small "bulge" in his belly button which has been ongoing for "a long time." Reports being able to "push in" area without difficulty but it returns with physical activity.      Review of patient's allergies indicates:  No Known Allergies  No past medical history on file.  No past surgical history on file.  No family history on file.  Social History     Tobacco Use    Smoking status: Never    Smokeless tobacco: Never     Review of Systems   Constitutional:  Negative for chills, diaphoresis, fatigue and fever.   HENT:  Negative for facial swelling, postnasal drip, rhinorrhea, sinus pressure, sinus pain, sore throat and trouble swallowing.    Respiratory:  Negative for cough, chest tightness, shortness of breath and wheezing.    Cardiovascular:  Negative for chest pain, palpitations and leg swelling.   Gastrointestinal:  Negative for abdominal pain, diarrhea, nausea and vomiting.   Genitourinary:  Negative for dysuria, flank pain, hematuria and urgency.   Musculoskeletal:  Positive for arthralgias. Negative for back pain and myalgias.   Skin:  Negative for color change and rash.   Neurological:  Negative for dizziness, syncope, weakness and headaches.   Hematological:  Does not bruise/bleed easily.   All other systems reviewed and are negative.      Physical Exam     Initial Vitals [10/18/23 1728]   BP Pulse Resp Temp SpO2   (!) " Hub can read.     Called pt to RS her apt due to Jelena not going to be here. No answer LM.    141/87 60 18 98.7 °F (37.1 °C) 99 %      MAP       --         Physical Exam    Nursing note and vitals reviewed.  Constitutional: Vital signs are normal. He appears well-developed and well-nourished.   HENT:   Head: Normocephalic.   Nose: Nose normal.   Mouth/Throat: Oropharynx is clear and moist.   Eyes: Conjunctivae and EOM are normal. Pupils are equal, round, and reactive to light.   Neck: Neck supple.   Normal range of motion.  Cardiovascular:  Normal rate, regular rhythm, normal heart sounds and intact distal pulses.           Pulmonary/Chest: Effort normal and breath sounds normal. No respiratory distress. He has no wheezes. He has no rhonchi. He has no rales. He exhibits no tenderness.   Abdominal: Abdomen is soft and flat. Bowel sounds are normal. There is no abdominal tenderness. A hernia is present. Hernia confirmed positive in the umbilical area (Reducible). There is no rebound, no guarding, no tenderness at McBurney's point and negative Kay's sign.   Musculoskeletal:         General: Normal range of motion.      Left shoulder: Tenderness present. No swelling. Normal range of motion. Normal strength. Normal pulse.        Arms:       Cervical back: Normal range of motion and neck supple.     Neurological: He is alert and oriented to person, place, and time. He has normal strength.   Skin: Skin is warm and dry. Capillary refill takes less than 2 seconds.   Psychiatric: He has a normal mood and affect. His behavior is normal. Judgment and thought content normal.         ED Course   Procedures  Labs Reviewed - No data to display       Imaging Results              X-Ray Shoulder 2 or More Views Left (Final result)  Result time 10/18/23 17:59:23      Final result by Gamaliel Farnsworth MD (10/18/23 17:59:23)                   Impression:      No acute osseous abnormality identified.      Electronically signed by: Gamaliel Farnsworth  Date:    10/18/2023  Time:    17:59               Narrative:    EXAMINATION:  XR  SHOULDER COMPLETE 2 OR MORE VIEWS LEFT    CLINICAL HISTORY:  left shoulder pain;    TECHNIQUE:  Three-view    COMPARISON:  July 18, 2004.    FINDINGS:  Articular and osseous structures are unremarkable.  There is no acute fracture, dislocation or osteoarthritic change.  Alignment and position is unremarkable.  There is unremarkable demineralization of the bones.  No soft tissue calcifications identified.                                       Medications   ketorolac tablet 10 mg (10 mg Oral Given 10/18/23 1356)     Medical Decision Making  Differential:  Umbilical hernia  Left shoulder strain  Arthritis  Fracture    Amount and/or Complexity of Data Reviewed  Radiology: ordered.    Risk  Prescription drug management.               ED Course as of 10/18/23 1810   Wed Oct 18, 2023   1808 6:08 PM Reassessed patient at this time. Reports condition has improved. Discussed with patient all pertinent ED information and results. Discussed diagnosis and treatment plan with patient. Follow up instructions and return to ED instruction have been given. All questions and concerns were addressed at this time. Patient voices understanding of information and instructions. Patient is comfortable with plan and discharge. Patient is stable for discharge.    [JA]      ED Course User Index  [JA] Roberto Kearney Jr., FNP                    Clinical Impression:   Final diagnoses:  [M25.512] Left shoulder pain (Primary)  [K42.9] Umbilical hernia without obstruction and without gangrene        ED Disposition Condition    Discharge Stable          ED Prescriptions       Medication Sig Dispense Start Date End Date Auth. Provider    diclofenac (VOLTAREN) 75 MG EC tablet Take 1 tablet (75 mg total) by mouth 2 (two) times daily. for 7 days 14 tablet 10/18/2023 10/25/2023 Roberto Kearney Jr., FNP    baclofen (LIORESAL) 10 MG tablet Take 1 tablet (10 mg total) by mouth 3 (three) times daily as needed (muscle spasms). 21 tablet 10/18/2023 --  Roberto Kearney Jr., ISSAP          Follow-up Information       Follow up With Specialties Details Why Contact Info    [facility] Emergency Medicine In 3 days As needed, If symptoms worsen [address] [facility] [id number] [phone]    [facility]  Schedule an appointment as soon as possible for a visit in 2 weeks Follow up with [facility] in next 1-2 weeks for evalaution. [address] [facility] [address] [facility] [id number]             Roberto Kearney Jr., ISSAP  10/18/23 9313

## 2023-11-02 ENCOUNTER — TELEMEDICINE (OUTPATIENT)
Dept: PSYCHIATRY | Facility: CLINIC | Age: 33
End: 2023-11-02
Payer: COMMERCIAL

## 2023-11-02 DIAGNOSIS — F33.2 SEVERE EPISODE OF RECURRENT MAJOR DEPRESSIVE DISORDER, WITHOUT PSYCHOTIC FEATURES: Primary | ICD-10-CM

## 2023-11-02 DIAGNOSIS — F41.1 GENERALIZED ANXIETY DISORDER: ICD-10-CM

## 2023-11-02 DIAGNOSIS — G47.9 SLEEPING DIFFICULTY: ICD-10-CM

## 2023-11-02 DIAGNOSIS — F43.10 POST TRAUMATIC STRESS DISORDER (PTSD): ICD-10-CM

## 2023-11-02 PROCEDURE — 99214 OFFICE O/P EST MOD 30 MIN: CPT

## 2023-11-02 RX ORDER — TRAZODONE HYDROCHLORIDE 100 MG/1
200 TABLET ORAL NIGHTLY
Qty: 60 TABLET | Refills: 1 | Status: SHIPPED | OUTPATIENT
Start: 2023-11-02 | End: 2024-01-01

## 2023-11-02 RX ORDER — ARIPIPRAZOLE 5 MG/1
5 TABLET ORAL DAILY
Qty: 30 TABLET | Refills: 1 | Status: SHIPPED | OUTPATIENT
Start: 2023-11-02

## 2023-11-02 RX ORDER — PRAZOSIN HYDROCHLORIDE 1 MG/1
1 CAPSULE ORAL NIGHTLY
Qty: 30 CAPSULE | Refills: 1 | Status: SHIPPED | OUTPATIENT
Start: 2023-11-02 | End: 2024-01-01

## 2023-11-02 NOTE — PROGRESS NOTES
"Subjective   Dominique De Dios is a 33 y.o. female who is here today for medication management follow up encounter.     “This provider is located at [facility], [address] [place] [address]. The provider identified herself as well as her credentials.   The Patient is at/in home address  by herself/himself, using her/his phone to access video application via [employer]. The patient's condition being diagnosed/treated is appropriate for telemedicine. The patient gave consent to be seen remotely, and when consent is given they understand that the consent allows for patient identifiable information to be sent to a third party as needed.   They may refuse to be seen remotely at any time. The electronic data is encrypted and password protected, and the patient has been advised of the potential risks to privacy not withstanding such measures.”     Start: 1428  Conclusion:1436    Chief Complaint:  anxiety, depression     HPI:  History of Present Illness    Patient reports that overall mood has continued to be stable except for the last 2 weeks she has felt more depressed.  She reports \"I have like I have either been getting sick or this is my seasonal depression.\"  She reports that she has had a reduction in motivation, reduction in energy, fatigue, intermittent feelings of sadness.  Irritability has been nonproblematic.  Anxiety has been elevated.  She reports that she has not had any panic attacks but, \"feels like I am just waiting for something else to happen.\"  Reports that sleep is became more difficult at night, 5 to 6 hours with intermittent awakenings.  No nightmares on current regimen.  Continues with therapy services.  Has returned back to work.  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 She denies AVH. She denies SI and HI. She denies SH.     Past Psych History: Diagnosed with anxiety and depression per PCP.  Medication management " in the past has been by primary care.  Denies any inpatient psychiatric hospitalizations or outpatient providers.  Denies a history of TBI or seizures.  Denies any knowledge of exposure to illicit substance or toxins while in utero.  Denies any knowledge of  complications.    Previous Psych Meds: Patient reports that in the past she has previously trialed Wellbutrin, Prozac, Lexapro, Celexa, and BuSpar.  Reports that she does feel she has tried other ones but cannot recall them at this time.    Substance Abuse: Patient denies a history of or present illicit substance use, nicotine.  Does report current utilization socially of alcohol.    Social History: Patient was born and raised in [place] moving to [place] during high school.  Parents .  1 sister.  High school and college graduate.  Employed as a .  Previous marriage and .  1 daughter age 6.  Denies any incarcerations or previous  service.    Family Psychiatric History:  family history includes Breast cancer in her maternal grandmother; Heart attack in her father; Heart disease in her father; Stroke in her father.    Medical/Surgical History:  Past Medical History:   Diagnosis Date    Acid reflux     Anxiety     Elevated cholesterol     Hypertension     Migraine     Migraines     Urinary tract infection      Past Surgical History:   Procedure Laterality Date     SECTION Bilateral 2016    Procedure:  SECTION PRIMARY;  Surgeon: Valdez Bach DO;  Location: [facility] LABOR DELIVERY;  Service:        Allergies   Allergen Reactions    Ceclor [Cefaclor] Rash     childhood           Current Medications:   Current Outpatient Medications   Medication Sig Dispense Refill    ARIPiprazole (Abilify) 5 MG tablet Take 1 tablet by mouth Daily. 30 tablet 1    prazosin (MINIPRESS) 1 MG capsule Take 1 capsule by mouth Every Night for 60 days. 30 capsule 1    traZODone (DESYREL)  100 MG tablet Take 2 tablets by mouth Every Night for 60 days. 60 tablet 1    pantoprazole (PROTONIX) 40 MG EC tablet 1 tablet daily in the evening      pravastatin (PRAVACHOL) 20 MG tablet Take 1 tablet every day by oral route in the evening.       No current facility-administered medications for this visit.         Review of Systems   Constitutional:  Negative for activity change, appetite change and fatigue.   HENT: Negative.     Eyes: Negative.    Respiratory: Negative.     Cardiovascular: Negative.    Gastrointestinal: Negative.    Endocrine: Negative.    Genitourinary: Negative.    Musculoskeletal: Negative.    Skin: Negative.    Allergic/Immunologic: Negative.    Neurological: Negative.    Hematological: Negative.    Psychiatric/Behavioral:  Negative for decreased concentration, dysphoric mood, self-injury, sleep disturbance and suicidal ideas. The patient is not nervous/anxious.     denies HEENT, cardiovascular, respiratory, liver, renal, GI/, endocrine, neuro, DERM, hematology, immunology, musculoskeletal disorders.    Objective   Physical Exam  Vitals reviewed.   Constitutional:       Appearance: Normal appearance.   HENT:      Head: Normocephalic.      Nose: Nose normal.      Mouth/Throat:      Mouth: Mucous membranes are moist.      Pharynx: Oropharynx is clear.   Eyes:      Extraocular Movements: Extraocular movements intact.      Pupils: Pupils are equal, round, and reactive to light.   Pulmonary:      Effort: Pulmonary effort is normal.   Musculoskeletal:         General: Normal range of motion.      Cervical back: Normal range of motion.   Skin:     General: Skin is warm and dry.   Neurological:      General: No focal deficit present.      Mental Status: She is alert and oriented to person, place, and time.   Psychiatric:         Attention and Perception: Attention and perception normal. She is attentive.         Mood and Affect: Mood and affect normal. Mood is not anxious.         Speech: Speech  normal.         Behavior: Behavior normal. Behavior is cooperative.         Thought Content: Thought content normal.         Cognition and Memory: Cognition and memory normal.         Judgment: Judgment normal. Judgment is not impulsive.     not currently breastfeeding.    Mental Status Exam:   Hygiene:   good  Cooperation:  Cooperative  Eye Contact:  Fair  Psychomotor Behavior:  Appropriate  Affect:  Full range and Appropriate  Hopelessness: Optimistic  Speech:  Normal  Thought Process:  Goal directed and Linear  Thought Content:  Normal and Mood congruent  Suicidal:  None  Homicidal:  None  Hallucinations:  None  Delusion:  None  Memory:  Intact  Orientation:  Person, Place, Time, and Situation  Reliability:  fair  Insight:  Fair  Judgement:  Fair  Impulse Control:  Fair  Physical/Medical Issues:  No       Short-term goals: Patient will be compliant with clinic appointments.  Patient will be engaged in therapy, medication compliant with minimal side effects. Patient  will report decrease of symptoms and frequency.    Long-term goals: Patient will have minimal symptoms of  with continued medication management. Patient will be compliant with treatment and appointments.     Strengths: Motivation for treatment, insight  Weaknesses: Coping skills, support    Prognosis: Guarded dependent on medication/follow up and treatment plan compliance.  Functional Status:  severe impairment in areas of daily functioning.      Assessment & Plan   Diagnoses and all orders for this visit:    1. Severe episode of recurrent major depressive disorder, without psychotic features (Primary)  -     ARIPiprazole (Abilify) 5 MG tablet; Take 1 tablet by mouth Daily.  Dispense: 30 tablet; Refill: 1  -     traZODone (DESYREL) 100 MG tablet; Take 2 tablets by mouth Every Night for 60 days.  Dispense: 60 tablet; Refill: 1    2. Generalized anxiety disorder  -     traZODone (DESYREL) 100 MG tablet; Take 2 tablets by mouth Every Night for 60 days.   Dispense: 60 tablet; Refill: 1    3. Sleeping difficulty  -     traZODone (DESYREL) 100 MG tablet; Take 2 tablets by mouth Every Night for 60 days.  Dispense: 60 tablet; Refill: 1  -     prazosin (MINIPRESS) 1 MG capsule; Take 1 capsule by mouth Every Night for 60 days.  Dispense: 30 capsule; Refill: 1    4. Post traumatic stress disorder (PTSD)  -     traZODone (DESYREL) 100 MG tablet; Take 2 tablets by mouth Every Night for 60 days.  Dispense: 60 tablet; Refill: 1  -     prazosin (MINIPRESS) 1 MG capsule; Take 1 capsule by mouth Every Night for 60 days.  Dispense: 30 capsule; Refill: 1          -Continue trazodone 200 mg nightly for sleep  -Continue prazosin 1 mg nightly for nightmares.  -Increase Abilify to 10 mg PO daily  for mood  -Medications sent to pharmacy at this time    Discussed medication and psychotherapy options.  Patient is to continue trazodone and prazosin without change.  Increase Abilify for depression.Reiterated with patient side effects of each medication individually as well as in combination.Patient reported verbalized understanding of instructionsdiscussed the risks, benefits, and side effects of the medication; client acknowledged and verbally consented.  Patient is aware to contact the [facility] with any worsening of symptom.  Patient is agreeable to go to the ER or call 911 should they begin SI/HI.      Return in about 4 weeks (around 11/30/2023), or if symptoms worsen or fail to improve, for Next scheduled follow up, Video visit.        This document has been electronically signed by KANDY Reyes   November 2, 2023 14:40 EDT     Errors in dictation may reflect use of voice recognition software and not all errors in transcription may have been detected prior to signing.

## 2023-11-15 ENCOUNTER — TELEPHONE (OUTPATIENT)
Dept: FAMILY MEDICINE CLINIC | Facility: CLINIC | Age: 33
End: 2023-11-15
Payer: COMMERCIAL

## 2023-11-15 DIAGNOSIS — F33.2 SEVERE EPISODE OF RECURRENT MAJOR DEPRESSIVE DISORDER, WITHOUT PSYCHOTIC FEATURES: Primary | ICD-10-CM

## 2023-11-15 DIAGNOSIS — F41.1 GENERALIZED ANXIETY DISORDER: ICD-10-CM

## 2023-11-15 NOTE — TELEPHONE ENCOUNTER
Dominique called and her anxiety is worse than ever she is unable to sit down and do simple tasks.  This has been going on for about 2 weeks.  She also stated she went back down to1 Abilify a day.  Nothing has happened to trigger this

## 2023-11-15 NOTE — TELEPHONE ENCOUNTER
Spoke with patient she verbalized understanding.  She is going to  2 weeks worth of samples to try

## 2023-11-16 ENCOUNTER — TELEPHONE (OUTPATIENT)
Dept: FAMILY MEDICINE CLINIC | Facility: CLINIC | Age: 33
End: 2023-11-16
Payer: COMMERCIAL

## 2023-11-28 ENCOUNTER — TELEPHONE (OUTPATIENT)
Dept: FAMILY MEDICINE CLINIC | Facility: CLINIC | Age: 33
End: 2023-11-28
Payer: COMMERCIAL

## 2023-11-28 DIAGNOSIS — F33.2 SEVERE EPISODE OF RECURRENT MAJOR DEPRESSIVE DISORDER, WITHOUT PSYCHOTIC FEATURES: Primary | ICD-10-CM

## 2023-11-28 RX ORDER — LURASIDONE HYDROCHLORIDE 20 MG/1
20 TABLET, FILM COATED ORAL DAILY
Qty: 30 TABLET | Refills: 0 | Status: SHIPPED | OUTPATIENT
Start: 2023-11-28 | End: 2023-12-28

## 2023-11-28 NOTE — TELEPHONE ENCOUNTER
Spoke with Dominique and she states the medication is doing about the same as the Abilify, not helping much

## 2023-12-19 ENCOUNTER — TELEMEDICINE (OUTPATIENT)
Dept: PSYCHIATRY | Facility: CLINIC | Age: 33
End: 2023-12-19
Payer: COMMERCIAL

## 2023-12-19 DIAGNOSIS — F33.2 SEVERE EPISODE OF RECURRENT MAJOR DEPRESSIVE DISORDER, WITHOUT PSYCHOTIC FEATURES: ICD-10-CM

## 2023-12-19 DIAGNOSIS — G47.9 SLEEPING DIFFICULTY: ICD-10-CM

## 2023-12-19 DIAGNOSIS — F43.10 POST TRAUMATIC STRESS DISORDER (PTSD): ICD-10-CM

## 2023-12-19 DIAGNOSIS — F41.1 GENERALIZED ANXIETY DISORDER: ICD-10-CM

## 2023-12-19 PROCEDURE — 99214 OFFICE O/P EST MOD 30 MIN: CPT

## 2023-12-19 RX ORDER — TRAZODONE HYDROCHLORIDE 100 MG/1
200 TABLET ORAL NIGHTLY
Qty: 60 TABLET | Refills: 1 | Status: SHIPPED | OUTPATIENT
Start: 2023-12-19 | End: 2024-02-17

## 2023-12-19 RX ORDER — PRAZOSIN HYDROCHLORIDE 2 MG/1
2 CAPSULE ORAL NIGHTLY
Qty: 30 CAPSULE | Refills: 1 | Status: SHIPPED | OUTPATIENT
Start: 2023-12-19 | End: 2024-02-17

## 2023-12-19 RX ORDER — LURASIDONE HYDROCHLORIDE 40 MG/1
40 TABLET, FILM COATED ORAL DAILY
Qty: 30 TABLET | Refills: 1 | Status: SHIPPED | OUTPATIENT
Start: 2023-12-19 | End: 2024-02-17

## 2023-12-19 NOTE — PROGRESS NOTES
"Subjective   Dominique De Dios is a 33 y.o. female who is here today for medication management follow up encounter.     “This provider is located at [facility], [address] [place] [address]. The provider identified herself as well as her credentials.   The Patient is at/in home address  by herself/himself, using her/his phone to access video application via [employer]. The patient's condition being diagnosed/treated is appropriate for telemedicine. The patient gave consent to be seen remotely, and when consent is given they understand that the consent allows for patient identifiable information to be sent to a third party as needed.   They may refuse to be seen remotely at any time. The electronic data is encrypted and password protected, and the patient has been advised of the potential risks to privacy not withstanding such measures.”     Start: 1603  Conclusion:1613    Chief Complaint:  anxiety, depression     HPI:  History of Present Illness    Insurance would not cover Vraylar, therefore patient was transitioned to Latuda 20 mg daily between encounters.  Patient reports that she has been feeling a little better but feels that depression is still overwhelming.  She reports that isolating behavior has gotten slightly better.  She reports that she is forced herself this weekend to take her daughter to the water park for her birthday and feels that this was good for her.  She reports that energy levels are still low but slightly improved.  Sadness is not as overwhelming.  Anxiety has been up.  She reports anxiety is worse when she is at school.  She reports that \"when I go through the guard check at school, heart rate skyrocket's.\"  Sleep is still difficult.  She is averaging 4 to 6 hours.  Nightmares have increased.  Continues with therapy no reported change with appetite.  She denies AVH. She denies SI and HI. She denies SH.     Past Psych History: Diagnosed with anxiety and depression per PCP.  " Medication management in the past has been by primary care.  Denies any inpatient psychiatric hospitalizations or outpatient providers.  Denies a history of TBI or seizures.  Denies any knowledge of exposure to illicit substance or toxins while in utero.  Denies any knowledge of  complications.    Previous Psych Meds: Patient reports that in the past she has previously trialed Wellbutrin, Prozac, Lexapro, Celexa, and BuSpar.  Reports that she does feel she has tried other ones but cannot recall them at this time.    Substance Abuse: Patient denies a history of or present illicit substance use, nicotine.  Does report current utilization socially of alcohol.    Social History: Patient was born and raised in [place] moving to [place] during high school.  Parents .  1 sister.  High school and college graduate.  Employed as a .  Previous marriage and .  1 daughter age 6.  Denies any incarcerations or previous  service.    Family Psychiatric History:  family history includes Breast cancer in her maternal grandmother; Heart attack in her father; Heart disease in her father; Stroke in her father.    Medical/Surgical History:  Past Medical History:   Diagnosis Date    Acid reflux     Anxiety     Elevated cholesterol     Hypertension     Migraine     Migraines     Urinary tract infection      Past Surgical History:   Procedure Laterality Date     SECTION Bilateral 2016    Procedure:  SECTION PRIMARY;  Surgeon: Valdez Bach DO;  Location: [facility] LABOR DELIVERY;  Service:        Allergies   Allergen Reactions    Ceclor [Cefaclor] Rash     childhood           Current Medications:   Current Outpatient Medications   Medication Sig Dispense Refill    Lurasidone HCl (LATUDA) 40 MG tablet tablet Take 1 tablet by mouth Daily for 60 days. 30 tablet 1    prazosin (MINIPRESS) 2 MG capsule Take 1 capsule by mouth Every Night for 60  days. 30 capsule 1    traZODone (DESYREL) 100 MG tablet Take 2 tablets by mouth Every Night for 60 days. 60 tablet 1    pantoprazole (PROTONIX) 40 MG EC tablet 1 tablet daily in the evening      pravastatin (PRAVACHOL) 20 MG tablet Take 1 tablet every day by oral route in the evening.       No current facility-administered medications for this visit.         Review of Systems   Constitutional:  Negative for activity change, appetite change and fatigue.   HENT: Negative.     Eyes: Negative.    Respiratory: Negative.     Cardiovascular: Negative.    Gastrointestinal: Negative.    Endocrine: Negative.    Genitourinary: Negative.    Musculoskeletal: Negative.    Skin: Negative.    Allergic/Immunologic: Negative.    Neurological: Negative.    Hematological: Negative.    Psychiatric/Behavioral:  Negative for decreased concentration, dysphoric mood, self-injury, sleep disturbance and suicidal ideas. The patient is not nervous/anxious.     denies HEENT, cardiovascular, respiratory, liver, renal, GI/, endocrine, neuro, DERM, hematology, immunology, musculoskeletal disorders.    Objective   Physical Exam  Vitals reviewed.   Constitutional:       Appearance: Normal appearance.   HENT:      Head: Normocephalic.      Nose: Nose normal.      Mouth/Throat:      Mouth: Mucous membranes are moist.      Pharynx: Oropharynx is clear.   Eyes:      Extraocular Movements: Extraocular movements intact.      Pupils: Pupils are equal, round, and reactive to light.   Pulmonary:      Effort: Pulmonary effort is normal.   Musculoskeletal:         General: Normal range of motion.      Cervical back: Normal range of motion.   Skin:     General: Skin is warm and dry.   Neurological:      General: No focal deficit present.      Mental Status: She is alert and oriented to person, place, and time.   Psychiatric:         Attention and Perception: Attention and perception normal. She is attentive.         Mood and Affect: Mood and affect normal.  Mood is not anxious.         Speech: Speech normal.         Behavior: Behavior normal. Behavior is cooperative.         Thought Content: Thought content normal.         Cognition and Memory: Cognition and memory normal.         Judgment: Judgment normal. Judgment is not impulsive.     not currently breastfeeding.    Mental Status Exam:   Hygiene:   good  Cooperation:  Cooperative  Eye Contact:  Fair  Psychomotor Behavior:  Appropriate  Affect:  Full range and Appropriate  Hopelessness: Optimistic  Speech:  Normal  Thought Process:  Goal directed and Linear  Thought Content:  Normal and Mood congruent  Suicidal:  None  Homicidal:  None  Hallucinations:  None  Delusion:  None  Memory:  Intact  Orientation:  Person, Place, Time, and Situation  Reliability:  fair  Insight:  Fair  Judgement:  Fair  Impulse Control:  Fair  Physical/Medical Issues:  No       Short-term goals: Patient will be compliant with clinic appointments.  Patient will be engaged in therapy, medication compliant with minimal side effects. Patient  will report decrease of symptoms and frequency.    Long-term goals: Patient will have minimal symptoms of  with continued medication management. Patient will be compliant with treatment and appointments.     Strengths: Motivation for treatment, insight  Weaknesses: Coping skills, support    Prognosis: Guarded dependent on medication/follow up and treatment plan compliance.  Functional Status:  severe impairment in areas of daily functioning.      Assessment & Plan   Diagnoses and all orders for this visit:    1. Severe episode of recurrent major depressive disorder, without psychotic features  -     Lurasidone HCl (LATUDA) 40 MG tablet tablet; Take 1 tablet by mouth Daily for 60 days.  Dispense: 30 tablet; Refill: 1  -     traZODone (DESYREL) 100 MG tablet; Take 2 tablets by mouth Every Night for 60 days.  Dispense: 60 tablet; Refill: 1    2. Sleeping difficulty  -     prazosin (MINIPRESS) 2 MG capsule; Take  1 capsule by mouth Every Night for 60 days.  Dispense: 30 capsule; Refill: 1  -     traZODone (DESYREL) 100 MG tablet; Take 2 tablets by mouth Every Night for 60 days.  Dispense: 60 tablet; Refill: 1    3. Post traumatic stress disorder (PTSD)  -     prazosin (MINIPRESS) 2 MG capsule; Take 1 capsule by mouth Every Night for 60 days.  Dispense: 30 capsule; Refill: 1  -     traZODone (DESYREL) 100 MG tablet; Take 2 tablets by mouth Every Night for 60 days.  Dispense: 60 tablet; Refill: 1    4. Generalized anxiety disorder  -     traZODone (DESYREL) 100 MG tablet; Take 2 tablets by mouth Every Night for 60 days.  Dispense: 60 tablet; Refill: 1            -Continue trazodone 200 mg nightly for sleep  -Increase prazosin to 2 mg nightly for nightmares.  -Increase Latuda to 40 mg p.o. daily for depression  -Medications sent to pharmacy at this time    Discussed medication and psychotherapy options.  Plan at this time will be to increase Latuda and prazosin to better target symptoms.  Continue trazodone without change.  May consider Seroquel in the future.  Reiterated with patient side effects of each medication individually as well as in combination.Patient reported verbalized understanding of instructionsdiscussed the risks, benefits, and side effects of the medication; client acknowledged and verbally consented.  Patient is aware to contact the [facility] with any worsening of symptom.  Patient is agreeable to go to the ER or call 911 should they begin SI/HI.      Return in about 6 weeks (around 1/30/2024), or if symptoms worsen or fail to improve, for Next scheduled follow up.        This document has been electronically signed by KANDY Reyes   December 19, 2023 16:18 EST     Errors in dictation may reflect use of voice recognition software and not all errors in transcription may have been detected prior to signing.

## 2023-12-29 DIAGNOSIS — F33.2 SEVERE EPISODE OF RECURRENT MAJOR DEPRESSIVE DISORDER, WITHOUT PSYCHOTIC FEATURES: ICD-10-CM

## 2024-01-02 RX ORDER — ARIPIPRAZOLE 5 MG/1
5 TABLET ORAL DAILY
Qty: 30 TABLET | Refills: 1 | OUTPATIENT
Start: 2024-01-02

## 2024-02-20 ENCOUNTER — TELEMEDICINE (OUTPATIENT)
Dept: PSYCHIATRY | Facility: CLINIC | Age: 34
End: 2024-02-20
Payer: COMMERCIAL

## 2024-02-20 DIAGNOSIS — F33.2 SEVERE EPISODE OF RECURRENT MAJOR DEPRESSIVE DISORDER, WITHOUT PSYCHOTIC FEATURES: ICD-10-CM

## 2024-02-20 DIAGNOSIS — G47.9 SLEEPING DIFFICULTY: ICD-10-CM

## 2024-02-20 DIAGNOSIS — F41.1 GENERALIZED ANXIETY DISORDER: ICD-10-CM

## 2024-02-20 DIAGNOSIS — F43.10 POST TRAUMATIC STRESS DISORDER (PTSD): ICD-10-CM

## 2024-02-20 PROCEDURE — 99214 OFFICE O/P EST MOD 30 MIN: CPT

## 2024-02-20 RX ORDER — TRAZODONE HYDROCHLORIDE 100 MG/1
200 TABLET ORAL NIGHTLY
Qty: 60 TABLET | Refills: 1 | Status: SHIPPED | OUTPATIENT
Start: 2024-02-20 | End: 2024-04-20

## 2024-02-20 RX ORDER — ARIPIPRAZOLE 5 MG/1
TABLET ORAL
Qty: 13 TABLET | Refills: 0 | Status: SHIPPED | OUTPATIENT
Start: 2024-02-20 | End: 2024-03-05

## 2024-02-20 NOTE — PROGRESS NOTES
"Subjective   Dominique De Dios is a 33 y.o. female who is here today for medication management follow up encounter.     “This provider is located at [facility], [address] [place] [address]. The provider identified herself as well as her credentials.   The Patient is at/in home address  by herself/himself, using her/his phone to access video application via [email or web address]. The patient's condition being diagnosed/treated is appropriate for telemedicine. The patient gave consent to be seen remotely, and when consent is given they understand that the consent allows for patient identifiable information to be sent to a third party as needed.   They may refuse to be seen remotely at any time. The electronic data is encrypted and password protected, and the patient has been advised of the potential risks to privacy not withstanding such measures.”     Start: 1630  Conclusion:1647    Chief Complaint:  anxiety, depression     HPI:  History of Present Illness    Patient reports that she noticed after increasing dose of Latuda that depression has gotten \"the worst it has ever been.\"  Continues to note struggles with low motivation, sadness, crying spells, self-care deficits, low energy.  Patient reports that she did not call into the office because she did not \"want to be intrusive.\"  Patient reports anxiety has been better though at times it is increased.  She feels that mood is worsened by work.  Sleep has been up and down.  Often fatigued.  Denies any nightmares, no longer taking prazosin.  Reports variation in appetite.  She reports sometimes she is not hungry and sometimes she wants to eat all the time.  Does report difficulty with procrastination, inattention, distractibility, forgetfulness has been present since childhood.She denies AVH. She denies SI and HI. She denies SH.     Past Psych History: Diagnosed with anxiety and depression per PCP.  Medication management in the past has been by primary care.  " Denies any inpatient psychiatric hospitalizations or outpatient providers.  Denies a history of TBI or seizures.  Denies any knowledge of exposure to illicit substance or toxins while in utero.  Denies any knowledge of  complications.    Previous Psych Meds: Patient reports that in the past she has previously trialed Wellbutrin, Prozac, Lexapro, Celexa, and BuSpar.  Reports that she does feel she has tried other ones but cannot recall them at this time.    Substance Abuse: Patient denies a history of or present illicit substance use, nicotine.  Does report current utilization socially of alcohol.    Social History: Patient was born and raised in [place] moving to [place] [facility] during high school.  Parents .  1 sister.  High school and college graduate.  Employed as a .  Previous marriage and .  1 daughter age 6.  Denies any incarcerations or previous  service.    Family Psychiatric History:  family history includes Breast cancer in her maternal grandmother; Heart attack in her father; Heart disease in her father; Stroke in her father.    Medical/Surgical History:  Past Medical History:   Diagnosis Date    Acid reflux     Anxiety     Elevated cholesterol     Hypertension     Migraine     Migraines     Urinary tract infection      Past Surgical History:   Procedure Laterality Date     SECTION Bilateral 2016    Procedure:  SECTION PRIMARY;  Surgeon: Valdez Bach DO;  Location: [facility] LABOR DELIVERY;  Service:        Allergies   Allergen Reactions    Ceclor [Cefaclor] Rash     childhood           Current Medications:   Current Outpatient Medications   Medication Sig Dispense Refill    traZODone (DESYREL) 100 MG tablet Take 2 tablets by mouth Every Night for 60 days. 60 tablet 1    ARIPiprazole (Abilify) 5 MG tablet Take 0.5 tablets by mouth Daily for 3 days, THEN 1 tablet Daily for 11 days. 13 tablet 0    pantoprazole  (PROTONIX) 40 MG EC tablet 1 tablet daily in the evening      pravastatin (PRAVACHOL) 20 MG tablet Take 1 tablet every day by oral route in the evening.       No current facility-administered medications for this visit.         Review of Systems   Constitutional:  Positive for activity change, appetite change and fatigue.   HENT: Negative.     Eyes: Negative.    Respiratory: Negative.     Cardiovascular: Negative.    Gastrointestinal: Negative.    Endocrine: Negative.    Genitourinary: Negative.    Musculoskeletal: Negative.    Skin: Negative.    Allergic/Immunologic: Negative.    Neurological: Negative.    Hematological: Negative.    Psychiatric/Behavioral:  Positive for dysphoric mood and sleep disturbance. Negative for decreased concentration, self-injury and suicidal ideas. The patient is not nervous/anxious.     denies HEENT, cardiovascular, respiratory, liver, renal, GI/, endocrine, neuro, DERM, hematology, immunology, musculoskeletal disorders.    Objective   Physical Exam  Vitals reviewed.   Constitutional:       Appearance: Normal appearance.   HENT:      Head: Normocephalic.      Nose: Nose normal.      Mouth/Throat:      Mouth: Mucous membranes are moist.      Pharynx: Oropharynx is clear.   Eyes:      Extraocular Movements: Extraocular movements intact.      Pupils: Pupils are equal, round, and reactive to light.   Pulmonary:      Effort: Pulmonary effort is normal.   Musculoskeletal:         General: Normal range of motion.      Cervical back: Normal range of motion.   Skin:     General: Skin is warm and dry.   Neurological:      General: No focal deficit present.      Mental Status: She is alert and oriented to person, place, and time.   Psychiatric:         Attention and Perception: Attention and perception normal. She is attentive.         Mood and Affect: Mood is depressed. Mood is not anxious. Affect is tearful.         Speech: Speech normal.         Behavior: Behavior normal. Behavior is  cooperative.         Thought Content: Thought content normal.         Cognition and Memory: Cognition and memory normal.         Judgment: Judgment normal. Judgment is not impulsive.     not currently breastfeeding.    Mental Status Exam:   Hygiene:   fair  Cooperation:  Cooperative  Eye Contact:  Fair  Psychomotor Behavior:  Appropriate  Affect:  Restricted and at times tearful  Hopelessness: Optimistic  Speech:  Normal  Thought Process:  Goal directed and Linear  Thought Content:  Normal and Mood congruent  Suicidal:  None  Homicidal:  None  Hallucinations:  None  Delusion:  None  Memory:  Intact  Orientation:  Person, Place, Time, and Situation  Reliability:  fair  Insight:  Fair  Judgement:  Fair  Impulse Control:  Fair  Physical/Medical Issues:  No       Short-term goals: Patient will be compliant with clinic appointments.  Patient will be engaged in therapy, medication compliant with minimal side effects. Patient  will report decrease of symptoms and frequency.    Long-term goals: Patient will have minimal symptoms of  with continued medication management. Patient will be compliant with treatment and appointments.     Strengths: Motivation for treatment, insight  Weaknesses: Coping skills, support    Prognosis: Guarded dependent on medication/follow up and treatment plan compliance.  Functional Status:  severe impairment in areas of daily functioning.      Assessment & Plan   Diagnoses and all orders for this visit:    1. Severe episode of recurrent major depressive disorder, without psychotic features  -     ARIPiprazole (Abilify) 5 MG tablet; Take 0.5 tablets by mouth Daily for 3 days, THEN 1 tablet Daily for 11 days.  Dispense: 13 tablet; Refill: 0  -     traZODone (DESYREL) 100 MG tablet; Take 2 tablets by mouth Every Night for 60 days.  Dispense: 60 tablet; Refill: 1    2. Sleeping difficulty  -     traZODone (DESYREL) 100 MG tablet; Take 2 tablets by mouth Every Night for 60 days.  Dispense: 60 tablet;  Refill: 1    3. Post traumatic stress disorder (PTSD)  -     traZODone (DESYREL) 100 MG tablet; Take 2 tablets by mouth Every Night for 60 days.  Dispense: 60 tablet; Refill: 1    4. Generalized anxiety disorder  -     traZODone (DESYREL) 100 MG tablet; Take 2 tablets by mouth Every Night for 60 days.  Dispense: 60 tablet; Refill: 1              -Continue trazodone 200 mg nightly for sleep  -Discontinue prazosin, no longer taking  -Taper Latuda  -Restart Abilify 2.5 mg p.o. daily x 3 days and increase to 5 mg p.o. daily thereafter for depression  -CPT 3 at next encounter  -Medications sent to pharmacy at this time    Discussed medication and psychotherapy options.  Plan at this time will be to restart Abilify as she did have better results with this medication compared to the others.  May consider Lamictal in the future.  Also differential at this time includes rule out ADHD.  Reiterated with patient side effects of each medication individually as well as in combination.Patient reported verbalized understanding of instructionsdiscussed the risks, benefits, and side effects of the medication; client acknowledged and verbally consented.  Patient is aware to contact the [facility] with any worsening of symptom.  Patient is agreeable to go to the ER or call 911 should they begin SI/HI.    SUICIDE RISK ASSESSMENT: Unalterable demographics and a history of mental health intervention indicate this patient is in a high risk category compared to the general population. At present, the patient denies active SI/HI, intentions, or plans at this time and agrees to seek immediate care should such thoughts develop. The patient verbalizes understanding of how to access emergency care if needed and agrees to do so. Consideration of suicide risk and protective factors such as history, current presentation, individual strengths and weaknesses, psychosocial and environmental stressors and variables, psychiatric illness and  symptoms, medical conditions and pain, took place in this interview. Based on those considerations, the patient is determined: within individual baseline and presenting no imminent risk for suicide or homicide. Other recommendations: The patient does not meet the criteria for inpatient admission and is not a safety risk to self or others at today's visit. Inpatient treatment offers no significant advantages over outpatient treatment for this patient at today's visit.      SAFETY PLAN:  Patient was given ample time for questions and fully participated in treatment planning.  Patient was encouraged to call the clinic with any questions or concerns.  Patient was informed of access to emergency care. If patient were to develop any significant symptomatology, suicidal ideation, homicidal ideation, any concerns, or feel unsafe at any time they are to call the clinic and if unable to get immediate assistance should immediately call 911 or go to the nearest emergency room.  The patient is advised to remove or secure (lock away) all lethal weapons (including guns) and sharps (including razors, scissors, knives, etc.).  All medications (including any prescribed and any over the counter medications) should be stored in a safe and secured location that is not obtainable by children/adolescents.  Patient was given an opportunity and encouraged to ask questions about their medication, illness, and treatment. Patient contracted verbally for the following: If you are experiencing an emotional crisis or have thoughts of harming yourself or others, please go to your nearest local emergency room or call 911. Will continue to re-assess medication response and side effects frequently to establish efficacy and ensure safety. Risks, any black box warnings, side effects, off label usage, and benefits of medication and treatment discussed with patient, along with potential adverse side effects of current and/or newly prescribed medication,  alternative treatment options, and OTC medications.  Patient verbalized understanding of potential risks, any off label use of medication, any black box warnings, and any side effects in their own words. The patient verbalized understanding and agreed to comply with the safety plan discussed in their own words.  Patient given the number to the office. Number also available to the 24- hour suicide hotline.      Return in about 2 weeks (around 3/5/2024), or if symptoms worsen or fail to improve, for Next scheduled follow up.        This document has been electronically signed by KANDY Reyes   February 20, 2024 17:38 EST     Errors in dictation may reflect use of voice recognition software and not all errors in transcription may have been detected prior to signing.

## 2024-02-22 ENCOUNTER — TELEPHONE (OUTPATIENT)
Dept: FAMILY MEDICINE CLINIC | Facility: CLINIC | Age: 34
End: 2024-02-22
Payer: COMMERCIAL

## 2024-03-07 ENCOUNTER — OFFICE VISIT (OUTPATIENT)
Dept: PSYCHIATRY | Facility: CLINIC | Age: 34
End: 2024-03-07
Payer: COMMERCIAL

## 2024-03-07 VITALS
TEMPERATURE: 97.1 F | WEIGHT: 137.2 LBS | DIASTOLIC BLOOD PRESSURE: 95 MMHG | BODY MASS INDEX: 22.86 KG/M2 | HEART RATE: 110 BPM | OXYGEN SATURATION: 98 % | SYSTOLIC BLOOD PRESSURE: 127 MMHG | HEIGHT: 65 IN

## 2024-03-07 DIAGNOSIS — F41.1 GENERALIZED ANXIETY DISORDER: ICD-10-CM

## 2024-03-07 DIAGNOSIS — F33.2 SEVERE EPISODE OF RECURRENT MAJOR DEPRESSIVE DISORDER, WITHOUT PSYCHOTIC FEATURES: Primary | ICD-10-CM

## 2024-03-07 DIAGNOSIS — F90.9 ATTENTION DEFICIT HYPERACTIVITY DISORDER (ADHD), UNSPECIFIED ADHD TYPE: ICD-10-CM

## 2024-03-07 DIAGNOSIS — G47.9 SLEEPING DIFFICULTY: ICD-10-CM

## 2024-03-07 DIAGNOSIS — F43.10 POST TRAUMATIC STRESS DISORDER (PTSD): ICD-10-CM

## 2024-03-07 PROCEDURE — 99214 OFFICE O/P EST MOD 30 MIN: CPT

## 2024-03-07 RX ORDER — ARIPIPRAZOLE 5 MG/1
5 TABLET ORAL DAILY
Qty: 30 TABLET | Refills: 1 | Status: SHIPPED | OUTPATIENT
Start: 2024-03-07

## 2024-03-07 RX ORDER — TRAZODONE HYDROCHLORIDE 100 MG/1
200 TABLET ORAL NIGHTLY
Qty: 60 TABLET | Refills: 1 | Status: SHIPPED | OUTPATIENT
Start: 2024-03-07 | End: 2024-05-06

## 2024-03-07 RX ORDER — PROPRANOLOL HYDROCHLORIDE 20 MG/1
20 TABLET ORAL 2 TIMES DAILY
Qty: 28 TABLET | Refills: 0 | Status: SHIPPED | OUTPATIENT
Start: 2024-03-07 | End: 2024-03-21

## 2024-03-07 NOTE — PROGRESS NOTES
Subjective   Dominique De Dios is a 33 y.o. female who is here today for medication management follow up encounter.       Chief Complaint:  anxiety, depression     HPI:  History of Present Illness    Patient reports that she has noticed a positive improvement in depression symptoms since transitioning back to Abilify.  She reports that overall she has noticed that she has had an improvement in motivation slightly.  Sadness is not overwhelming.  Self-care has improved.  Anxiety has been increased more situationally.  Occasional panic.  Reports that she often feels that she is sweaty, her heart is racing.  Prominent/present prior to Abilify initiation.  Sleep is doing okay.  Continues to note frequent awakenings, denies any nightmares.  Appetite is vastly unchanged. Body mass index is 22.83 kg/m².  Continues to note difficulty with procrastination, inattention, distractibility, forgetfulness has been present since childhood.She denies AVH. She denies SI and HI. She denies SH.     Past Psych History: Diagnosed with anxiety and depression per PCP.  Medication management in the past has been by primary care.  Denies any inpatient psychiatric hospitalizations or outpatient providers.  Denies a history of TBI or seizures.  Denies any knowledge of exposure to illicit substance or toxins while in utero.  Denies any knowledge of  complications.    Previous Psych Meds: Patient reports that in the past she has previously trialed Wellbutrin, Prozac, Lexapro, Celexa, and BuSpar.  Reports that she does feel she has tried other ones but cannot recall them at this time.    Substance Abuse: Patient denies a history of or present illicit substance use, nicotine.  Does report current utilization socially of alcohol.    Social History: Patient was born and raised in [place] moving [place].  Parents .  1 sister.  High school and college graduate.  Employed as a special education  teacher.  Previous marriage and .  1 daughter age 6.  Denies any incarcerations or previous  service.    Family Psychiatric History:  family history includes Breast cancer in her maternal grandmother; Heart attack in her father; Heart disease in her father; Stroke in her father.    Medical/Surgical History:  Past Medical History:   Diagnosis Date    Acid reflux     Anxiety     Elevated cholesterol     Hypertension     Migraine     Migraines     Urinary tract infection      Past Surgical History:   Procedure Laterality Date     SECTION Bilateral 2016    Procedure:  SECTION PRIMARY;  Surgeon: Valdez Bach DO;  Location: [facility] LABOR DELIVERY;  Service:        Allergies   Allergen Reactions    Ceclor [Cefaclor] Rash     childhood           Current Medications:   Current Outpatient Medications   Medication Sig Dispense Refill    traZODone (DESYREL) 100 MG tablet Take 2 tablets by mouth Every Night for 60 days. 60 tablet 1    ARIPiprazole (Abilify) 5 MG tablet Take 1 tablet by mouth Daily. 30 tablet 1    propranolol (INDERAL) 20 MG tablet Take 1 tablet by mouth 2 (Two) Times a Day for 14 days. 28 tablet 0     No current facility-administered medications for this visit.         Review of Systems   Constitutional:  Positive for activity change, appetite change and fatigue.   HENT: Negative.     Eyes: Negative.    Respiratory: Negative.     Cardiovascular: Negative.    Gastrointestinal: Negative.    Endocrine: Negative.    Genitourinary: Negative.    Musculoskeletal: Negative.    Skin: Negative.    Allergic/Immunologic: Negative.    Neurological: Negative.    Hematological: Negative.    Psychiatric/Behavioral:  Positive for dysphoric mood and sleep disturbance. Negative for decreased concentration, self-injury and suicidal ideas. The patient is not nervous/anxious.     denies HEENT, cardiovascular, respiratory, liver, renal, GI/, endocrine, neuro, DERM, hematology,  "immunology, musculoskeletal disorders.    Objective   Physical Exam  Vitals reviewed.   Constitutional:       Appearance: Normal appearance.   HENT:      Head: Normocephalic.      Nose: Nose normal.      Mouth/Throat:      Mouth: Mucous membranes are moist.      Pharynx: Oropharynx is clear.   Eyes:      Extraocular Movements: Extraocular movements intact.      Pupils: Pupils are equal, round, and reactive to light.   Pulmonary:      Effort: Pulmonary effort is normal.   Musculoskeletal:         General: Normal range of motion.      Cervical back: Normal range of motion.   Skin:     General: Skin is warm and dry.   Neurological:      General: No focal deficit present.      Mental Status: She is alert and oriented to person, place, and time.   Psychiatric:         Attention and Perception: Attention and perception normal. She is attentive.         Mood and Affect: Mood is depressed. Mood is not anxious. Affect is tearful.         Speech: Speech normal.         Behavior: Behavior normal. Behavior is cooperative.         Thought Content: Thought content normal.         Cognition and Memory: Cognition and memory normal.         Judgment: Judgment normal. Judgment is not impulsive.     Blood pressure 127/95, pulse 110, temperature 97.1 °F (36.2 °C), temperature source Temporal, height 165.1 cm (65\"), weight 62.2 kg (137 lb 3.2 oz), SpO2 98%, not currently breastfeeding.    Mental Status Exam:   Hygiene:   fair  Cooperation:  Cooperative  Eye Contact:  Fair  Psychomotor Behavior:  Appropriate  Affect:  Restricted and at times tearful  Hopelessness: Optimistic  Speech:  Normal  Thought Process:  Goal directed and Linear  Thought Content:  Normal and Mood congruent  Suicidal:  None  Homicidal:  None  Hallucinations:  None  Delusion:  None  Memory:  Intact  Orientation:  Person, Place, Time, and Situation  Reliability:  fair  Insight:  Fair  Judgement:  Fair  Impulse Control:  Fair  Physical/Medical Issues:  No "       Short-term goals: Patient will be compliant with clinic appointments.  Patient will be engaged in therapy, medication compliant with minimal side effects. Patient  will report decrease of symptoms and frequency.    Long-term goals: Patient will have minimal symptoms of  with continued medication management. Patient will be compliant with treatment and appointments.     Strengths: Motivation for treatment, insight  Weaknesses: Coping skills, support    Prognosis: Guarded dependent on medication/follow up and treatment plan compliance.  Functional Status:  severe impairment in areas of daily functioning.      Assessment & Plan   Diagnoses and all orders for this visit:    1. Severe episode of recurrent major depressive disorder, without psychotic features (Primary)  -     ARIPiprazole (Abilify) 5 MG tablet; Take 1 tablet by mouth Daily.  Dispense: 30 tablet; Refill: 1  -     traZODone (DESYREL) 100 MG tablet; Take 2 tablets by mouth Every Night for 60 days.  Dispense: 60 tablet; Refill: 1    2. Sleeping difficulty  -     traZODone (DESYREL) 100 MG tablet; Take 2 tablets by mouth Every Night for 60 days.  Dispense: 60 tablet; Refill: 1    3. Post traumatic stress disorder (PTSD)  -     ARIPiprazole (Abilify) 5 MG tablet; Take 1 tablet by mouth Daily.  Dispense: 30 tablet; Refill: 1  -     traZODone (DESYREL) 100 MG tablet; Take 2 tablets by mouth Every Night for 60 days.  Dispense: 60 tablet; Refill: 1    4. Generalized anxiety disorder  -     propranolol (INDERAL) 20 MG tablet; Take 1 tablet by mouth 2 (Two) Times a Day for 14 days.  Dispense: 28 tablet; Refill: 0  -     ARIPiprazole (Abilify) 5 MG tablet; Take 1 tablet by mouth Daily.  Dispense: 30 tablet; Refill: 1  -     traZODone (DESYREL) 100 MG tablet; Take 2 tablets by mouth Every Night for 60 days.  Dispense: 60 tablet; Refill: 1    5. Attention deficit hyperactivity disorder (ADHD), unspecified ADHD type      -UDS collected and pending  -Past reviewed  nonconcerning  -CPT 3 conducted resulting into atypical T-scores suggesting difficulty with impulsivity  -Continue trazodone 200 mg nightly for sleep  -Continue Abilify 5 mg p.o. daily for depression  -Start propranolol 20 mg p.o. twice daily for anxiety  -Medications sent to pharmacy at this time    Discussed medication and psychotherapy options.  Plan is to continue Abilify and trazodone without change.  Start propranolol for anxiety.  Consider stimulant at next encounter.  Reiterated with patient side effects of each medication individually as well as in combination.Patient reported verbalized understanding of instructionsdiscussed the risks, benefits, and side effects of the medication; client acknowledged and verbally consented.  Patient is aware to [facility] with any worsening of symptom.  Patient is agreeable to go to the ER or call 911 should they begin SI/HI.    SUICIDE RISK ASSESSMENT: Unalterable demographics and a history of mental health intervention indicate this patient is in a high risk category compared to the general population. At present, the patient denies active SI/HI, intentions, or plans at this time and agrees to seek immediate care should such thoughts develop. The patient verbalizes understanding of how to access emergency care if needed and agrees to do so. Consideration of suicide risk and protective factors such as history, current presentation, individual strengths and weaknesses, psychosocial and environmental stressors and variables, psychiatric illness and symptoms, medical conditions and pain, took place in this interview. Based on those considerations, the patient is determined: within individual baseline and presenting no imminent risk for suicide or homicide. Other recommendations: The patient does not meet the criteria for inpatient admission and is not a safety risk to self or others at today's visit. Inpatient treatment offers no significant advantages over  outpatient treatment for this patient at today's visit.      SAFETY PLAN:  Patient was given ample time for questions and fully participated in treatment planning.  Patient was encouraged to call the clinic with any questions or concerns.  Patient was informed of access to emergency care. If patient were to develop any significant symptomatology, suicidal ideation, homicidal ideation, any concerns, or feel unsafe at any time they are to call the clinic and if unable to get immediate assistance should immediately call 911 or go to the nearest emergency room.  The patient is advised to remove or secure (lock away) all lethal weapons (including guns) and sharps (including razors, scissors, knives, etc.).  All medications (including any prescribed and any over the counter medications) should be stored in a safe and secured location that is not obtainable by children/adolescents.  Patient was given an opportunity and encouraged to ask questions about their medication, illness, and treatment. Patient contracted verbally for the following: If you are experiencing an emotional crisis or have thoughts of harming yourself or others, please go to your nearest local emergency room or call 911. Will continue to re-assess medication response and side effects frequently to establish efficacy and ensure safety. Risks, any black box warnings, side effects, off label usage, and benefits of medication and treatment discussed with patient, along with potential adverse side effects of current and/or newly prescribed medication, alternative treatment options, and OTC medications.  Patient verbalized understanding of potential risks, any off label use of medication, any black box warnings, and any side effects in their own words. The patient verbalized understanding and agreed to comply with the safety plan discussed in their own words.  Patient given the number to the office. Number also available to the 24- hour suicide  hotline.      Return in about 2 weeks (around 3/21/2024), or if symptoms worsen or fail to improve, for Next scheduled follow up.        This document has been electronically signed by KANDY Reyes   March 7, 2024 18:25 EST     Errors in dictation may reflect use of voice recognition software and not all errors in transcription may have been detected prior to signing.

## 2024-03-21 ENCOUNTER — OFFICE VISIT (OUTPATIENT)
Dept: PSYCHIATRY | Facility: CLINIC | Age: 34
End: 2024-03-21
Payer: COMMERCIAL

## 2024-03-21 VITALS
DIASTOLIC BLOOD PRESSURE: 87 MMHG | HEIGHT: 65 IN | BODY MASS INDEX: 22.42 KG/M2 | SYSTOLIC BLOOD PRESSURE: 118 MMHG | TEMPERATURE: 98.2 F | HEART RATE: 91 BPM | WEIGHT: 134.6 LBS | OXYGEN SATURATION: 96 %

## 2024-03-21 DIAGNOSIS — F90.9 ATTENTION DEFICIT HYPERACTIVITY DISORDER (ADHD), UNSPECIFIED ADHD TYPE: ICD-10-CM

## 2024-03-21 DIAGNOSIS — F41.1 GENERALIZED ANXIETY DISORDER: ICD-10-CM

## 2024-03-21 DIAGNOSIS — F43.10 POST TRAUMATIC STRESS DISORDER (PTSD): ICD-10-CM

## 2024-03-21 DIAGNOSIS — F33.2 SEVERE EPISODE OF RECURRENT MAJOR DEPRESSIVE DISORDER, WITHOUT PSYCHOTIC FEATURES: Primary | ICD-10-CM

## 2024-03-21 PROCEDURE — 99214 OFFICE O/P EST MOD 30 MIN: CPT

## 2024-03-21 RX ORDER — PROPRANOLOL HYDROCHLORIDE 20 MG/1
20 TABLET ORAL 2 TIMES DAILY
Qty: 60 TABLET | Refills: 1 | Status: SHIPPED | OUTPATIENT
Start: 2024-03-21 | End: 2024-05-20

## 2024-03-21 RX ORDER — METHYLPHENIDATE HYDROCHLORIDE 18 MG/1
18 TABLET ORAL DAILY
Qty: 7 TABLET | Refills: 0 | Status: SHIPPED | OUTPATIENT
Start: 2024-03-21 | End: 2024-03-25 | Stop reason: SDUPTHER

## 2024-03-21 NOTE — PROGRESS NOTES
"Subjective   Dominique De Dios is a 33 y.o. female who is here today for medication management follow up encounter.       Chief Complaint:  anxiety, depression     HPI:  History of Present Illness    Patient denies negative side effects associated with current regimen.  She reports that depression has not really changed.  She reports that she feels that negative thoughts increased as she often gets overwhelmed and frustrated with things in which she knows that she needs to do but she cannot get motivated to do so.  She reports that she is very angry at herself because she is often distracted and forgetful.  Anxiety is doing okay since starting propranolol.  Continues to note that her job is a huge stressor for her as she feels burnt out and feels that she \"dreads going to work.\"  Sleep is still struggle some.  She feels that she can fall asleep easily however is often awake multiple times throughout the night.  Vivid dreams but denies nightmares.  No change with appetite.Body mass index is 22.4 kg/m².  Continues to note difficulty with procrastination, inattention, distractibility, forgetfulness has been present since childhood.She denies AVH. She denies SI and HI. She denies SH.     Past Psych History: Diagnosed with anxiety and depression per PCP.  Medication management in the past has been by primary care.  Denies any inpatient psychiatric hospitalizations or outpatient providers.  Denies a history of TBI or seizures.  Denies any knowledge of exposure to illicit substance or toxins while in utero.  Denies any knowledge of  complications.    Previous Psych Meds: Patient reports that in the past she has previously trialed Wellbutrin, Prozac, Lexapro, Celexa, and BuSpar.  Reports that she does feel she has tried other ones but cannot recall them at this time.    Substance Abuse: Patient denies a history of or present illicit substance use, nicotine.  Does report current utilization socially of " alcohol.    Social History: Patient was born and raised in [place] moving to [place] during high school.  Parents .  1 sister.  High school and college graduate.  Employed as a .  Previous marriage and .  1 daughter age 6.  Denies any incarcerations or previous  service.    Family Psychiatric History:  family history includes Breast cancer in her maternal grandmother; Heart attack in her father; Heart disease in her father; Stroke in her father.    Medical/Surgical History:  Past Medical History:   Diagnosis Date    Acid reflux     Anxiety     Elevated cholesterol     Hypertension     Migraine     Migraines     Urinary tract infection      Past Surgical History:   Procedure Laterality Date     SECTION Bilateral 2016    Procedure:  SECTION PRIMARY;  Surgeon: Valdez Bach DO;  Location: [facility] LABOR DELIVERY;  Service:        Allergies   Allergen Reactions    Ceclor [Cefaclor] Rash     childhood           Current Medications:   Current Outpatient Medications   Medication Sig Dispense Refill    propranolol (INDERAL) 20 MG tablet Take 1 tablet by mouth 2 (Two) Times a Day for 60 days. 60 tablet 1    ARIPiprazole (Abilify) 5 MG tablet Take 1 tablet by mouth Daily. 30 tablet 1    methylphenidate (Concerta) 18 MG CR tablet Take 1 tablet by mouth Daily for 7 days 7 tablet 0    traZODone (DESYREL) 100 MG tablet Take 2 tablets by mouth Every Night for 60 days. 60 tablet 1     No current facility-administered medications for this visit.         Review of Systems   Constitutional:  Positive for activity change, appetite change and fatigue.   HENT: Negative.     Eyes: Negative.    Respiratory: Negative.     Cardiovascular: Negative.    Gastrointestinal: Negative.    Endocrine: Negative.    Genitourinary: Negative.    Musculoskeletal: Negative.    Skin: Negative.    Allergic/Immunologic: Negative.    Neurological: Negative.   "  Hematological: Negative.    Psychiatric/Behavioral:  Positive for decreased concentration, dysphoric mood and sleep disturbance. Negative for self-injury and suicidal ideas. The patient is not nervous/anxious.     denies HEENT, cardiovascular, respiratory, liver, renal, GI/, endocrine, neuro, DERM, hematology, immunology, musculoskeletal disorders.    Objective   Physical Exam  Vitals reviewed.   Constitutional:       Appearance: Normal appearance.   HENT:      Head: Normocephalic.      Nose: Nose normal.      Mouth/Throat:      Mouth: Mucous membranes are moist.      Pharynx: Oropharynx is clear.   Eyes:      Extraocular Movements: Extraocular movements intact.      Pupils: Pupils are equal, round, and reactive to light.   Pulmonary:      Effort: Pulmonary effort is normal.   Musculoskeletal:         General: Normal range of motion.      Cervical back: Normal range of motion.   Skin:     General: Skin is warm and dry.   Neurological:      General: No focal deficit present.      Mental Status: She is alert and oriented to person, place, and time.   Psychiatric:         Attention and Perception: Attention and perception normal. She is attentive.         Mood and Affect: Mood is depressed. Mood is not anxious. Affect is not tearful.         Speech: Speech normal.         Behavior: Behavior normal. Behavior is cooperative.         Thought Content: Thought content normal.         Cognition and Memory: Cognition and memory normal.         Judgment: Judgment normal. Judgment is not impulsive.     Blood pressure 118/87, pulse 91, temperature 98.2 °F (36.8 °C), height 165.1 cm (65\"), weight 61.1 kg (134 lb 9.6 oz), SpO2 96%, not currently breastfeeding.    Mental Status Exam:   Hygiene:   fair  Cooperation:  Cooperative  Eye Contact:  Fair  Psychomotor Behavior:  Appropriate  Affect:  Restricted and at times tearful  Hopelessness: Optimistic  Speech:  Normal  Thought Process:  Goal directed and Linear  Thought Content:  " Normal and Mood congruent  Suicidal:  None  Homicidal:  None  Hallucinations:  None  Delusion:  None  Memory:  Intact  Orientation:  Person, Place, Time, and Situation  Reliability:  fair  Insight:  Fair  Judgement:  Fair  Impulse Control:  Fair  Physical/Medical Issues:  No       Short-term goals: Patient will be compliant with clinic appointments.  Patient will be engaged in therapy, medication compliant with minimal side effects. Patient  will report decrease of symptoms and frequency.    Long-term goals: Patient will have minimal symptoms of  with continued medication management. Patient will be compliant with treatment and appointments.     Strengths: Motivation for treatment, insight  Weaknesses: Coping skills, support    Prognosis: Guarded dependent on medication/follow up and treatment plan compliance.  Functional Status:  severe impairment in areas of daily functioning.      Assessment & Plan   Diagnoses and all orders for this visit:    1. Severe episode of recurrent major depressive disorder, without psychotic features (Primary)    2. Attention deficit hyperactivity disorder (ADHD), unspecified ADHD type  -     methylphenidate (Concerta) 18 MG CR tablet; Take 1 tablet by mouth Daily for 7 days  Dispense: 7 tablet; Refill: 0    3. Generalized anxiety disorder  -     propranolol (INDERAL) 20 MG tablet; Take 1 tablet by mouth 2 (Two) Times a Day for 60 days.  Dispense: 60 tablet; Refill: 1    4. Post traumatic stress disorder (PTSD)        -UDS collected and pending  -Ramirez reviewed nonconcerning  -Continue trazodone 200 mg nightly for sleep  -Continue Abilify 5 mg p.o. daily for depression  -Continue propranolol 20 mg p.o. twice daily for anxiety  -Start Concerta 18 mg PO QAM X7 days for attention, focus  -Medications sent to pharmacy at this time    Discussed medication and psychotherapy options. Continue propanolol, Abilify, and trazodone.  Starting Concerta x 7 days, patient is to call back this time  next week to give me an update on how she is feeling and toleration of medication.Patient was instructed to keep medication in secure place.  The use of energy drinks and decongestants while taking the medication was discouraged.  Informed the medication has abuse potential and can be habit forming was discussed. The dangers of stimulant use including elevated heart rate and blood pressure was disclosed.   Patient verbalized understanding and wishes to proceed. As part of the patient's treatment plan they are being prescribed a controlled substance with potential for abuse.  The patient has been made aware of the appropriate use of such medications,  It has been made clear these medications are for use by the patient only, without concomitant use of alcohol or other substances unless prescribed/advised by medical provider. Patient has completed prescribing agreement detailing term of continued prescribing of controlled substances including monitoring TWYLA reports, urine drug screens, and pill counts.  The patient is aware TWYLA reports are reviewed on a regular basis and scanned into the chart. Patient is aware the medication has abuse potential.   Reiterated with patient side effects of each medication individually as well as in combination.Patient reported verbalized understanding of instructionsdiscussed the risks, benefits, and side effects of the medication; client acknowledged and verbally consented.  Patient is aware to contact the [facility] with any worsening of symptom.  Patient is agreeable to go to the ER or call 911 should they begin SI/HI.      Return in about 4 weeks (around 4/18/2024), or if symptoms worsen or fail to improve, for Next scheduled follow up.        This document has been electronically signed by KANDY Reyes   March 21, 2024 18:18 EDT     Errors in dictation may reflect use of voice recognition software and not all errors in transcription may have been detected prior to  signing.

## 2024-03-25 ENCOUNTER — TELEPHONE (OUTPATIENT)
Dept: FAMILY MEDICINE CLINIC | Facility: CLINIC | Age: 34
End: 2024-03-25
Payer: COMMERCIAL

## 2024-03-25 DIAGNOSIS — F90.9 ATTENTION DEFICIT HYPERACTIVITY DISORDER (ADHD), UNSPECIFIED ADHD TYPE: ICD-10-CM

## 2024-03-25 RX ORDER — METHYLPHENIDATE HYDROCHLORIDE 27 MG/1
27 TABLET ORAL DAILY
Qty: 7 TABLET | Refills: 0 | Status: SHIPPED | OUTPATIENT
Start: 2024-03-28 | End: 2024-03-29 | Stop reason: SDUPTHER

## 2024-03-25 NOTE — TELEPHONE ENCOUNTER
Dominique called wanted to give you an update on her new meds.  She states there is no change in her symptoms      Please advise

## 2024-03-28 ENCOUNTER — TELEPHONE (OUTPATIENT)
Dept: FAMILY MEDICINE CLINIC | Facility: CLINIC | Age: 34
End: 2024-03-28
Payer: COMMERCIAL

## 2024-03-28 NOTE — TELEPHONE ENCOUNTER
[facility] called indicating insurance will only pay for generic Concerta but they can not get it in stock.  Please advise.

## 2024-03-29 DIAGNOSIS — F90.9 ATTENTION DEFICIT HYPERACTIVITY DISORDER (ADHD), UNSPECIFIED ADHD TYPE: ICD-10-CM

## 2024-03-29 RX ORDER — METHYLPHENIDATE HYDROCHLORIDE 36 MG/1
36 TABLET ORAL DAILY
Qty: 14 TABLET | Refills: 0 | Status: SHIPPED | OUTPATIENT
Start: 2024-03-29 | End: 2024-04-12

## 2024-03-29 NOTE — TELEPHONE ENCOUNTER
Patient called back indicating the extended release in 36 mg is available at [facility].  Provider notified and will send later today.

## 2024-03-29 NOTE — TELEPHONE ENCOUNTER
Patient called indicating she was concerned because the pharmacy is unable to get Methylphenidate.  Provider was notified and asked if it is available in 36 mg dose, per pharmacy neither strength is available.  I have notified patient to call and ask local pharmacies if they have it in stock and to notify me if she finds it.  Otherwise per provider, there is nothing we can do.    Patient verbalized understanding.

## 2024-04-09 DIAGNOSIS — F90.9 ATTENTION DEFICIT HYPERACTIVITY DISORDER (ADHD), UNSPECIFIED ADHD TYPE: ICD-10-CM

## 2024-04-09 RX ORDER — METHYLPHENIDATE HYDROCHLORIDE 36 MG/1
36 TABLET ORAL DAILY
Qty: 14 TABLET | Refills: 0 | Status: SHIPPED | OUTPATIENT
Start: 2024-04-11 | End: 2024-04-25

## 2024-04-25 ENCOUNTER — OFFICE VISIT (OUTPATIENT)
Dept: PSYCHIATRY | Facility: CLINIC | Age: 34
End: 2024-04-25
Payer: COMMERCIAL

## 2024-04-25 ENCOUNTER — TELEPHONE (OUTPATIENT)
Dept: FAMILY MEDICINE CLINIC | Facility: CLINIC | Age: 34
End: 2024-04-25
Payer: COMMERCIAL

## 2024-04-25 VITALS
BODY MASS INDEX: 21.83 KG/M2 | WEIGHT: 131 LBS | HEART RATE: 112 BPM | HEIGHT: 65 IN | SYSTOLIC BLOOD PRESSURE: 125 MMHG | DIASTOLIC BLOOD PRESSURE: 94 MMHG | OXYGEN SATURATION: 96 % | TEMPERATURE: 98.6 F

## 2024-04-25 DIAGNOSIS — F33.2 SEVERE EPISODE OF RECURRENT MAJOR DEPRESSIVE DISORDER, WITHOUT PSYCHOTIC FEATURES: ICD-10-CM

## 2024-04-25 DIAGNOSIS — F90.9 ATTENTION DEFICIT HYPERACTIVITY DISORDER (ADHD), UNSPECIFIED ADHD TYPE: ICD-10-CM

## 2024-04-25 DIAGNOSIS — F41.1 GENERALIZED ANXIETY DISORDER: ICD-10-CM

## 2024-04-25 DIAGNOSIS — G47.9 SLEEPING DIFFICULTY: ICD-10-CM

## 2024-04-25 DIAGNOSIS — F43.10 POST TRAUMATIC STRESS DISORDER (PTSD): ICD-10-CM

## 2024-04-25 PROCEDURE — 99214 OFFICE O/P EST MOD 30 MIN: CPT

## 2024-04-25 RX ORDER — METHYLPHENIDATE HYDROCHLORIDE 54 MG/1
54 TABLET ORAL DAILY
Qty: 30 TABLET | Refills: 0 | Status: SHIPPED | OUTPATIENT
Start: 2024-04-25 | End: 2024-04-25

## 2024-04-25 RX ORDER — TRAZODONE HYDROCHLORIDE 100 MG/1
200 TABLET ORAL NIGHTLY
Qty: 60 TABLET | Refills: 1 | Status: SHIPPED | OUTPATIENT
Start: 2024-04-25 | End: 2024-06-24

## 2024-04-25 RX ORDER — PROPRANOLOL HCL 60 MG
60 CAPSULE, EXTENDED RELEASE 24HR ORAL DAILY
Qty: 30 CAPSULE | Refills: 1 | Status: SHIPPED | OUTPATIENT
Start: 2024-04-25 | End: 2024-06-24

## 2024-04-25 RX ORDER — ARIPIPRAZOLE 5 MG/1
5 TABLET ORAL DAILY
Qty: 30 TABLET | Refills: 1 | Status: SHIPPED | OUTPATIENT
Start: 2024-04-25

## 2024-04-25 RX ORDER — METHYLPHENIDATE HYDROCHLORIDE 54 MG/1
54 TABLET ORAL DAILY
Qty: 30 TABLET | Refills: 0 | Status: SHIPPED | OUTPATIENT
Start: 2024-04-25 | End: 2024-05-25

## 2024-04-25 NOTE — PROGRESS NOTES
"Subjective   Dominique De Dios is a 33 y.o. female who is here today for medication management follow up encounter.       Chief Complaint:  anxiety, depression,attention/focus deficits.    HPI:  History of Present Illness    Patient denies negative side effects associated with current regimen.  Patient was increased to Concerta 36 mg p.o. every morning between encounters associated with drug availability.  Patient reports that she has had an increase in energy, reduction in sadness, improved motivation.  She reports that she feels that she is \"finally starting to come myself.\"  Irritability has reduced.  Attention and focus is doing much better.  She reports that she has been doing a lot of thinking and feels that the low motivation, procrastination, distractibility, etc. has been a huge factor in depression for her in general.  Does not dread work as much.  Continues to have difficulty with sleep but not worsened with medication.  No change with appetite.  Body mass index is 21.8 kg/m².Feels that anxiety has been manageable.She denies AVH. She denies SI and HI. She denies SH.     Past Psych History: Diagnosed with anxiety and depression per PCP.  Medication management in the past has been by primary care.  Denies any inpatient psychiatric hospitalizations or outpatient providers.  Denies a history of TBI or seizures.  Denies any knowledge of exposure to illicit substance or toxins while in utero.  Denies any knowledge of  complications.    Previous Psych Meds: Patient reports that in the past she has previously trialed Wellbutrin, Prozac, Lexapro, Celexa, and BuSpar.  Reports that she does feel she has tried other ones but cannot recall them at this time.    Substance Abuse: Patient denies a history of or present illicit substance use, nicotine.  Does report current utilization socially of alcohol.    Social History: Patient was born and raised in [place] [facility] moving to [place] [place] [facility] [employer] during high " school.  Parents .  1 sister.  High school and college graduate.  Employed as a .  Previous marriage and .  1 daughter age 6.  Denies any incarcerations or previous  service.    Family Psychiatric History:  family history includes Breast cancer in her maternal grandmother; Heart attack in her father; Heart disease in her father; Stroke in her father.    Medical/Surgical History:  Past Medical History:   Diagnosis Date    Acid reflux     Anxiety     Elevated cholesterol     Hypertension     Migraine     Migraines     Urinary tract infection      Past Surgical History:   Procedure Laterality Date     SECTION Bilateral 2016    Procedure:  SECTION PRIMARY;  Surgeon: Valdez Bach DO;  Location: [facility] LABOR DELIVERY;  Service:        Allergies   Allergen Reactions    Ceclor [Cefaclor] Rash     childhood           Current Medications:   Current Outpatient Medications   Medication Sig Dispense Refill    ARIPiprazole (Abilify) 5 MG tablet Take 1 tablet by mouth Daily. 30 tablet 1    traZODone (DESYREL) 100 MG tablet Take 2 tablets by mouth Every Night for 60 days. 60 tablet 1    methylphenidate (Concerta) 54 MG CR tablet Take 1 tablet by mouth Daily for 30 days 30 tablet 0    propranolol LA (Inderal LA) 60 MG 24 hr capsule Take 1 capsule by mouth Daily for 60 days. 30 capsule 1     No current facility-administered medications for this visit.         Review of Systems   Constitutional:  Positive for activity change, appetite change and fatigue.   HENT: Negative.     Eyes: Negative.    Respiratory: Negative.     Cardiovascular: Negative.    Gastrointestinal: Negative.    Endocrine: Negative.    Genitourinary: Negative.    Musculoskeletal: Negative.    Skin: Negative.    Allergic/Immunologic: Negative.    Neurological: Negative.    Hematological: Negative.    Psychiatric/Behavioral:  Positive for decreased concentration, dysphoric mood and sleep  "disturbance. Negative for self-injury and suicidal ideas. The patient is not nervous/anxious.     denies HEENT, cardiovascular, respiratory, liver, renal, GI/, endocrine, neuro, DERM, hematology, immunology, musculoskeletal disorders.    Objective   Physical Exam  Vitals reviewed.   Constitutional:       Appearance: Normal appearance.   HENT:      Head: Normocephalic.      Nose: Nose normal.      Mouth/Throat:      Mouth: Mucous membranes are moist.      Pharynx: Oropharynx is clear.   Eyes:      Extraocular Movements: Extraocular movements intact.      Pupils: Pupils are equal, round, and reactive to light.   Pulmonary:      Effort: Pulmonary effort is normal.   Musculoskeletal:         General: Normal range of motion.      Cervical back: Normal range of motion.   Skin:     General: Skin is warm and dry.   Neurological:      General: No focal deficit present.      Mental Status: She is alert and oriented to person, place, and time.   Psychiatric:         Attention and Perception: Attention and perception normal. She is attentive.         Mood and Affect: Mood is depressed. Mood is not anxious. Affect is not tearful.         Speech: Speech normal.         Behavior: Behavior normal. Behavior is cooperative.         Thought Content: Thought content normal.         Cognition and Memory: Cognition and memory normal.         Judgment: Judgment normal. Judgment is not impulsive.     Blood pressure 125/94, pulse 112, temperature 98.6 °F (37 °C), height 165.1 cm (65\"), weight 59.4 kg (131 lb), SpO2 96%, not currently breastfeeding.    Mental Status Exam:   Hygiene:   fair  Cooperation:  Cooperative  Eye Contact:  Fair  Psychomotor Behavior:  Appropriate  Affect: Appropriate  Hopelessness: Optimistic  Speech:  Normal  Thought Process:  Goal directed and Linear  Thought Content:  Normal and Mood congruent  Suicidal:  None  Homicidal:  None  Hallucinations:  None  Delusion:  None  Memory:  Intact  Orientation:  Person, " Place, Time, and Situation  Reliability:  fair  Insight:  Fair  Judgement:  Fair  Impulse Control:  Fair  Physical/Medical Issues:  No       Short-term goals: Patient will be compliant with clinic appointments.  Patient will be engaged in therapy, medication compliant with minimal side effects. Patient  will report decrease of symptoms and frequency.    Long-term goals: Patient will have minimal symptoms of  with continued medication management. Patient will be compliant with treatment and appointments.     Strengths: Motivation for treatment, insight  Weaknesses: Coping skills, support    Prognosis: Guarded dependent on medication/follow up and treatment plan compliance.  Functional Status:  severe impairment in areas of daily functioning.      Assessment & Plan   Diagnoses and all orders for this visit:    1. Attention deficit hyperactivity disorder (ADHD), unspecified ADHD type  -     Discontinue: methylphenidate (Concerta) 54 MG CR tablet; Take 1 tablet by mouth Daily for 30 days  Dispense: 30 tablet; Refill: 0  -     methylphenidate (Concerta) 54 MG CR tablet; Take 1 tablet by mouth Daily for 30 days  Dispense: 30 tablet; Refill: 0    2. Severe episode of recurrent major depressive disorder, without psychotic features  -     Discontinue: methylphenidate (Concerta) 54 MG CR tablet; Take 1 tablet by mouth Daily for 30 days  Dispense: 30 tablet; Refill: 0  -     ARIPiprazole (Abilify) 5 MG tablet; Take 1 tablet by mouth Daily.  Dispense: 30 tablet; Refill: 1  -     traZODone (DESYREL) 100 MG tablet; Take 2 tablets by mouth Every Night for 60 days.  Dispense: 60 tablet; Refill: 1  -     methylphenidate (Concerta) 54 MG CR tablet; Take 1 tablet by mouth Daily for 30 days  Dispense: 30 tablet; Refill: 0    3. Post traumatic stress disorder (PTSD)  -     propranolol LA (Inderal LA) 60 MG 24 hr capsule; Take 1 capsule by mouth Daily for 60 days.  Dispense: 30 capsule; Refill: 1  -     ARIPiprazole (Abilify) 5 MG  tablet; Take 1 tablet by mouth Daily.  Dispense: 30 tablet; Refill: 1  -     traZODone (DESYREL) 100 MG tablet; Take 2 tablets by mouth Every Night for 60 days.  Dispense: 60 tablet; Refill: 1    4. Generalized anxiety disorder  -     propranolol LA (Inderal LA) 60 MG 24 hr capsule; Take 1 capsule by mouth Daily for 60 days.  Dispense: 30 capsule; Refill: 1  -     ARIPiprazole (Abilify) 5 MG tablet; Take 1 tablet by mouth Daily.  Dispense: 30 tablet; Refill: 1  -     traZODone (DESYREL) 100 MG tablet; Take 2 tablets by mouth Every Night for 60 days.  Dispense: 60 tablet; Refill: 1    5. Sleeping difficulty  -     traZODone (DESYREL) 100 MG tablet; Take 2 tablets by mouth Every Night for 60 days.  Dispense: 60 tablet; Refill: 1          -Ramirez reviewed and appropriate  -UDS at next encounter  -Continue trazodone 200 mg nightly for sleep  -Continue Abilify 5 mg p.o. daily for depression  -Discontinue Inderal  -Start Inderal LA 60 mg p.o. daily for anxiety  -Increase Concerta to 54 mg p.o. every morning  -Medications sent to pharmacy at this time    Discussed medication and psychotherapy options.  Plan at this time will be to continue trazodone and Abilify without change.  Increasing Concerta as described above for attention and focus deficits.  Transitioning from Inderal to Inderal LA for anxiety.  Hopeful that transitioning to Inderal LA will help reduce dosing frequency throughout the day and aid in medication compliance. Discussed the risks, benefits, and side effects of the medication; client acknowledged and verbally consented.  Patient is aware to contact the [facility] with any worsening of symptom.  Patient is agreeable to go to the ER or call 911 should they begin SI/HI.      Return in about 4 weeks (around 5/23/2024), or if symptoms worsen or fail to improve, for Next scheduled follow up.        This document has been electronically signed by KANDY Reyes   April 25, 2024 17:42 EDT     Errors  in dictation may reflect use of voice recognition software and not all errors in transcription may have been detected prior to signing.

## 2024-04-25 NOTE — TELEPHONE ENCOUNTER
Pt came in requesting the generic concerta be sent to [facility] because [facility] didn't have it and the propranolol 60mg stays at [facility]

## 2024-05-20 DIAGNOSIS — F90.9 ATTENTION DEFICIT HYPERACTIVITY DISORDER (ADHD), UNSPECIFIED ADHD TYPE: ICD-10-CM

## 2024-05-20 DIAGNOSIS — F33.2 SEVERE EPISODE OF RECURRENT MAJOR DEPRESSIVE DISORDER, WITHOUT PSYCHOTIC FEATURES: ICD-10-CM

## 2024-05-20 RX ORDER — METHYLPHENIDATE HYDROCHLORIDE 54 MG/1
54 TABLET ORAL DAILY
Qty: 30 TABLET | Refills: 0 | Status: SHIPPED | OUTPATIENT
Start: 2024-05-24 | End: 2024-05-21 | Stop reason: SDUPTHER

## 2024-05-21 ENCOUNTER — TELEPHONE (OUTPATIENT)
Dept: FAMILY MEDICINE CLINIC | Facility: CLINIC | Age: 34
End: 2024-05-21
Payer: COMMERCIAL

## 2024-05-21 DIAGNOSIS — F90.9 ATTENTION DEFICIT HYPERACTIVITY DISORDER (ADHD), UNSPECIFIED ADHD TYPE: ICD-10-CM

## 2024-05-21 DIAGNOSIS — F33.2 SEVERE EPISODE OF RECURRENT MAJOR DEPRESSIVE DISORDER, WITHOUT PSYCHOTIC FEATURES: ICD-10-CM

## 2024-05-21 RX ORDER — METHYLPHENIDATE HYDROCHLORIDE 54 MG/1
54 TABLET ORAL DAILY
Qty: 30 TABLET | Refills: 0 | Status: SHIPPED | OUTPATIENT
Start: 2024-05-23 | End: 2024-06-22

## 2024-05-21 NOTE — TELEPHONE ENCOUNTER
[facility] is out and can you do it for Thursday.  She is leaving early Friday morning

## 2024-06-04 ENCOUNTER — OFFICE VISIT (OUTPATIENT)
Dept: PSYCHIATRY | Facility: CLINIC | Age: 34
End: 2024-06-04
Payer: COMMERCIAL

## 2024-06-04 VITALS
HEIGHT: 65 IN | TEMPERATURE: 98 F | DIASTOLIC BLOOD PRESSURE: 83 MMHG | WEIGHT: 125.2 LBS | OXYGEN SATURATION: 97 % | SYSTOLIC BLOOD PRESSURE: 116 MMHG | HEART RATE: 88 BPM | BODY MASS INDEX: 20.86 KG/M2

## 2024-06-04 DIAGNOSIS — F90.9 ATTENTION DEFICIT HYPERACTIVITY DISORDER (ADHD), UNSPECIFIED ADHD TYPE: ICD-10-CM

## 2024-06-04 DIAGNOSIS — F43.10 POST TRAUMATIC STRESS DISORDER (PTSD): ICD-10-CM

## 2024-06-04 DIAGNOSIS — F41.1 GENERALIZED ANXIETY DISORDER: ICD-10-CM

## 2024-06-04 DIAGNOSIS — F33.41 RECURRENT MAJOR DEPRESSIVE DISORDER, IN PARTIAL REMISSION: Primary | ICD-10-CM

## 2024-06-04 DIAGNOSIS — G47.9 SLEEPING DIFFICULTY: ICD-10-CM

## 2024-06-04 PROCEDURE — 99214 OFFICE O/P EST MOD 30 MIN: CPT

## 2024-06-04 RX ORDER — CLONIDINE HYDROCHLORIDE 0.1 MG/1
0.1 TABLET ORAL NIGHTLY
Qty: 90 TABLET | Refills: 0 | Status: SHIPPED | OUTPATIENT
Start: 2024-06-04 | End: 2024-06-05 | Stop reason: SDUPTHER

## 2024-06-04 RX ORDER — PROPRANOLOL HCL 60 MG
60 CAPSULE, EXTENDED RELEASE 24HR ORAL DAILY
Qty: 90 CAPSULE | Refills: 0 | Status: SHIPPED | OUTPATIENT
Start: 2024-06-04 | End: 2024-06-05 | Stop reason: SDUPTHER

## 2024-06-04 RX ORDER — ARIPIPRAZOLE 5 MG/1
5 TABLET ORAL DAILY
Qty: 90 TABLET | Refills: 0 | Status: SHIPPED | OUTPATIENT
Start: 2024-06-04 | End: 2024-06-05 | Stop reason: SDUPTHER

## 2024-06-04 RX ORDER — TRAZODONE HYDROCHLORIDE 100 MG/1
200 TABLET ORAL NIGHTLY
Qty: 180 TABLET | Refills: 0 | Status: SHIPPED | OUTPATIENT
Start: 2024-06-04 | End: 2024-06-05 | Stop reason: SDUPTHER

## 2024-06-04 NOTE — PROGRESS NOTES
Subjective   Dominique De Dios is a 34 y.o. female who is here today for medication management follow up encounter.       Chief Complaint:  anxiety, depression,attention/focus deficits.    HPI:  History of Present Illness  Negative side effects associated with current regimen.  She reports that overall she feels that she is doing much better.  Feels that depression has significantly improved.  Feels that anxiety is manageable.  Feels that it is better after she takes her Concerta in the morning.  Reports that anxiety in the mornings is often associated with task at hand and things that she needs to do.  Sleep initiation is still difficult.  Averaging about 5 hours per night.  No reported change with appetite. Body mass index is 20.83 kg/m².  Reports that she has been able to travel and go on vacation which was very helpful for her.  She denies AVH. She denies SI and HI. She denies SH.     Past Psych History: Diagnosed with anxiety and depression per PCP.  Medication management in the past has been by primary care.  Denies any inpatient psychiatric hospitalizations or outpatient providers.  Denies a history of TBI or seizures.  Denies any knowledge of exposure to illicit substance or toxins while in utero.  Denies any knowledge of  complications.    Previous Psych Meds: Patient reports that in the past she has previously trialed Wellbutrin, Prozac, Lexapro, Celexa, and BuSpar.  Reports that she does feel she has tried other ones but cannot recall them at this time.    Substance Abuse: Patient denies a history of or present illicit substance use, nicotine.  Does report current utilization socially of alcohol.    Social History: Patient was born and raised in [place] [facility] moving to [place] during high school.  Parents .  1 sister.  High school and college graduate.  Employed as a .  Previous marriage and .  1 daughter age 6.  Denies any incarcerations or  previous  service.    Family Psychiatric History:  family history includes Breast cancer in her maternal grandmother; Heart attack in her father; Heart disease in her father; Stroke in her father.    Medical/Surgical History:  Past Medical History:   Diagnosis Date    Acid reflux     Anxiety     Elevated cholesterol     Hypertension     Migraine     Migraines     Urinary tract infection      Past Surgical History:   Procedure Laterality Date     SECTION Bilateral 2016    Procedure:  SECTION PRIMARY;  Surgeon: Valdez Bach DO;  Location: [facility] LABOR DELIVERY;  Service:        Allergies   Allergen Reactions    Ceclor [Cefaclor] Rash     childhood           Current Medications:   Current Outpatient Medications   Medication Sig Dispense Refill    ARIPiprazole (Abilify) 5 MG tablet Take 1 tablet by mouth Daily for 90 days. 90 tablet 0    propranolol LA (Inderal LA) 60 MG 24 hr capsule Take 1 capsule by mouth Daily for 90 days. 90 capsule 0    traZODone (DESYREL) 100 MG tablet Take 2 tablets by mouth Every Night for 90 days. 180 tablet 0    cloNIDine (Catapres) 0.1 MG tablet Take 1 tablet by mouth Every Night for 90 days. 90 tablet 0    methylphenidate (Concerta) 54 MG CR tablet Take 1 tablet by mouth Daily for 30 days 30 tablet 0     No current facility-administered medications for this visit.         Review of Systems   Constitutional:  Positive for activity change, appetite change and fatigue.        Improving    HENT: Negative.     Eyes: Negative.    Respiratory: Negative.     Cardiovascular: Negative.    Gastrointestinal: Negative.    Endocrine: Negative.    Genitourinary: Negative.    Musculoskeletal: Negative.    Skin: Negative.    Allergic/Immunologic: Negative.    Neurological: Negative.    Hematological: Negative.    Psychiatric/Behavioral:  Positive for decreased concentration, dysphoric mood and sleep disturbance. Negative for self-injury and suicidal ideas. The patient  "is nervous/anxious.         Improving    denies HEENT, cardiovascular, respiratory, liver, renal, GI/, endocrine, neuro, DERM, hematology, immunology, musculoskeletal disorders.    Objective   Physical Exam  Vitals reviewed.   Constitutional:       Appearance: Normal appearance.   HENT:      Head: Normocephalic.      Nose: Nose normal.      Mouth/Throat:      Mouth: Mucous membranes are moist.      Pharynx: Oropharynx is clear.   Eyes:      Extraocular Movements: Extraocular movements intact.      Pupils: Pupils are equal, round, and reactive to light.   Pulmonary:      Effort: Pulmonary effort is normal.   Musculoskeletal:         General: Normal range of motion.      Cervical back: Normal range of motion.   Skin:     General: Skin is warm and dry.   Neurological:      General: No focal deficit present.      Mental Status: She is alert and oriented to person, place, and time.   Psychiatric:         Attention and Perception: Attention and perception normal. She is attentive.         Mood and Affect: Affect normal. Mood is anxious. Mood is not depressed. Affect is not tearful.         Speech: Speech normal.         Behavior: Behavior normal. Behavior is cooperative.         Thought Content: Thought content normal.         Cognition and Memory: Cognition and memory normal.         Judgment: Judgment normal. Judgment is not impulsive.     Blood pressure 116/83, pulse 88, temperature 98 °F (36.7 °C), height 165.1 cm (65\"), weight 56.8 kg (125 lb 3.2 oz), SpO2 97%, not currently breastfeeding.    Mental Status Exam:   Hygiene:   fair  Cooperation:  Cooperative  Eye Contact:  Fair  Psychomotor Behavior:  Appropriate  Affect: Appropriate  Hopelessness: Optimistic  Speech:  Normal  Thought Process:  Goal directed and Linear  Thought Content:  Normal and Mood congruent  Suicidal:  None  Homicidal:  None  Hallucinations:  None  Delusion:  None  Memory:  Intact  Orientation:  Person, Place, Time, and Situation  Reliability:  " fair  Insight:  Fair  Judgement:  Fair  Impulse Control:  Fair  Physical/Medical Issues:  No       Short-term goals: Patient will be compliant with clinic appointments.  Patient will be engaged in therapy, medication compliant with minimal side effects. Patient  will report decrease of symptoms and frequency.    Long-term goals: Patient will have minimal symptoms of  with continued medication management. Patient will be compliant with treatment and appointments.     Strengths: Motivation for treatment, insight  Weaknesses: Coping skills, support    Prognosis: Guarded dependent on medication/follow up and treatment plan compliance.  Functional Status:  severe impairment in areas of daily functioning.      Assessment & Plan   Diagnoses and all orders for this visit:    1. Recurrent major depressive disorder, in partial remission (Primary)  -     traZODone (DESYREL) 100 MG tablet; Take 2 tablets by mouth Every Night for 90 days.  Dispense: 180 tablet; Refill: 0  -     ARIPiprazole (Abilify) 5 MG tablet; Take 1 tablet by mouth Daily for 90 days.  Dispense: 90 tablet; Refill: 0    2. Post traumatic stress disorder (PTSD)  -     traZODone (DESYREL) 100 MG tablet; Take 2 tablets by mouth Every Night for 90 days.  Dispense: 180 tablet; Refill: 0  -     propranolol LA (Inderal LA) 60 MG 24 hr capsule; Take 1 capsule by mouth Daily for 90 days.  Dispense: 90 capsule; Refill: 0  -     ARIPiprazole (Abilify) 5 MG tablet; Take 1 tablet by mouth Daily for 90 days.  Dispense: 90 tablet; Refill: 0    3. Generalized anxiety disorder  -     traZODone (DESYREL) 100 MG tablet; Take 2 tablets by mouth Every Night for 90 days.  Dispense: 180 tablet; Refill: 0  -     propranolol LA (Inderal LA) 60 MG 24 hr capsule; Take 1 capsule by mouth Daily for 90 days.  Dispense: 90 capsule; Refill: 0  -     ARIPiprazole (Abilify) 5 MG tablet; Take 1 tablet by mouth Daily for 90 days.  Dispense: 90 tablet; Refill: 0    4. Sleeping difficulty  -      traZODone (DESYREL) 100 MG tablet; Take 2 tablets by mouth Every Night for 90 days.  Dispense: 180 tablet; Refill: 0    5. Attention deficit hyperactivity disorder (ADHD), unspecified ADHD type  -     cloNIDine (Catapres) 0.1 MG tablet; Take 1 tablet by mouth Every Night for 90 days.  Dispense: 90 tablet; Refill: 0      -UDS collected and pending  -Ramirez reviewed and appropriate  -Continue trazodone 200 mg nightly for sleep  -Continue Abilify 5 mg p.o. daily for depression  -Continue Inderal LA 60 mg daily for anxiety   -Continue Concerta 54 mg p.o. every morning for ADHD  -Start clonidine 0.1 mg PO QHS for ADHD   -Medications sent to pharmacy at this time    Discussed medication and psychotherapy options. Patient is to continue Concerta, trazodone, Inderal LA, Abilify. Starting clonidine as described above. Discussed increased risk of bradycardia and hypotension with combined antihypertensive regimen.  Reiterated side effects that can be seen with each medication individually and in combination. Discussed the risks, benefits, and side effects of the medication; client acknowledged and verbally consented.  Patient is aware to contact the [facility] with any worsening of symptom.  Patient is agreeable to go to the ER or call 911 should they begin SI/HI.      No follow-ups on file.        This document has been electronically signed by KANDY Reyes   June 4, 2024 16:15 EDT     Errors in dictation may reflect use of voice recognition software and not all errors in transcription may have been detected prior to signing.

## 2024-06-05 ENCOUNTER — TELEPHONE (OUTPATIENT)
Dept: FAMILY MEDICINE CLINIC | Facility: CLINIC | Age: 34
End: 2024-06-05
Payer: COMMERCIAL

## 2024-06-05 DIAGNOSIS — G47.9 SLEEPING DIFFICULTY: ICD-10-CM

## 2024-06-05 DIAGNOSIS — F33.2 SEVERE EPISODE OF RECURRENT MAJOR DEPRESSIVE DISORDER, WITHOUT PSYCHOTIC FEATURES: ICD-10-CM

## 2024-06-05 DIAGNOSIS — F41.1 GENERALIZED ANXIETY DISORDER: ICD-10-CM

## 2024-06-05 DIAGNOSIS — F90.9 ATTENTION DEFICIT HYPERACTIVITY DISORDER (ADHD), UNSPECIFIED ADHD TYPE: ICD-10-CM

## 2024-06-05 DIAGNOSIS — F43.10 POST TRAUMATIC STRESS DISORDER (PTSD): ICD-10-CM

## 2024-06-05 DIAGNOSIS — F33.41 RECURRENT MAJOR DEPRESSIVE DISORDER, IN PARTIAL REMISSION: ICD-10-CM

## 2024-06-05 RX ORDER — METHYLPHENIDATE HYDROCHLORIDE 54 MG/1
54 TABLET ORAL DAILY
Qty: 30 TABLET | Refills: 0 | Status: SHIPPED | OUTPATIENT
Start: 2024-06-05 | End: 2024-07-05

## 2024-06-05 RX ORDER — ARIPIPRAZOLE 5 MG/1
5 TABLET ORAL DAILY
Qty: 90 TABLET | Refills: 0 | Status: SHIPPED | OUTPATIENT
Start: 2024-06-05 | End: 2024-09-03

## 2024-06-05 RX ORDER — PROPRANOLOL HCL 60 MG
60 CAPSULE, EXTENDED RELEASE 24HR ORAL DAILY
Qty: 90 CAPSULE | Refills: 0 | Status: SHIPPED | OUTPATIENT
Start: 2024-06-05 | End: 2024-09-03

## 2024-06-05 RX ORDER — TRAZODONE HYDROCHLORIDE 100 MG/1
200 TABLET ORAL NIGHTLY
Qty: 180 TABLET | Refills: 0 | Status: SHIPPED | OUTPATIENT
Start: 2024-06-05 | End: 2024-09-03

## 2024-06-05 RX ORDER — CLONIDINE HYDROCHLORIDE 0.1 MG/1
0.1 TABLET ORAL NIGHTLY
Qty: 90 TABLET | Refills: 0 | Status: SHIPPED | OUTPATIENT
Start: 2024-06-05 | End: 2024-09-03

## 2024-06-24 ENCOUNTER — TELEPHONE (OUTPATIENT)
Dept: FAMILY MEDICINE CLINIC | Facility: CLINIC | Age: 34
End: 2024-06-24
Payer: COMMERCIAL

## 2024-06-24 DIAGNOSIS — F33.2 SEVERE EPISODE OF RECURRENT MAJOR DEPRESSIVE DISORDER, WITHOUT PSYCHOTIC FEATURES: ICD-10-CM

## 2024-06-24 DIAGNOSIS — F90.9 ATTENTION DEFICIT HYPERACTIVITY DISORDER (ADHD), UNSPECIFIED ADHD TYPE: ICD-10-CM

## 2024-06-24 RX ORDER — METHYLPHENIDATE HYDROCHLORIDE 54 MG/1
54 TABLET ORAL DAILY
Qty: 30 TABLET | Refills: 0 | Status: SHIPPED | OUTPATIENT
Start: 2024-06-24 | End: 2024-07-24

## 2024-06-24 NOTE — TELEPHONE ENCOUNTER
Will you send her concerta into [facility].  She didn't fill the one from [facility] on 6/5 and I cancelled it already

## 2024-07-25 DIAGNOSIS — F90.9 ATTENTION DEFICIT HYPERACTIVITY DISORDER (ADHD), UNSPECIFIED ADHD TYPE: ICD-10-CM

## 2024-07-25 DIAGNOSIS — F33.2 SEVERE EPISODE OF RECURRENT MAJOR DEPRESSIVE DISORDER, WITHOUT PSYCHOTIC FEATURES: ICD-10-CM

## 2024-07-25 RX ORDER — METHYLPHENIDATE HYDROCHLORIDE 54 MG/1
54 TABLET ORAL DAILY
Qty: 30 TABLET | Refills: 0 | Status: SHIPPED | OUTPATIENT
Start: 2024-07-25 | End: 2024-07-29 | Stop reason: SDUPTHER

## 2024-07-26 ENCOUNTER — TELEPHONE (OUTPATIENT)
Dept: FAMILY MEDICINE CLINIC | Facility: CLINIC | Age: 34
End: 2024-07-26
Payer: COMMERCIAL

## 2024-07-26 NOTE — TELEPHONE ENCOUNTER
PATIENT CALLED IN AND SAID WE SENT HER MEDICATION (methylphenidate (Concerta) 54 MG CR tablet [[id number]] ) TO THE WRONG PHARMACY. [facility]

## 2024-07-29 DIAGNOSIS — F33.2 SEVERE EPISODE OF RECURRENT MAJOR DEPRESSIVE DISORDER, WITHOUT PSYCHOTIC FEATURES: ICD-10-CM

## 2024-07-29 DIAGNOSIS — F90.9 ATTENTION DEFICIT HYPERACTIVITY DISORDER (ADHD), UNSPECIFIED ADHD TYPE: ICD-10-CM

## 2024-07-29 RX ORDER — METHYLPHENIDATE HYDROCHLORIDE 54 MG/1
54 TABLET ORAL DAILY
Qty: 30 TABLET | Refills: 0 | Status: SHIPPED | OUTPATIENT
Start: 2024-07-29 | End: 2024-08-28

## 2024-08-26 DIAGNOSIS — F90.9 ATTENTION DEFICIT HYPERACTIVITY DISORDER (ADHD), UNSPECIFIED ADHD TYPE: ICD-10-CM

## 2024-08-26 DIAGNOSIS — F33.2 SEVERE EPISODE OF RECURRENT MAJOR DEPRESSIVE DISORDER, WITHOUT PSYCHOTIC FEATURES: ICD-10-CM

## 2024-08-26 RX ORDER — METHYLPHENIDATE HYDROCHLORIDE 54 MG/1
54 TABLET ORAL DAILY
Qty: 30 TABLET | Refills: 0 | Status: SHIPPED | OUTPATIENT
Start: 2024-08-28 | End: 2024-09-27

## 2024-09-03 ENCOUNTER — OFFICE VISIT (OUTPATIENT)
Dept: PSYCHIATRY | Facility: CLINIC | Age: 34
End: 2024-09-03
Payer: COMMERCIAL

## 2024-09-03 VITALS
BODY MASS INDEX: 19.96 KG/M2 | OXYGEN SATURATION: 98 % | HEIGHT: 65 IN | HEART RATE: 90 BPM | WEIGHT: 119.8 LBS | SYSTOLIC BLOOD PRESSURE: 121 MMHG | DIASTOLIC BLOOD PRESSURE: 89 MMHG

## 2024-09-03 DIAGNOSIS — F41.1 GENERALIZED ANXIETY DISORDER: ICD-10-CM

## 2024-09-03 DIAGNOSIS — G47.9 SLEEPING DIFFICULTY: ICD-10-CM

## 2024-09-03 DIAGNOSIS — F43.10 POST TRAUMATIC STRESS DISORDER (PTSD): ICD-10-CM

## 2024-09-03 DIAGNOSIS — F33.41 RECURRENT MAJOR DEPRESSIVE DISORDER, IN PARTIAL REMISSION: Primary | ICD-10-CM

## 2024-09-03 DIAGNOSIS — F90.9 ATTENTION DEFICIT HYPERACTIVITY DISORDER (ADHD), UNSPECIFIED ADHD TYPE: ICD-10-CM

## 2024-09-03 PROCEDURE — 99214 OFFICE O/P EST MOD 30 MIN: CPT

## 2024-09-03 RX ORDER — ARIPIPRAZOLE 5 MG/1
5 TABLET ORAL DAILY
Qty: 90 TABLET | Refills: 1 | Status: SHIPPED | OUTPATIENT
Start: 2024-09-03 | End: 2025-03-02

## 2024-09-03 RX ORDER — TRAZODONE HYDROCHLORIDE 100 MG/1
200 TABLET ORAL NIGHTLY
Qty: 180 TABLET | Refills: 1 | Status: SHIPPED | OUTPATIENT
Start: 2024-09-03 | End: 2025-03-02

## 2024-09-03 RX ORDER — PROPRANOLOL HCL 60 MG
60 CAPSULE, EXTENDED RELEASE 24HR ORAL DAILY
Qty: 90 CAPSULE | Refills: 1 | Status: SHIPPED | OUTPATIENT
Start: 2024-09-03 | End: 2025-03-02

## 2024-09-03 RX ORDER — METHYLPHENIDATE HYDROCHLORIDE 54 MG/1
54 TABLET ORAL DAILY
Qty: 30 TABLET | Refills: 0 | Status: SHIPPED | OUTPATIENT
Start: 2024-09-27 | End: 2024-10-27

## 2024-09-03 NOTE — PROGRESS NOTES
"Subjective   Dominique De Dios is a 34 y.o. female who is here today for medication management follow up encounter.     Chief Complaint:  anxiety, depression,attention/focus deficits.    HPI:  History of Present Illness    Patient reports that she tried clonidine; however, stopped it related to daytime fatigue. She reports that she has been \"doing good even with school back and I have the hardest class that I've ever had.\" She denies any depression. She denies any in anxiety. She denies any panic. Appetite is doing well. Body mass index is 19.94 kg/m².  She reports that sleep is averaging 6-7 hrs nightly. She denies any nightmares. She reports that she feels well rested. She reports that attention, focus, and impulsivity is doing well with Concerta.  She denies AVH. She denies SI and HI. She denies SH.     Past Psych History: Diagnosed with anxiety and depression per PCP.  Medication management in the past has been by primary care.  Denies any inpatient psychiatric hospitalizations or outpatient providers.  Denies a history of TBI or seizures.  Denies any knowledge of exposure to illicit substance or toxins while in utero.  Denies any knowledge of  complications.    Previous Psych Meds: Patient reports that in the past she has previously trialed Wellbutrin, Prozac, Lexapro, Celexa, and BuSpar.  Reports that she does feel she has tried other ones but cannot recall them at this time.    Substance Abuse: Patient denies a history of or present illicit substance use, nicotine.  Does report current utilization socially of alcohol.    Social History: Patient was born and raised in [place] moving to [place] during high school.  Parents .  1 sister.  High school and college graduate.  Employed as a .  Previous marriage and .  1 daughter age 6.  Denies any incarcerations or previous  service.    Family Psychiatric History:  family history includes " Breast cancer in her maternal grandmother; Heart attack in her father; Heart disease in her father; Stroke in her father.    Medical/Surgical History:  Past Medical History:   Diagnosis Date    Acid reflux     Anxiety     Elevated cholesterol     Hypertension     Migraine     Migraines     Urinary tract infection      Past Surgical History:   Procedure Laterality Date     SECTION Bilateral 2016    Procedure:  SECTION PRIMARY;  Surgeon: Valdez Bach DO;  Location: [facility] LABOR DELIVERY;  Service:        Allergies   Allergen Reactions    Ceclor [Cefaclor] Rash     childhood           Current Medications:   Current Outpatient Medications   Medication Sig Dispense Refill    ARIPiprazole (Abilify) 5 MG tablet Take 1 tablet by mouth Daily for 180 days. 90 tablet 1    [START ON 2024] methylphenidate (Concerta) 54 MG CR tablet Take 1 tablet by mouth Daily for 30 days 30 tablet 0    propranolol LA (Inderal LA) 60 MG 24 hr capsule Take 1 capsule by mouth Daily for 180 days. 90 capsule 1    traZODone (DESYREL) 100 MG tablet Take 2 tablets by mouth Every Night for 180 days. 180 tablet 1     No current facility-administered medications for this visit.         Review of Systems   Constitutional:  Negative for activity change, appetite change and fatigue.   HENT: Negative.     Eyes: Negative.    Respiratory: Negative.     Cardiovascular: Negative.    Gastrointestinal: Negative.    Endocrine: Negative.    Genitourinary: Negative.    Musculoskeletal: Negative.    Skin: Negative.    Allergic/Immunologic: Negative.    Neurological: Negative.    Hematological: Negative.    Psychiatric/Behavioral:  Negative for decreased concentration, dysphoric mood, self-injury, sleep disturbance and suicidal ideas. The patient is not nervous/anxious.     denies HEENT, cardiovascular, respiratory, liver, renal, GI/, endocrine, neuro, DERM, hematology, immunology, musculoskeletal disorders.    Objective   Physical  "Exam  Vitals reviewed.   Constitutional:       Appearance: Normal appearance.   HENT:      Head: Normocephalic.      Nose: Nose normal.      Mouth/Throat:      Mouth: Mucous membranes are moist.      Pharynx: Oropharynx is clear.   Eyes:      Extraocular Movements: Extraocular movements intact.      Pupils: Pupils are equal, round, and reactive to light.   Cardiovascular:      Rate and Rhythm: Normal rate.   Pulmonary:      Effort: Pulmonary effort is normal.   Musculoskeletal:         General: Normal range of motion.      Cervical back: Normal range of motion.   Skin:     General: Skin is warm and dry.   Neurological:      General: No focal deficit present.      Mental Status: She is alert and oriented to person, place, and time.   Psychiatric:         Attention and Perception: Attention and perception normal. She is attentive.         Mood and Affect: Mood and affect normal. Mood is not anxious or depressed. Affect is not tearful.         Speech: Speech normal.         Behavior: Behavior normal. Behavior is cooperative.         Thought Content: Thought content normal.         Cognition and Memory: Cognition and memory normal.         Judgment: Judgment normal. Judgment is not impulsive.     Blood pressure 121/89, pulse 90, height 165.1 cm (65\"), weight 54.3 kg (119 lb 12.8 oz), SpO2 98%, not currently breastfeeding.    Mental Status Exam:   Hygiene:   fair  Cooperation:  Cooperative  Eye Contact:  Fair  Psychomotor Behavior:  Appropriate  Affect: Appropriate  Hopelessness: Optimistic  Speech:  Normal  Thought Process:  Goal directed and Linear  Thought Content:  Normal and Mood congruent  Suicidal:  None  Homicidal:  None  Hallucinations:  None  Delusion:  None  Memory:  Intact  Orientation:  Person, Place, Time, and Situation  Reliability:  fair  Insight:  Fair  Judgement:  Fair  Impulse Control:  Fair  Physical/Medical Issues:  No       Short-term goals: Patient will be compliant with clinic appointments.  " Patient will be engaged in therapy, medication compliant with minimal side effects. Patient  will report decrease of symptoms and frequency.    Long-term goals: Patient will have minimal symptoms of  with continued medication management. Patient will be compliant with treatment and appointments.     Strengths: Motivation for treatment, insight  Weaknesses: Coping skills, support    Prognosis: Guarded dependent on medication/follow up and treatment plan compliance.  Functional Status: minimum impairment in areas of daily functioning.      Assessment & Plan   Diagnoses and all orders for this visit:    1. Recurrent major depressive disorder, in partial remission (Primary)  -     ARIPiprazole (Abilify) 5 MG tablet; Take 1 tablet by mouth Daily for 180 days.  Dispense: 90 tablet; Refill: 1  -     traZODone (DESYREL) 100 MG tablet; Take 2 tablets by mouth Every Night for 180 days.  Dispense: 180 tablet; Refill: 1  -     methylphenidate (Concerta) 54 MG CR tablet; Take 1 tablet by mouth Daily for 30 days  Dispense: 30 tablet; Refill: 0    2. Post traumatic stress disorder (PTSD)  -     ARIPiprazole (Abilify) 5 MG tablet; Take 1 tablet by mouth Daily for 180 days.  Dispense: 90 tablet; Refill: 1  -     propranolol LA (Inderal LA) 60 MG 24 hr capsule; Take 1 capsule by mouth Daily for 180 days.  Dispense: 90 capsule; Refill: 1  -     traZODone (DESYREL) 100 MG tablet; Take 2 tablets by mouth Every Night for 180 days.  Dispense: 180 tablet; Refill: 1    3. Generalized anxiety disorder  -     ARIPiprazole (Abilify) 5 MG tablet; Take 1 tablet by mouth Daily for 180 days.  Dispense: 90 tablet; Refill: 1  -     propranolol LA (Inderal LA) 60 MG 24 hr capsule; Take 1 capsule by mouth Daily for 180 days.  Dispense: 90 capsule; Refill: 1  -     traZODone (DESYREL) 100 MG tablet; Take 2 tablets by mouth Every Night for 180 days.  Dispense: 180 tablet; Refill: 1    4. Attention deficit hyperactivity disorder (ADHD), unspecified ADHD  type  -     methylphenidate (Concerta) 54 MG CR tablet; Take 1 tablet by mouth Daily for 30 days  Dispense: 30 tablet; Refill: 0    5. Sleeping difficulty  -     traZODone (DESYREL) 100 MG tablet; Take 2 tablets by mouth Every Night for 180 days.  Dispense: 180 tablet; Refill: 1        -UDS reviewed and appropriate  -Ramirez reviewed and appropriate  -Continue trazodone 200 mg nightly for sleep  -Continue Abilify 5 mg p.o. daily for depression  -Continue Inderal LA 60 mg daily for anxiety   -Continue Concerta 54 mg p.o. every morning for ADHD  -Discontinue clonidine related to day time fatigue   -Medications sent to pharmacy at this time    Discussed medication and psychotherapy options. Patient is doing well and is pleased with progress at this time. Patient is to continue Concerta, trazodone, Inderal LA, Abilify. Reiterated side effects that can be seen with each medication individually and in combination. Discussed the risks, benefits, and side effects of the medication; client acknowledged and verbally consented.  Patient is aware to contact the [facility] with any worsening of symptom.  Patient is agreeable to go to the ER or call 911 should they begin SI/HI.      Return in about 3 months (around 12/3/2024), or if symptoms worsen or fail to improve, for Next scheduled follow up.        This document has been electronically signed by KANDY Reyes   September 4, 2024 10:29 EDT     Errors in dictation may reflect use of voice recognition software and not all errors in transcription may have been detected prior to signing.

## 2024-10-31 DIAGNOSIS — F33.41 RECURRENT MAJOR DEPRESSIVE DISORDER, IN PARTIAL REMISSION: ICD-10-CM

## 2024-10-31 DIAGNOSIS — F90.9 ATTENTION DEFICIT HYPERACTIVITY DISORDER (ADHD), UNSPECIFIED ADHD TYPE: ICD-10-CM

## 2024-10-31 RX ORDER — METHYLPHENIDATE HYDROCHLORIDE 54 MG/1
54 TABLET ORAL DAILY
Qty: 30 TABLET | Refills: 0 | Status: SHIPPED | OUTPATIENT
Start: 2024-10-31 | End: 2024-11-30

## 2024-12-02 DIAGNOSIS — F90.9 ATTENTION DEFICIT HYPERACTIVITY DISORDER (ADHD), UNSPECIFIED ADHD TYPE: ICD-10-CM

## 2024-12-02 DIAGNOSIS — F33.41 RECURRENT MAJOR DEPRESSIVE DISORDER, IN PARTIAL REMISSION: ICD-10-CM

## 2024-12-02 RX ORDER — METHYLPHENIDATE HYDROCHLORIDE 54 MG/1
54 TABLET ORAL DAILY
Qty: 30 TABLET | Refills: 0 | Status: SHIPPED | OUTPATIENT
Start: 2024-12-02 | End: 2025-01-01

## 2024-12-03 ENCOUNTER — OFFICE VISIT (OUTPATIENT)
Dept: PSYCHIATRY | Facility: CLINIC | Age: 34
End: 2024-12-03
Payer: COMMERCIAL

## 2024-12-03 VITALS
OXYGEN SATURATION: 98 % | BODY MASS INDEX: 20.09 KG/M2 | HEIGHT: 65 IN | SYSTOLIC BLOOD PRESSURE: 128 MMHG | DIASTOLIC BLOOD PRESSURE: 95 MMHG | HEART RATE: 86 BPM | WEIGHT: 120.6 LBS

## 2024-12-03 DIAGNOSIS — F90.9 ATTENTION DEFICIT HYPERACTIVITY DISORDER (ADHD), UNSPECIFIED ADHD TYPE: Primary | ICD-10-CM

## 2024-12-03 DIAGNOSIS — Z79.899 HIGH RISK MEDICATION USE: ICD-10-CM

## 2024-12-03 DIAGNOSIS — F33.41 RECURRENT MAJOR DEPRESSIVE DISORDER, IN PARTIAL REMISSION: ICD-10-CM

## 2024-12-03 DIAGNOSIS — F41.1 GENERALIZED ANXIETY DISORDER: ICD-10-CM

## 2024-12-03 DIAGNOSIS — F43.10 POST TRAUMATIC STRESS DISORDER (PTSD): ICD-10-CM

## 2024-12-03 PROCEDURE — 99214 OFFICE O/P EST MOD 30 MIN: CPT

## 2024-12-03 NOTE — PROGRESS NOTES
"Subjective   Dominique De Dios is a 34 y.o. female who is here today for medication management follow up encounter.     Chief Complaint:  anxiety, depression,attention/focus deficits.    HPI:    Patient denies negative side effects with current regimen. Mood has been stable. Attention and focus concerns well controlled. Anxiety has been increased to stipulate school.  Reports that she is often finding herself experiencing intermittent panic.  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  Sleeps appropriate.  Appetite is appropriate. Body mass index is 20.07 kg/m².  She denies AVH. She denies SI and HI. She denies SH.     Past Psych History: Diagnosed with anxiety and depression per PCP.  Medication management in the past has been by primary care.  Denies any inpatient psychiatric hospitalizations or outpatient providers.  Denies a history of TBI or seizures.  Denies any knowledge of exposure to illicit substance or toxins while in utero.  Denies any knowledge of  complications.    Previous Psych Meds: Patient reports that in the past she has previously trialed Wellbutrin, Prozac, Lexapro, Celexa, and BuSpar.  Reports that she does feel she has tried other ones but cannot recall them at this time.    Substance Abuse: Patient denies a history of or present illicit substance use, nicotine.  Does report current utilization socially of alcohol.    Social History: Patient was born and raised in [place] [facility] [place] moving to [place] during high school.  Parents .  1 sister.  High school and college graduate.  Employed as a .  Previous marriage and .  1 daughter age 6.  Denies any incarcerations or previous  service.    Family Psychiatric History:  family history includes Breast cancer in her maternal grandmother; Heart attack in " her father; Heart disease in her father; Stroke in her father.    Medical/Surgical History:  Past Medical History:   Diagnosis Date    Acid reflux     Anxiety     Elevated cholesterol     Hypertension     Migraine     Migraines     Urinary tract infection      Past Surgical History:   Procedure Laterality Date     SECTION Bilateral 2016    Procedure:  SECTION PRIMARY;  Surgeon: Valdez Bach DO;  Location: [facility] LABOR DELIVERY;  Service:        Allergies   Allergen Reactions    Ceclor [Cefaclor] Rash     childhood           Current Medications:   Current Outpatient Medications   Medication Sig Dispense Refill    propranolol LA (INDERAL LA) 80 MG 24 hr capsule Take 1 capsule by mouth Daily for 90 days. 90 capsule 0    ARIPiprazole (Abilify) 5 MG tablet Take 1 tablet by mouth Daily for 180 days. 90 tablet 1    methylphenidate (Concerta) 54 MG CR tablet Take 1 tablet by mouth Daily for 30 days 30 tablet 0    traZODone (DESYREL) 100 MG tablet Take 2 tablets by mouth Every Night for 180 days. 180 tablet 1     No current facility-administered medications for this visit.         Review of Systems   Constitutional:  Negative for activity change, appetite change and fatigue.   HENT: Negative.     Eyes: Negative.    Respiratory: Negative.     Cardiovascular: Negative.    Gastrointestinal: Negative.    Endocrine: Negative.    Genitourinary: Negative.    Musculoskeletal: Negative.    Skin: Negative.    Allergic/Immunologic: Negative.    Neurological: Negative.    Hematological: Negative.    Psychiatric/Behavioral:  Negative for decreased concentration, dysphoric mood, self-injury, sleep disturbance and suicidal ideas. The patient is not nervous/anxious.     denies HEENT, cardiovascular, respiratory, liver, renal, GI/, endocrine, neuro, DERM, hematology, immunology, musculoskeletal disorders.    Objective   Physical Exam  Vitals reviewed.   Constitutional:       Appearance: Normal appearance.  "  HENT:      Head: Normocephalic.      Nose: Nose normal.      Mouth/Throat:      Mouth: Mucous membranes are moist.      Pharynx: Oropharynx is clear.   Eyes:      Extraocular Movements: Extraocular movements intact.      Pupils: Pupils are equal, round, and reactive to light.   Cardiovascular:      Rate and Rhythm: Normal rate.   Pulmonary:      Effort: Pulmonary effort is normal.   Musculoskeletal:         General: Normal range of motion.      Cervical back: Normal range of motion.   Skin:     General: Skin is warm and dry.   Neurological:      General: No focal deficit present.      Mental Status: She is alert and oriented to person, place, and time.   Psychiatric:         Attention and Perception: Attention and perception normal. She is attentive.         Mood and Affect: Mood and affect normal. Mood is not anxious or depressed. Affect is not tearful.         Speech: Speech normal.         Behavior: Behavior normal. Behavior is cooperative.         Thought Content: Thought content normal.         Cognition and Memory: Cognition and memory normal.         Judgment: Judgment normal. Judgment is not impulsive.     Blood pressure 128/95, pulse 86, height 165.1 cm (65\"), weight 54.7 kg (120 lb 9.6 oz), SpO2 98%, not currently breastfeeding.    Mental Status Exam:   Hygiene:   fair  Cooperation:  Cooperative  Eye Contact:  Fair  Psychomotor Behavior:  Appropriate  Affect: Appropriate  Hopelessness: Optimistic  Speech:  Normal  Thought Process:  Goal directed and Linear  Thought Content:  Normal and Mood congruent  Suicidal:  None  Homicidal:  None  Hallucinations:  None  Delusion:  None  Memory:  Intact  Orientation:  Person, Place, Time, and Situation  Reliability:  fair  Insight:  Fair  Judgement:  Fair  Impulse Control:  Fair  Physical/Medical Issues:  No       Short-term goals: Patient will be compliant with clinic appointments.  Patient will be engaged in therapy, medication compliant with minimal side effects. " Patient  will report decrease of symptoms and frequency.    Long-term goals: Patient will have minimal symptoms of  with continued medication management. Patient will be compliant with treatment and appointments.     Strengths: Motivation for treatment, insight  Weaknesses: Coping skills, support    Prognosis: Guarded dependent on medication/follow up and treatment plan compliance.  Functional Status: minimum impairment in areas of daily functioning.      Assessment & Plan   Diagnoses and all orders for this visit:    1. Attention deficit hyperactivity disorder (ADHD), unspecified ADHD type (Primary)    2. Post traumatic stress disorder (PTSD)  -     propranolol LA (INDERAL LA) 80 MG 24 hr capsule; Take 1 capsule by mouth Daily for 90 days.  Dispense: 90 capsule; Refill: 0    3. Generalized anxiety disorder  -     propranolol LA (INDERAL LA) 80 MG 24 hr capsule; Take 1 capsule by mouth Daily for 90 days.  Dispense: 90 capsule; Refill: 0    4. Recurrent major depressive disorder, in partial remission    5. High risk medication use  -     Lipid Panel; Future  -     Hemoglobin A1c; Future          -Lipid and A1c ordered and pending  -Ramirez reviewed and appropriate  -Continue trazodone 200 mg nightly for sleep  -Continue Abilify 5 mg p.o. daily for depression  -Increase Inderal LA to 80 mg daily for anxiety   -Continue Concerta 54 mg p.o. every morning for ADHD  -Medications sent to pharmacy at this time    Discussed medication and psychotherapy options. Patient is doing well and is pleased with progress at this time except for anxiety. She is to continue trazodone, Abilify, Concerta without change.  Increasing Inderal LA to better target anxiety.  Reiterated side effects that can be seen with each medication individually and in combination. Discussed the risks, benefits, and side effects of the medication; client acknowledged and verbally consented.  Patient is aware to contact the [facility] with any worsening of  symptom.  Patient is agreeable to go to the ER or call 911 should they begin SI/HI.      Return in about 3 months (around 3/3/2025), or if symptoms worsen or fail to improve, for Next scheduled follow up.        This document has been electronically signed by KANDY Reyes   December 4, 2024 15:59 EST     Errors in dictation may reflect use of voice recognition software and not all errors in transcription may have been detected prior to signing.

## 2024-12-04 RX ORDER — PROPRANOLOL HYDROCHLORIDE 80 MG/1
80 CAPSULE, EXTENDED RELEASE ORAL DAILY
Qty: 90 CAPSULE | Refills: 0 | Status: SHIPPED | OUTPATIENT
Start: 2024-12-04 | End: 2025-03-04

## 2025-01-02 DIAGNOSIS — F90.9 ATTENTION DEFICIT HYPERACTIVITY DISORDER (ADHD), UNSPECIFIED ADHD TYPE: ICD-10-CM

## 2025-01-02 DIAGNOSIS — F33.41 RECURRENT MAJOR DEPRESSIVE DISORDER, IN PARTIAL REMISSION: ICD-10-CM

## 2025-01-02 RX ORDER — METHYLPHENIDATE HYDROCHLORIDE 54 MG/1
54 TABLET ORAL DAILY
Qty: 30 TABLET | Refills: 0 | Status: SHIPPED | OUTPATIENT
Start: 2025-01-02 | End: 2025-02-01

## 2025-02-03 DIAGNOSIS — F33.41 RECURRENT MAJOR DEPRESSIVE DISORDER, IN PARTIAL REMISSION: ICD-10-CM

## 2025-02-03 DIAGNOSIS — F90.9 ATTENTION DEFICIT HYPERACTIVITY DISORDER (ADHD), UNSPECIFIED ADHD TYPE: ICD-10-CM

## 2025-02-03 RX ORDER — METHYLPHENIDATE HYDROCHLORIDE 54 MG/1
54 TABLET ORAL DAILY
Qty: 30 TABLET | Refills: 0 | Status: SHIPPED | OUTPATIENT
Start: 2025-02-03 | End: 2025-03-05

## 2025-02-13 ENCOUNTER — LAB (OUTPATIENT)
Dept: FAMILY MEDICINE CLINIC | Facility: CLINIC | Age: 35
End: 2025-02-13
Payer: COMMERCIAL

## 2025-02-13 DIAGNOSIS — Z79.899 HIGH RISK MEDICATION USE: ICD-10-CM

## 2025-02-13 LAB
CHOLEST SERPL-MCNC: 237 MG/DL (ref 0–200)
HBA1C MFR BLD: 4.6 % (ref 4.8–5.6)
HDLC SERPL-MCNC: 73 MG/DL (ref 40–60)
LDLC SERPL CALC-MCNC: 153 MG/DL (ref 0–100)
LDLC/HDLC SERPL: 2.06 {RATIO}
TRIGL SERPL-MCNC: 68 MG/DL (ref 0–150)
VLDLC SERPL-MCNC: 11 MG/DL (ref 5–40)

## 2025-02-13 PROCEDURE — 83036 HEMOGLOBIN GLYCOSYLATED A1C: CPT

## 2025-02-13 PROCEDURE — 36415 COLL VENOUS BLD VENIPUNCTURE: CPT

## 2025-02-13 PROCEDURE — 80061 LIPID PANEL: CPT

## 2025-02-14 ENCOUNTER — TELEPHONE (OUTPATIENT)
Dept: FAMILY MEDICINE CLINIC | Facility: CLINIC | Age: 35
End: 2025-02-14
Payer: COMMERCIAL

## 2025-02-14 NOTE — TELEPHONE ENCOUNTER
So called about her Hgb A1c being low on her labs from yesterday.  Patient is aware you are out till Monday and verbalized understanding

## 2025-02-18 ENCOUNTER — TELEPHONE (OUTPATIENT)
Dept: FAMILY MEDICINE CLINIC | Facility: CLINIC | Age: 35
End: 2025-02-18
Payer: COMMERCIAL

## 2025-02-18 NOTE — TELEPHONE ENCOUNTER
----- Message from Linnette Robles sent at 2/18/2025  9:32 AM EST -----  Hemoglobin A1c is nonconcerning.   Lipid panel slightly elevated. Recommend lifestyle modifications. Increase physical activity as tolerated and heart healthy diet. Is she taking any medication for cholesterol with her PCP? I can place a referral to an online nutrition platform if shed like.  ----- Message -----  From: Lab, Background User  Sent: 2/13/2025   7:20 PM EST  To: KANDY Reyes

## 2025-03-05 ENCOUNTER — OFFICE VISIT (OUTPATIENT)
Dept: PSYCHIATRY | Facility: CLINIC | Age: 35
End: 2025-03-05
Payer: COMMERCIAL

## 2025-03-05 VITALS
WEIGHT: 122.2 LBS | SYSTOLIC BLOOD PRESSURE: 138 MMHG | DIASTOLIC BLOOD PRESSURE: 94 MMHG | BODY MASS INDEX: 20.36 KG/M2 | HEIGHT: 65 IN | HEART RATE: 87 BPM | OXYGEN SATURATION: 98 %

## 2025-03-05 DIAGNOSIS — F88 SENSORY PROCESSING DIFFICULTY: ICD-10-CM

## 2025-03-05 DIAGNOSIS — G47.9 SLEEPING DIFFICULTY: ICD-10-CM

## 2025-03-05 DIAGNOSIS — F33.41 RECURRENT MAJOR DEPRESSIVE DISORDER, IN PARTIAL REMISSION: ICD-10-CM

## 2025-03-05 DIAGNOSIS — F43.10 POST TRAUMATIC STRESS DISORDER (PTSD): ICD-10-CM

## 2025-03-05 DIAGNOSIS — F41.1 GENERALIZED ANXIETY DISORDER: ICD-10-CM

## 2025-03-05 DIAGNOSIS — F90.9 ATTENTION DEFICIT HYPERACTIVITY DISORDER (ADHD), UNSPECIFIED ADHD TYPE: Primary | ICD-10-CM

## 2025-03-05 PROCEDURE — 99214 OFFICE O/P EST MOD 30 MIN: CPT

## 2025-03-05 RX ORDER — METHYLPHENIDATE HYDROCHLORIDE 54 MG/1
54 TABLET ORAL DAILY
Qty: 30 TABLET | Refills: 0 | Status: CANCELLED | OUTPATIENT
Start: 2025-03-05 | End: 2025-04-04

## 2025-03-05 NOTE — PROGRESS NOTES
"Subjective   Dominique De Dios is a 34 y.o. female who is here today for medication management follow up encounter.     Chief Complaint:  anxiety, depression,attention/focus deficits.    History of Present Illness  Patient denies negative side effects associated with current regimen.  She reports a recent episode of syncope, which occurred approximately 2 to 3 weeks ago. She is uncertain if this event was precipitated by a drop in her blood glucose levels. She has not yet consulted her primary care physician regarding this incident but plans to do so at the earliest opportunity. Prior to the syncope, she experienced symptoms of shakiness and perspiration. Her dietary intake on the day of the incident was minimal.  She denied any chest pain.  Post-episode, she consumed food and felt an improvement in her condition, although she experienced residual weakness and mild headaches throughout the day. She reports a slight increase in her anxiety levels, which she attributes to the current time of year. 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  She reports that she has got some significantly difficult students in the D classroom this year.  She reports that she feels that she is \"always on alert fight or flight.\"  She reports no issues with depression. Her anxiety peaks around midday, typically between 11:00 AM and 12:00 PM. She takes Abilify at night and Concerta upon waking around 6:40 AM.She expresses concern about a potential autism spectrum disorder, citing aversion to textures, overstimulation by noises, tics, and a preference for certain routines. She also reports obsessive behaviors, such as hyperfocus on a mobile game. She has noticed similar changes in her students. She engages in skin picking and recalls an incident where she felt physically ill due to excessive noise at a murder mystery dinner show.  Often feels overstimulated in the afternoons in which she wants to be left alone and not " touched.  Sleep is appropriate.  Denies any variation in appetite. Body mass index is 20.34 kg/m².  Denies AVH.  Denies SI and HI. Denies self-harm.    Family Psychiatric History:  family history includes Breast cancer in her maternal grandmother; Heart attack in her father; Heart disease in her father; Stroke in her father.    Medical/Surgical History:  Past Medical History:   Diagnosis Date    Acid reflux     Anxiety     Elevated cholesterol     Hypertension     Migraine     Migraines     Urinary tract infection      Past Surgical History:   Procedure Laterality Date     SECTION Bilateral 2016    Procedure:  SECTION PRIMARY;  Surgeon: Valdez Bach DO;  Location: [facility] LABOR DELIVERY;  Service:        Allergies   Allergen Reactions    Ceclor [Cefaclor] Rash     childhood           Current Medications:   Current Outpatient Medications   Medication Sig Dispense Refill    ARIPiprazole (Abilify) 5 MG tablet Take 1 tablet by mouth Daily for 180 days. 90 tablet 1    methylphenidate (Concerta) 54 MG CR tablet Take 1 tablet by mouth Daily for 30 days 30 tablet 0    propranolol LA (INDERAL LA) 80 MG 24 hr capsule Take 1 capsule by mouth Daily for 90 days. 90 capsule 0    traZODone (DESYREL) 100 MG tablet Take 2 tablets by mouth Every Night for 180 days. 180 tablet 1    guanFACINE (TENEX) 1 MG tablet Take 0.5 tablets by mouth Every Night for 60 days. 15 tablet 1     No current facility-administered medications for this visit.         Review of Systems   Constitutional:  Negative for activity change, appetite change, chills, diaphoresis, fatigue and fever.   HENT: Negative.  Negative for congestion and sore throat.    Eyes: Negative.    Respiratory: Negative.  Negative for cough.    Cardiovascular: Negative.  Negative for chest pain.   Gastrointestinal: Negative.  Negative for abdominal pain, nausea and vomiting.   Endocrine: Negative.    Genitourinary: Negative.  Negative for dysuria.  "  Musculoskeletal: Negative.  Negative for myalgias and neck pain.   Skin: Negative.  Negative for rash.   Allergic/Immunologic: Negative.    Neurological: Negative.  Negative for weakness, numbness and headaches.   Hematological: Negative.    Psychiatric/Behavioral:  Positive for decreased concentration. Negative for dysphoric mood, self-injury, sleep disturbance and suicidal ideas. The patient is nervous/anxious.     denies HEENT, cardiovascular, respiratory, liver, renal, GI/, endocrine, neuro, DERM, hematology, immunology, musculoskeletal disorders.    Objective   Physical Exam  Vitals reviewed.   Constitutional:       Appearance: Normal appearance.   HENT:      Head: Normocephalic.      Nose: Nose normal.      Mouth/Throat:      Mouth: Mucous membranes are moist.      Pharynx: Oropharynx is clear.   Eyes:      Extraocular Movements: Extraocular movements intact.      Pupils: Pupils are equal, round, and reactive to light.   Cardiovascular:      Rate and Rhythm: Normal rate.   Pulmonary:      Effort: Pulmonary effort is normal.   Musculoskeletal:         General: Normal range of motion.      Cervical back: Normal range of motion.   Skin:     General: Skin is warm and dry.   Neurological:      General: No focal deficit present.      Mental Status: She is alert and oriented to person, place, and time.   Psychiatric:         Attention and Perception: Attention and perception normal. She is attentive.         Mood and Affect: Mood and affect normal. Mood is not anxious or depressed. Affect is not tearful.         Speech: Speech normal.         Behavior: Behavior normal. Behavior is cooperative.         Thought Content: Thought content normal.         Cognition and Memory: Cognition and memory normal.         Judgment: Judgment normal. Judgment is not impulsive.     Blood pressure 138/94, pulse 87, height 165.1 cm (65\"), weight 55.4 kg (122 lb 3.2 oz), SpO2 98%, not currently breastfeeding.    Mental Status Exam: "   Hygiene:   fair  Cooperation:  Cooperative  Eye Contact:  Fair  Psychomotor Behavior:  Appropriate  Affect: Appropriate  Hopelessness: Optimistic  Speech:  Normal  Thought Process:  Goal directed and Linear  Thought Content:  Normal and Mood congruent  Suicidal:  None  Homicidal:  None  Hallucinations:  None  Delusion:  None  Memory:  Intact  Orientation:  Person, Place, Time, and Situation  Reliability:  fair  Insight:  Fair  Judgement:  Fair  Impulse Control:  Fair  Physical/Medical Issues:  No       Short-term goals: Patient will be compliant with clinic appointments.  Patient will be engaged in therapy, medication compliant with minimal side effects. Patient  will report decrease of symptoms and frequency.    Long-term goals: Patient will have minimal symptoms of  with continued medication management. Patient will be compliant with treatment and appointments.     Strengths: Motivation for treatment, insight  Weaknesses: Coping skills, support    Prognosis: Guarded dependent on medication/follow up and treatment plan compliance.  Functional Status: minimum impairment in areas of daily functioning.      Assessment & Plan   Diagnoses and all orders for this visit:    1. Attention deficit hyperactivity disorder (ADHD), unspecified ADHD type (Primary)  -     guanFACINE (TENEX) 1 MG tablet; Take 0.5 tablets by mouth Every Night for 60 days.  Dispense: 15 tablet; Refill: 1  -     methylphenidate (Concerta) 54 MG CR tablet; Take 1 tablet by mouth Daily for 30 days  Dispense: 30 tablet; Refill: 0    2. Recurrent major depressive disorder, in partial remission  -     ARIPiprazole (Abilify) 5 MG tablet; Take 1 tablet by mouth Daily for 180 days.  Dispense: 90 tablet; Refill: 1  -     traZODone (DESYREL) 100 MG tablet; Take 2 tablets by mouth Every Night for 180 days.  Dispense: 180 tablet; Refill: 1  -     methylphenidate (Concerta) 54 MG CR tablet; Take 1 tablet by mouth Daily for 30 days  Dispense: 30 tablet; Refill:  0    3. Post traumatic stress disorder (PTSD)  -     ARIPiprazole (Abilify) 5 MG tablet; Take 1 tablet by mouth Daily for 180 days.  Dispense: 90 tablet; Refill: 1  -     propranolol LA (INDERAL LA) 80 MG 24 hr capsule; Take 1 capsule by mouth Daily for 90 days.  Dispense: 90 capsule; Refill: 0  -     traZODone (DESYREL) 100 MG tablet; Take 2 tablets by mouth Every Night for 180 days.  Dispense: 180 tablet; Refill: 1    4. Generalized anxiety disorder  -     ARIPiprazole (Abilify) 5 MG tablet; Take 1 tablet by mouth Daily for 180 days.  Dispense: 90 tablet; Refill: 1  -     propranolol LA (INDERAL LA) 80 MG 24 hr capsule; Take 1 capsule by mouth Daily for 90 days.  Dispense: 90 capsule; Refill: 0  -     traZODone (DESYREL) 100 MG tablet; Take 2 tablets by mouth Every Night for 180 days.  Dispense: 180 tablet; Refill: 1  -     guanFACINE (TENEX) 1 MG tablet; Take 0.5 tablets by mouth Every Night for 60 days.  Dispense: 15 tablet; Refill: 1    5. Sleeping difficulty  -     traZODone (DESYREL) 100 MG tablet; Take 2 tablets by mouth Every Night for 180 days.  Dispense: 180 tablet; Refill: 1    6. Sensory processing difficulty  -     guanFACINE (TENEX) 1 MG tablet; Take 0.5 tablets by mouth Every Night for 60 days.  Dispense: 15 tablet; Refill: 1      -Ramirez reviewed and appropriate  -Continue trazodone 200 mg nightly for sleep  -Continue Abilify 5 mg p.o. daily for depression  -Continue Inderal LA 80 mg daily for anxiety   -Continue Concerta 54 mg p.o. every morning for ADHD  -Lipid panel slightly elevated.  Encouraged diet and exercise.  -Start guanfacine 0.5 mg p.o. daily midday for anxiety, attention and focus  -Medications sent to pharmacy at this time    Discussed medication and psychotherapy options. Her anxiety appears to be exacerbated during the midday hours, potentially due to the wearing off of her Concerta medication. She is advised to monitor her symptoms closely during this period to differentiate between  feelings of being overwhelmed and anxious. If necessary, an afternoon dose of Concerta may be added to her regimen to provide additional support during the school day without impacting her sleep.Her symptoms are consistent with ADHD rather than autism spectrum disorder. The sensory processing disorder and misophonia she experiences are common comorbidities with ADHD. She is advised to continue her current medication regimen and monitor for any changes in symptoms. Low dose guanfacine midday to see if this helps with overstimulation, anxiety, and overwhelm. The etiology of the syncope could potentially be attributed to hypoglycemia, necessitating further investigation.A comprehensive workup will need to be conducted by her primary care physician for further assessment.She is to continue trazodone, Abilify, Inderal LA, and Concerta without change. Reiterated side effects that can be seen with each medication individually and in combination. Discussed the risks, benefits, and side effects of the medication; client acknowledged and verbally consented.  Patient is aware to contact the [facility] with any worsening of symptom.  Patient is agreeable to go to the ER or call 911 should they begin SI/HI.      Return in about 6 weeks (around 4/16/2025), or if symptoms worsen or fail to improve, for Next scheduled follow up.        This document has been electronically signed by KANDY Reyes   March 6, 2025 12:23 EST  Please note that portions of this note were completed with a voice recognition program  Patient or patient representative verbalized consent for the use of Ambient Listening during the visit with  KANDY Reyes for chart documentation. 3/6/2025  12:22 EST

## 2025-03-06 DIAGNOSIS — F90.9 ATTENTION DEFICIT HYPERACTIVITY DISORDER (ADHD), UNSPECIFIED ADHD TYPE: ICD-10-CM

## 2025-03-06 DIAGNOSIS — F33.41 RECURRENT MAJOR DEPRESSIVE DISORDER, IN PARTIAL REMISSION: ICD-10-CM

## 2025-03-06 RX ORDER — METHYLPHENIDATE HYDROCHLORIDE 54 MG/1
54 TABLET ORAL DAILY
Qty: 30 TABLET | Refills: 0 | Status: CANCELLED | OUTPATIENT
Start: 2025-03-06 | End: 2025-04-05

## 2025-03-06 RX ORDER — ARIPIPRAZOLE 5 MG/1
5 TABLET ORAL DAILY
Qty: 90 TABLET | Refills: 1 | Status: SHIPPED | OUTPATIENT
Start: 2025-03-06 | End: 2025-09-02

## 2025-03-06 RX ORDER — METHYLPHENIDATE HYDROCHLORIDE 54 MG/1
54 TABLET ORAL DAILY
Qty: 30 TABLET | Refills: 0 | Status: SHIPPED | OUTPATIENT
Start: 2025-03-06 | End: 2025-04-05

## 2025-03-06 RX ORDER — GUANFACINE 1 MG/1
0.5 TABLET ORAL NIGHTLY
Qty: 15 TABLET | Refills: 1 | Status: SHIPPED | OUTPATIENT
Start: 2025-03-06 | End: 2025-05-05

## 2025-03-06 RX ORDER — TRAZODONE HYDROCHLORIDE 100 MG/1
200 TABLET ORAL NIGHTLY
Qty: 180 TABLET | Refills: 1 | Status: SHIPPED | OUTPATIENT
Start: 2025-03-06 | End: 2025-09-02

## 2025-03-06 RX ORDER — PROPRANOLOL HYDROCHLORIDE 80 MG/1
80 CAPSULE, EXTENDED RELEASE ORAL DAILY
Qty: 90 CAPSULE | Refills: 0 | Status: SHIPPED | OUTPATIENT
Start: 2025-03-06 | End: 2025-06-04

## 2025-04-07 DIAGNOSIS — F90.9 ATTENTION DEFICIT HYPERACTIVITY DISORDER (ADHD), UNSPECIFIED ADHD TYPE: ICD-10-CM

## 2025-04-07 DIAGNOSIS — F33.41 RECURRENT MAJOR DEPRESSIVE DISORDER, IN PARTIAL REMISSION: ICD-10-CM

## 2025-04-07 RX ORDER — METHYLPHENIDATE HYDROCHLORIDE 54 MG/1
54 TABLET ORAL DAILY
Qty: 30 TABLET | Refills: 0 | Status: SHIPPED | OUTPATIENT
Start: 2025-04-07 | End: 2025-05-07

## 2025-05-01 ENCOUNTER — OFFICE VISIT (OUTPATIENT)
Dept: PSYCHIATRY | Facility: CLINIC | Age: 35
End: 2025-05-01
Payer: COMMERCIAL

## 2025-05-01 ENCOUNTER — TRANSCRIBE ORDERS (OUTPATIENT)
Dept: ADMINISTRATIVE | Facility: HOSPITAL | Age: 35
End: 2025-05-01
Payer: COMMERCIAL

## 2025-05-01 VITALS
OXYGEN SATURATION: 98 % | SYSTOLIC BLOOD PRESSURE: 126 MMHG | HEIGHT: 65 IN | WEIGHT: 125.8 LBS | HEART RATE: 80 BPM | DIASTOLIC BLOOD PRESSURE: 90 MMHG | BODY MASS INDEX: 20.96 KG/M2

## 2025-05-01 DIAGNOSIS — F90.9 ATTENTION DEFICIT HYPERACTIVITY DISORDER (ADHD), UNSPECIFIED ADHD TYPE: ICD-10-CM

## 2025-05-01 DIAGNOSIS — F33.41 RECURRENT MAJOR DEPRESSIVE DISORDER, IN PARTIAL REMISSION: ICD-10-CM

## 2025-05-01 DIAGNOSIS — F41.1 GENERALIZED ANXIETY DISORDER: ICD-10-CM

## 2025-05-01 DIAGNOSIS — F43.10 POST TRAUMATIC STRESS DISORDER (PTSD): Primary | ICD-10-CM

## 2025-05-01 DIAGNOSIS — K76.0 FATTY LIVER: Primary | ICD-10-CM

## 2025-05-01 PROCEDURE — 99214 OFFICE O/P EST MOD 30 MIN: CPT

## 2025-05-01 RX ORDER — METHYLPHENIDATE HYDROCHLORIDE 54 MG/1
54 TABLET ORAL DAILY
Qty: 30 TABLET | Refills: 0 | Status: SHIPPED | OUTPATIENT
Start: 2025-05-05 | End: 2025-06-04

## 2025-05-01 NOTE — PROGRESS NOTES
Subjective   Dominique De Dios is a 34 y.o. female who is here today for medication management follow up encounter.     Chief Complaint:  anxiety, depression,attention/focus deficits.    History of Present Illness  She denies negative side effects associated with current regimen. 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 This student has exhibited violent behavior, necessitating police intervention. The patient experiences heightened anxiety on the days the student is present, even though the student only attends school two days a week. Guanfacine has helped some. She has not experienced any recent syncope episodes. She also reports a recent episode of depression lasting four days, which she believes was triggered by high anxiety levels over several consecutive days related to being overwhelmed and overstimulated. Sleep has been more disrupted with increased anxiety: how manageable. Appetite vastly unchanged. Body mass index is 20.93 kg/m².Attention and focus well controlled for the most part. More difficult on days when she is experiencing extreme distress associated with the above.  She denies AVH.  Denies self-harm.  Denies SI and HI.    Family Psychiatric History:  family history includes Breast cancer in her maternal grandmother; Heart attack in her father; Heart disease in her father; Stroke in her father.    Medical/Surgical History:  Past Medical History:   Diagnosis Date    Acid reflux     Anxiety     Elevated cholesterol     Hypertension     Migraine     Migraines     Urinary tract infection      Past Surgical History:   Procedure Laterality Date     SECTION Bilateral 2016    Procedure:  SECTION PRIMARY;  Surgeon: Valdez Bach DO;  Location: [facility] LABOR DELIVERY;  Service:        Allergies   Allergen Reactions    Ceclor [Cefaclor] Rash     childhood           Current Medications:    Current Outpatient Medications   Medication Sig Dispense Refill    [START ON 5/5/2025] methylphenidate (Concerta) 54 MG CR tablet Take 1 tablet by mouth Daily for 30 days 30 tablet 0    ARIPiprazole (Abilify) 5 MG tablet Take 1 tablet by mouth Daily for 180 days. 90 tablet 1    guanFACINE (TENEX) 1 MG tablet Take 0.5 tablets by mouth Every Night for 60 days. 15 tablet 1    propranolol LA (INDERAL LA) 80 MG 24 hr capsule Take 1 capsule by mouth Daily for 90 days. 90 capsule 0    traZODone (DESYREL) 100 MG tablet Take 2 tablets by mouth Every Night for 180 days. 180 tablet 1     No current facility-administered medications for this visit.         Review of Systems   Constitutional:  Negative for activity change, appetite change, chills, diaphoresis, fatigue and fever.   HENT: Negative.  Negative for congestion and sore throat.    Eyes: Negative.    Respiratory: Negative.  Negative for cough.    Cardiovascular: Negative.  Negative for chest pain.   Gastrointestinal: Negative.  Negative for abdominal pain, nausea and vomiting.   Endocrine: Negative.    Genitourinary: Negative.  Negative for dysuria.   Musculoskeletal: Negative.  Negative for myalgias and neck pain.   Skin: Negative.  Negative for rash.   Allergic/Immunologic: Negative.    Neurological: Negative.  Negative for weakness, numbness and headaches.   Hematological: Negative.    Psychiatric/Behavioral:  Positive for decreased concentration. Negative for dysphoric mood, self-injury, sleep disturbance and suicidal ideas. The patient is nervous/anxious.     denies HEENT, cardiovascular, respiratory, liver, renal, GI/, endocrine, neuro, DERM, hematology, immunology, musculoskeletal disorders.    Objective   Physical Exam  Vitals reviewed.   Constitutional:       Appearance: Normal appearance.   HENT:      Head: Normocephalic.      Nose: Nose normal.      Mouth/Throat:      Mouth: Mucous membranes are moist.      Pharynx: Oropharynx is clear.   Eyes:       "Extraocular Movements: Extraocular movements intact.      Pupils: Pupils are equal, round, and reactive to light.   Cardiovascular:      Rate and Rhythm: Normal rate.   Pulmonary:      Effort: Pulmonary effort is normal.   Musculoskeletal:         General: Normal range of motion.      Cervical back: Normal range of motion.   Skin:     General: Skin is warm and dry.   Neurological:      General: No focal deficit present.      Mental Status: She is alert and oriented to person, place, and time.   Psychiatric:         Attention and Perception: Attention and perception normal. She is attentive.         Mood and Affect: Mood and affect normal. Mood is not anxious or depressed. Affect is not tearful.         Speech: Speech normal.         Behavior: Behavior normal. Behavior is cooperative.         Thought Content: Thought content normal.         Cognition and Memory: Cognition and memory normal.         Judgment: Judgment normal. Judgment is not impulsive.     Blood pressure 126/90, pulse 80, height 165.1 cm (65\"), weight 57.1 kg (125 lb 12.8 oz), SpO2 98%, not currently breastfeeding.    Mental Status Exam:   Hygiene:   fair  Cooperation:  Cooperative  Eye Contact:  Fair  Psychomotor Behavior:  Appropriate  Affect: Appropriate  Hopelessness: Optimistic  Speech:  Normal  Thought Process:  Goal directed and Linear  Thought Content:  Normal and Mood congruent  Suicidal:  None  Homicidal:  None  Hallucinations:  None  Delusion:  None  Memory:  Intact  Orientation:  Person, Place, Time, and Situation  Reliability:  fair  Insight:  Fair  Judgement:  Fair  Impulse Control:  Fair  Physical/Medical Issues:  No       Short-term goals: Patient will be compliant with clinic appointments.  Patient will be engaged in therapy, medication compliant with minimal side effects. Patient  will report decrease of symptoms and frequency.    Long-term goals: Patient will have minimal symptoms of  with continued medication management. Patient " will be compliant with treatment and appointments.     Strengths: Motivation for treatment, insight  Weaknesses: Coping skills, support    Prognosis: Guarded dependent on medication/follow up and treatment plan compliance.  Functional Status: minimum impairment in areas of daily functioning.      Assessment & Plan   Diagnoses and all orders for this visit:    1. Post traumatic stress disorder (PTSD) (Primary)    2. Recurrent major depressive disorder, in partial remission  -     methylphenidate (Concerta) 54 MG CR tablet; Take 1 tablet by mouth Daily for 30 days  Dispense: 30 tablet; Refill: 0    3. Attention deficit hyperactivity disorder (ADHD), unspecified ADHD type  -     methylphenidate (Concerta) 54 MG CR tablet; Take 1 tablet by mouth Daily for 30 days  Dispense: 30 tablet; Refill: 0    4. Generalized anxiety disorder        -Ramirez reviewed and appropriate  -Continue trazodone 200 mg nightly for sleep  -Continue Abilify 5 mg p.o. daily for depression  -Continue Inderal LA 80 mg daily for anxiety   -Continue Concerta 54 mg p.o. every morning for ADHD  -Continue guanfacine 0.5 mg p.o. daily midday for anxiety, attention and focus  -Medications sent to pharmacy at this time    Discussed medication and psychotherapy options.Her anxiety appears to be situational, likely exacerbated by her work environment.  The current symptoms may be more related to situational factors rather than anxiety or depression. She is advised to continue her current medication regimen, with the exception of reducing her Abilify dosage by half for a week to see if it helps with restlessness. If her symptoms worsen, she should revert to her original Abilify dosage and contact the office. She is to continue trazodone, Inderal LA, and Concerta without change. Reiterated side effects that can be seen with each medication individually and in combination. Discussed the risks, benefits, and side effects of the medication; client acknowledged  and verbally consented.  Patient is aware to contact the [facility] with any worsening of symptom.  Patient is agreeable to go to the ER or call 911 should they begin SI/HI.      Return in about 4 weeks (around 5/29/2025), or if symptoms worsen or fail to improve, for Next scheduled follow up.          This document has been electronically signed by KANDY Reyes   May 1, 2025 17:23 EDT  Please note that portions of this note were completed with a voice recognition program  Patient or patient representative verbalized consent for the use of Ambient Listening during the visit with  KANDY Reyes for chart documentation. 5/1/2025  12:22 EST

## 2025-05-06 ENCOUNTER — HOSPITAL ENCOUNTER (OUTPATIENT)
Facility: HOSPITAL | Age: 35
Discharge: HOME | End: 2025-05-06
Payer: COMMERCIAL

## 2025-05-06 ENCOUNTER — TRANSCRIBE ORDERS (OUTPATIENT)
Dept: ADMINISTRATIVE | Facility: HOSPITAL | Age: 35
End: 2025-05-06
Payer: COMMERCIAL

## 2025-05-06 ENCOUNTER — HOSPITAL ENCOUNTER (OUTPATIENT)
Facility: HOSPITAL | Age: 35
End: 2025-05-06
Payer: COMMERCIAL

## 2025-05-06 ENCOUNTER — HOSPITAL ENCOUNTER (OUTPATIENT)
Dept: RESPIRATORY THERAPY | Facility: HOSPITAL | Age: 35
Discharge: HOME | End: 2025-05-06
Payer: COMMERCIAL

## 2025-05-06 DIAGNOSIS — K76.0 FATTY METAMORPHOSIS OF LIVER: ICD-10-CM

## 2025-05-06 DIAGNOSIS — K76.0 FATTY LIVER: ICD-10-CM

## 2025-05-06 DIAGNOSIS — K76.0 FATTY METAMORPHOSIS OF LIVER: Primary | ICD-10-CM

## 2025-05-06 LAB
ALBUMIN SERPL-MCNC: 4.2 G/DL (ref 3.5–5.2)
ALBUMIN/GLOB SERPL: 1.8 G/DL
ALP SERPL-CCNC: 35 U/L (ref 39–117)
ALT SERPL W P-5'-P-CCNC: 14 U/L (ref 1–33)
ANION GAP SERPL CALCULATED.3IONS-SCNC: 9 MMOL/L (ref 5–15)
AST SERPL-CCNC: 15 U/L (ref 1–32)
BASOPHILS # BLD AUTO: 0.04 10*3/MM3 (ref 0–0.2)
BASOPHILS NFR BLD AUTO: 0.9 % (ref 0–1.5)
BILIRUB SERPL-MCNC: 0.2 MG/DL (ref 0–1.2)
BUN SERPL-MCNC: 8 MG/DL (ref 6–20)
BUN/CREAT SERPL: 8.3 (ref 7–25)
CALCIUM SPEC-SCNC: 8.9 MG/DL (ref 8.6–10.5)
CHLORIDE SERPL-SCNC: 103 MMOL/L (ref 98–107)
CHOLEST SERPL-MCNC: 236 MG/DL (ref 0–200)
CO2 SERPL-SCNC: 25 MMOL/L (ref 22–29)
CREAT SERPL-MCNC: 0.96 MG/DL (ref 0.57–1)
DEPRECATED RDW RBC AUTO: 41.7 FL (ref 37–54)
EGFRCR SERPLBLD CKD-EPI 2021: 79.8 ML/MIN/1.73
EOSINOPHIL # BLD AUTO: 0.1 10*3/MM3 (ref 0–0.4)
EOSINOPHIL NFR BLD AUTO: 2.2 % (ref 0.3–6.2)
ERYTHROCYTE [DISTWIDTH] IN BLOOD BY AUTOMATED COUNT: 11.7 % (ref 12.3–15.4)
GLOBULIN UR ELPH-MCNC: 2.4 GM/DL
GLUCOSE SERPL-MCNC: 94 MG/DL (ref 65–99)
HAV IGM SERPL QL IA: NORMAL
HBV CORE IGM SERPL QL IA: NORMAL
HBV SURFACE AG SERPL QL IA: NORMAL
HCT VFR BLD AUTO: 41 % (ref 34–46.6)
HCV AB SER QL: NORMAL
HDLC SERPL-MCNC: 76 MG/DL (ref 40–60)
HGB BLD-MCNC: 13.3 G/DL (ref 12–15.9)
IMM GRANULOCYTES # BLD AUTO: 0.01 10*3/MM3 (ref 0–0.05)
IMM GRANULOCYTES NFR BLD AUTO: 0.2 % (ref 0–0.5)
LDLC SERPL CALC-MCNC: 140 MG/DL (ref 0–100)
LDLC/HDLC SERPL: 1.81 {RATIO}
LYMPHOCYTES # BLD AUTO: 1.52 10*3/MM3 (ref 0.7–3.1)
LYMPHOCYTES NFR BLD AUTO: 33.1 % (ref 19.6–45.3)
MCH RBC QN AUTO: 31.5 PG (ref 26.6–33)
MCHC RBC AUTO-ENTMCNC: 32.4 G/DL (ref 31.5–35.7)
MCV RBC AUTO: 97.2 FL (ref 79–97)
MONOCYTES # BLD AUTO: 0.34 10*3/MM3 (ref 0.1–0.9)
MONOCYTES NFR BLD AUTO: 7.4 % (ref 5–12)
NEUTROPHILS NFR BLD AUTO: 2.58 10*3/MM3 (ref 1.7–7)
NEUTROPHILS NFR BLD AUTO: 56.2 % (ref 42.7–76)
NRBC BLD AUTO-RTO: 0 /100 WBC (ref 0–0.2)
PLATELET # BLD AUTO: 249 10*3/MM3 (ref 140–450)
PMV BLD AUTO: 10 FL (ref 6–12)
POTASSIUM SERPL-SCNC: 3.9 MMOL/L (ref 3.5–5.2)
PROT SERPL-MCNC: 6.6 G/DL (ref 6–8.5)
QT INTERVAL: 380 MS
QTC INTERVAL: 410 MS
RBC # BLD AUTO: 4.22 10*6/MM3 (ref 3.77–5.28)
SODIUM SERPL-SCNC: 137 MMOL/L (ref 136–145)
TRIGL SERPL-MCNC: 114 MG/DL (ref 0–150)
TSH SERPL DL<=0.05 MIU/L-ACNC: 2.69 UIU/ML (ref 0.27–4.2)
VLDLC SERPL-MCNC: 20 MG/DL (ref 5–40)
WBC NRBC COR # BLD AUTO: 4.59 10*3/MM3 (ref 3.4–10.8)

## 2025-05-06 PROCEDURE — 80074 ACUTE HEPATITIS PANEL: CPT | Performed by: NURSE PRACTITIONER

## 2025-05-06 PROCEDURE — 36415 COLL VENOUS BLD VENIPUNCTURE: CPT | Performed by: NURSE PRACTITIONER

## 2025-05-06 PROCEDURE — 80061 LIPID PANEL: CPT | Performed by: NURSE PRACTITIONER

## 2025-05-06 PROCEDURE — 93005 ELECTROCARDIOGRAM TRACING: CPT | Performed by: NURSE PRACTITIONER

## 2025-05-06 PROCEDURE — 76700 US EXAM ABDOM COMPLETE: CPT

## 2025-05-06 PROCEDURE — 80050 GENERAL HEALTH PANEL: CPT | Performed by: NURSE PRACTITIONER

## 2025-05-06 PROCEDURE — 71046 X-RAY EXAM CHEST 2 VIEWS: CPT | Performed by: RADIOLOGY

## 2025-05-06 PROCEDURE — 76700 US EXAM ABDOM COMPLETE: CPT | Performed by: RADIOLOGY

## 2025-05-06 PROCEDURE — 71046 X-RAY EXAM CHEST 2 VIEWS: CPT

## 2025-05-07 ENCOUNTER — TELEPHONE (OUTPATIENT)
Dept: FAMILY MEDICINE CLINIC | Facility: CLINIC | Age: 35
End: 2025-05-07
Payer: COMMERCIAL

## 2025-05-29 ENCOUNTER — TELEPHONE (OUTPATIENT)
Dept: CARDIOLOGY | Facility: CLINIC | Age: 35
End: 2025-05-29

## 2025-05-29 ENCOUNTER — OFFICE VISIT (OUTPATIENT)
Dept: CARDIOLOGY | Facility: CLINIC | Age: 35
End: 2025-05-29
Payer: COMMERCIAL

## 2025-05-29 VITALS
BODY MASS INDEX: 20.46 KG/M2 | OXYGEN SATURATION: 97 % | HEIGHT: 65 IN | WEIGHT: 122.8 LBS | RESPIRATION RATE: 16 BRPM | DIASTOLIC BLOOD PRESSURE: 91 MMHG | HEART RATE: 87 BPM | SYSTOLIC BLOOD PRESSURE: 117 MMHG

## 2025-05-29 DIAGNOSIS — E78.5 DYSLIPIDEMIA: ICD-10-CM

## 2025-05-29 DIAGNOSIS — R55 SYNCOPE AND COLLAPSE: Primary | ICD-10-CM

## 2025-05-29 DIAGNOSIS — R00.2 PALPITATIONS: ICD-10-CM

## 2025-05-29 PROCEDURE — 99214 OFFICE O/P EST MOD 30 MIN: CPT | Performed by: NURSE PRACTITIONER

## 2025-05-29 RX ORDER — METOPROLOL TARTRATE 50 MG
50 TABLET ORAL ONCE
Qty: 1 TABLET | Refills: 0 | Status: SHIPPED | OUTPATIENT
Start: 2025-05-29 | End: 2025-05-29

## 2025-05-29 RX ORDER — SODIUM CHLORIDE 0.9 % (FLUSH) 0.9 %
10 SYRINGE (ML) INJECTION AS NEEDED
OUTPATIENT
Start: 2025-05-29

## 2025-05-29 RX ORDER — SODIUM CHLORIDE 9 MG/ML
40 INJECTION, SOLUTION INTRAVENOUS AS NEEDED
OUTPATIENT
Start: 2025-05-29

## 2025-05-29 RX ORDER — NITROGLYCERIN 0.4 MG/1
0.4 TABLET SUBLINGUAL
OUTPATIENT
Start: 2025-05-29 | End: 2025-05-29

## 2025-05-29 RX ORDER — SODIUM CHLORIDE 0.9 % (FLUSH) 0.9 %
10 SYRINGE (ML) INJECTION EVERY 12 HOURS SCHEDULED
OUTPATIENT
Start: 2025-05-29

## 2025-05-29 RX ORDER — SIMVASTATIN 10 MG
10 TABLET ORAL NIGHTLY
COMMUNITY
Start: 2025-05-07

## 2025-05-29 NOTE — PROGRESS NOTES
[facility]             [facility] KANDY Vidal Jill L, APRN  Dominique De Dios  1990 05/29/2025    Patient Active Problem List   Diagnosis    Generalized anxiety disorder    Black stools    Epigastric pain    Precordial chest pain    Gastroesophageal reflux disease    Belching    Nausea and vomiting    Shortness of breath    Dyslipidemia    Factor 5 Leiden mutation, heterozygous    Steatosis of liver       Dear Asiya Kern APRN:    Subjective     Chief Complaint   Patient presents with    Syncope     APPROX 1 MONTH AGO    Med Management     VERBAL       HPI:     This is a 35 y.o. female with known past medical history of generalized anxiety disorder, GERD, dyslipidemia and factor V mutation.       Dominique De Dios presents today for routine cardiology follow up.   History of Present Illness  Patient has not been seen in our office since 2023.  At her last visit a event monitor was ordered to rule out arrhythmias and assess possible PVC burden along with CT coronary angiogram to rule out any ischemia causing her chest pain.  [place] angiogram was not obtained by patient.     Patient was referred back to our office by her PCP due to recurrent syncope.  She states over the last 6 months she has had 5-6 episodes of syncope.  They occur at random times.  States she will get a sudden onset of dizziness and tunnel vision and then passes out for few minutes.  Denies bowel or bladder control.  Does report palpitations prior to the episode.  She is unsure if this is possibly vasovagal in nature.  Patient states she has not had any medication changes the last 6 months other than addition of simvastatin due to elevated LDL in the 140s.  Has been on Abilify and trazodone for a long time.  Does have a history of factor V.  Recent lab work stable.      Diagnostic Testing  Echocardiogram 1/2022: EF 61 to 65%  Treadmill stress test 1/2022: Findings consistent with a normal  ECG stress test       All other systems were reviewed and were negative.    Patient Active Problem List   Diagnosis    Generalized anxiety disorder    Black stools    Epigastric pain    Precordial chest pain    Gastroesophageal reflux disease    Belching    Nausea and vomiting    Shortness of breath    Dyslipidemia    Factor 5 Leiden mutation, heterozygous    Steatosis of liver       family history includes Breast cancer in her maternal grandmother; Heart attack in her father; Heart disease in her father; Stroke in her father.     reports that she has never smoked. She has never used smokeless tobacco. She reports that she does not drink alcohol and does not use drugs.    Allergies   Allergen Reactions    Ceclor [Cefaclor] Rash     childhood         Current Outpatient Medications:     ARIPiprazole (Abilify) 5 MG tablet, Take 1 tablet by mouth Daily for 180 days., Disp: 90 tablet, Rfl: 1    methylphenidate (Concerta) 54 MG CR tablet, Take 1 tablet by mouth Daily for 30 days, Disp: 30 tablet, Rfl: 0    propranolol LA (INDERAL LA) 80 MG 24 hr capsule, Take 1 capsule by mouth Daily for 90 days., Disp: 90 capsule, Rfl: 0    simvastatin (ZOCOR) 10 MG tablet, Take 1 tablet by mouth Every Night., Disp: , Rfl:     traZODone (DESYREL) 100 MG tablet, Take 2 tablets by mouth Every Night for 180 days., Disp: 180 tablet, Rfl: 1    guanFACINE (TENEX) 1 MG tablet, Take 0.5 tablets by mouth Every Night for 60 days., Disp: 15 tablet, Rfl: 1    metoprolol tartrate (LOPRESSOR) 50 MG tablet, Take 1 tablet by mouth 1 (One) Time for 1 dose. Take 50 mg One (1) Hour Prior to Coronary CTA, Disp: 1 tablet, Rfl: 0      Physical Exam:  I have reviewed the patient's current vital signs as documented in the patient's EMR.   Vitals:    05/29/25 1041   BP: 117/91   Pulse: 87   Resp: 16   SpO2: 97%     Body mass index is 20.43 kg/m².       05/29/25  1041   Weight: 55.7 kg (122 lb 12.8 oz)      Physical Exam  Constitutional:       General: She is not  in acute distress.     Appearance: Normal appearance. She is well-developed and normal weight.   HENT:      Head: Normocephalic and atraumatic.   Eyes:      General: Lids are normal.      Conjunctiva/sclera: Conjunctivae normal.      Pupils: Pupils are equal, round, and reactive to light.   Neck:      Vascular: No carotid bruit or JVD.   Cardiovascular:      Rate and Rhythm: Normal rate and regular rhythm.      Pulses: Normal pulses.      Heart sounds: Normal heart sounds, S1 normal and S2 normal. No murmur heard.  Pulmonary:      Effort: Pulmonary effort is normal. No respiratory distress.      Breath sounds: Normal breath sounds. No wheezing.   Abdominal:      General: Bowel sounds are normal. There is no distension.      Palpations: Abdomen is soft. There is no hepatomegaly or splenomegaly.      Tenderness: There is no abdominal tenderness.   Musculoskeletal:         General: No swelling. Normal range of motion.      Cervical back: Normal range of motion and neck supple.      Right lower leg: No edema.      Left lower leg: No edema.   Skin:     General: Skin is warm and dry.      Coloration: Skin is not jaundiced.      Findings: No rash.   Neurological:      General: No focal deficit present.      Mental Status: She is alert and oriented to person, place, and time. Mental status is at baseline.   Psychiatric:         Mood and Affect: Mood normal.         Speech: Speech normal.         Behavior: Behavior normal.         Thought Content: Thought content normal.         Judgment: Judgment normal.            DATA REVIEWED:     TTE/NIKKI:  Results for orders placed during the hospital encounter of 01/11/23    Adult Transthoracic Echo Complete w/ Color, Spectral and Contrast if necessary per protocol    Interpretation Summary    Left ventricular systolic function is normal. Left ventricular ejection fraction appears to be 61 - 65%.    Left ventricular diastolic function is consistent with (grade I) impaired  "relaxation.      Laboratory evaluations:    Lab Results   Component Value Date    GLUCOSE 94 05/06/2025    BUN 8 05/06/2025    CREATININE 0.96 05/06/2025    EGFRIFNONA 70 01/13/2021    EGFRIFAFRI  08/29/2016      Comment:      <15 Indicative of kidney failure.    BCR 8.3 05/06/2025    K 3.9 05/06/2025    CO2 25.0 05/06/2025    CALCIUM 8.9 05/06/2025    ALBUMIN 4.2 05/06/2025    AST 15 05/06/2025    ALT 14 05/06/2025     Lab Results   Component Value Date    WBC 4.59 05/06/2025    HGB 13.3 05/06/2025    HCT 41.0 05/06/2025    MCV 97.2 (H) 05/06/2025     05/06/2025     Lab Results   Component Value Date    CHOL 236 (H) 05/06/2025    TRIG 114 05/06/2025    HDL 76 (H) 05/06/2025     (H) 05/06/2025     Lab Results   Component Value Date    TSH 2.690 05/06/2025     Lab Results   Component Value Date    HGBA1C 4.60 (L) 02/13/2025     Lab Results   Component Value Date    ALT 14 05/06/2025     Lab Results   Component Value Date    HGBA1C 4.60 (L) 02/13/2025    HGBA1C 4.70 (L) 09/18/2023     Lab Results   Component Value Date    CREATININE 0.96 05/06/2025     Lab Results   Component Value Date    IRON 90 09/18/2023    TIBC 404 09/18/2023     Lab Results   Component Value Date    INR 0.91 01/13/2021    INR 0.96 03/20/2019    PROTIME 12.1 01/13/2021    PROTIME 13.0 03/20/2019      No components found for: \"ABSOLUTELUNG\"    --------------------------------------------------------------------------------------------------------------------------    ASSESSMENT/PLAN:      Diagnosis Plan   1. Syncope and collapse  CT Angiogram Coronary    nitroglycerin (NITROSTAT) SL tablet 0.4 mg    No Caffeine or Nicotine 4 Hours Prior to CTA Appointment    Nothing to Eat or Drink 4 Hours Prior to CTA Appointment    Do Not Take Phosphodiasterase Inhibitors in the 72 Hours Prior to Coronary CTA    Obtain Informed Consent - Computed Tomography Angiography of Chest - Angiogram of Coronary Arteries    Vital Signs Upon Arrival    Cardiac " Monitoring    Verify NPO Status - Patient to Be NPO at Least 4 Hours Prior to CTA    Notify CT After Administration of metoprolol tartrate (LOPRESSOR) tablet    Notify Provider If Total Metoprolol Given Equals 300mg & Heart Rate Not At Goal    Notify Provider Prior to Administration of Nitroglycerin if Patient SBP <80    POC Creatinine    Insert Peripheral IV    Saline Lock & Maintain IV Access    sodium chloride 0.9 % flush 10 mL    sodium chloride 0.9 % flush 10 mL    sodium chloride 0.9 % infusion 40 mL    Vital Signs - See Instructions    Hold Medication Metformin (Glucophage, Glucophage XR, Fortament, Glumetza);  Metglip (metformin/glipizide);  Glucovance (metformin/glyburide); Avandamet (metformin/rosiglitazone)    Patient May Discharge Home After Procedure Complete (If Stable)    metoprolol tartrate (LOPRESSOR) 50 MG tablet    Cardiac Event Monitor (MUKESH) or Mobile Cardiac Outpatient Telemetry (MCT)    Adult Transthoracic Echo Complete W/ Cont if Necessary Per Protocol      2. Palpitations  Cardiac Event Monitor (MUKESH) or Mobile Cardiac Outpatient Telemetry (MCT)    Adult Transthoracic Echo Complete W/ Cont if Necessary Per Protocol        Assessment & Plan  1. Syncope  2. Palpitations  -Symptoms suggest a recurrence of vasovagal syncope. Cardiac causes such as valvular disease, arrhythmias and ischemia cannot be ruled out. Medications can also contribute to syncope for which we discussed. Per research, abilify and trazodone both can cause syncope and orthostati hypotension.   - Initiate a 30-day heart monitor today to identify any rhythm abnormalities associated with syncopal episodes.  - Perform an echocardiogram to assess cardiac function and valvular function.  - Conduct a CT coronary angiogram to evaluate coronary arteries for ischemia.   - Orthostatic vitals obtained In the office today and negative.   - Maintain a log of activities and environment during syncopal episodes to identify potential  triggers.  - Lifestyle modifications: daily water intake of 3 liters, salt intake of 8 to 12 grams, and regular exercise.  - Suggest the use of compression stockings.    2.Dyslipidemia  -Elevated LDL cholesterol level.. Cholesterol medication has been prescribed by PCP but not yet started.      This document has been @Electronically signed by KANDY Boswell, 05/29/25, 8:38 AM EDT.       Dictated Utilizing Dragon Dictation: Part of this note may be an electronic transcription/translation of spoken language to printed text using the Dragon Dictation System.  Patient or patient representative verbalized consent for the use of Ambient Listening during the visit with  KANDY Boswell for chart documentation. 5/29/2025  11:15 EDT    Follow-up appointment and medication changes provided in hand delivered After Visit Summary as well as reviewed in the room.

## 2025-05-29 NOTE — TELEPHONE ENCOUNTER
"  Caller: Dominique De Dios    Relationship: Self    Best call back number: [phone]    What is the best time to reach you: AFTER 10    Who are you requesting to speak with (clinical staff, provider,  specific staff member): CLINICAL    What was the call regarding: PATIENT CALLED BECAUSE HER HEART MONITOR SAYS \"WAITING FOR PRESCRIPTION\" AND IT ALSO KEEPS DISCONNECTING AND RECONNECTING. IT ALSO SAID \"POOR SKIN CONTACT\" AND SHE TRIED PUSHING IT DOWN MORE, BUT SHE IS UNSURE IF IT SOLVED THE PROBLEM OR NOT. PLEASE CALL HER BACK WHEN POSSIBLE TO ADVISE ABOUT THESE ISSUES. THANK YOU    Is it okay if the provider responds through [email or web address]: PLEASE CALL    "

## 2025-06-03 DIAGNOSIS — F90.9 ATTENTION DEFICIT HYPERACTIVITY DISORDER (ADHD), UNSPECIFIED ADHD TYPE: ICD-10-CM

## 2025-06-03 DIAGNOSIS — F33.41 RECURRENT MAJOR DEPRESSIVE DISORDER, IN PARTIAL REMISSION: ICD-10-CM

## 2025-06-03 RX ORDER — METHYLPHENIDATE HYDROCHLORIDE 54 MG/1
54 TABLET ORAL DAILY
Qty: 30 TABLET | Refills: 0 | Status: SHIPPED | OUTPATIENT
Start: 2025-06-07 | End: 2025-07-07

## 2025-06-30 ENCOUNTER — TELEPHONE (OUTPATIENT)
Dept: CARDIOLOGY | Facility: CLINIC | Age: 35
End: 2025-06-30
Payer: COMMERCIAL

## 2025-06-30 NOTE — TELEPHONE ENCOUNTER
Patient called and wanted to let Jelena know that she cannot get her test done due to the cost. She was suppose to have echo and CT she said. She is going to cancel them for the time being.     Thanks!

## 2025-07-08 ENCOUNTER — TELEPHONE (OUTPATIENT)
Dept: CARDIOLOGY | Facility: CLINIC | Age: 35
End: 2025-07-08
Payer: COMMERCIAL

## 2025-07-08 DIAGNOSIS — F90.9 ATTENTION DEFICIT HYPERACTIVITY DISORDER (ADHD), UNSPECIFIED ADHD TYPE: ICD-10-CM

## 2025-07-08 DIAGNOSIS — F41.1 GENERALIZED ANXIETY DISORDER: ICD-10-CM

## 2025-07-08 DIAGNOSIS — F33.41 RECURRENT MAJOR DEPRESSIVE DISORDER, IN PARTIAL REMISSION: ICD-10-CM

## 2025-07-08 DIAGNOSIS — F43.10 POST TRAUMATIC STRESS DISORDER (PTSD): ICD-10-CM

## 2025-07-08 RX ORDER — METHYLPHENIDATE HYDROCHLORIDE 54 MG/1
54 TABLET ORAL DAILY
Qty: 30 TABLET | Refills: 0 | Status: SHIPPED | OUTPATIENT
Start: 2025-07-08 | End: 2025-08-07

## 2025-07-08 RX ORDER — PROPRANOLOL HYDROCHLORIDE 80 MG/1
80 CAPSULE, EXTENDED RELEASE ORAL DAILY
Qty: 30 CAPSULE | Refills: 0 | Status: SHIPPED | OUTPATIENT
Start: 2025-07-08 | End: 2025-10-06

## 2025-07-16 ENCOUNTER — OFFICE VISIT (OUTPATIENT)
Dept: CARDIOLOGY | Facility: CLINIC | Age: 35
End: 2025-07-16
Payer: COMMERCIAL

## 2025-07-16 VITALS
RESPIRATION RATE: 16 BRPM | SYSTOLIC BLOOD PRESSURE: 105 MMHG | DIASTOLIC BLOOD PRESSURE: 75 MMHG | BODY MASS INDEX: 20.69 KG/M2 | HEIGHT: 65 IN | WEIGHT: 124.2 LBS | OXYGEN SATURATION: 97 % | HEART RATE: 92 BPM

## 2025-07-16 DIAGNOSIS — R55 SYNCOPE AND COLLAPSE: ICD-10-CM

## 2025-07-16 DIAGNOSIS — I49.3 PVC'S (PREMATURE VENTRICULAR CONTRACTIONS): ICD-10-CM

## 2025-07-16 DIAGNOSIS — E78.5 DYSLIPIDEMIA: Primary | ICD-10-CM

## 2025-07-16 PROCEDURE — 99214 OFFICE O/P EST MOD 30 MIN: CPT | Performed by: NURSE PRACTITIONER

## 2025-07-16 NOTE — PROGRESS NOTES
[facility]             [facility] KANDY Vidal Jill L, APRN  Dominique De Dios  1990 07/16/2025    Patient Active Problem List   Diagnosis    Syncope and collapse    Generalized anxiety disorder    Black stools    Epigastric pain    Precordial chest pain    Gastroesophageal reflux disease    Belching    Nausea and vomiting    Shortness of breath    Dyslipidemia    Factor 5 Leiden mutation, heterozygous    Steatosis of liver    PVC's (premature ventricular contractions)       Dear Asiya Kern APRN:    Subjective     Chief Complaint   Patient presents with    Follow-up     Event monitor findings    Med Management     verbal       HPI:     This is a 35 y.o. female with known past medical history of generalized anxiety disorder, GERD, dyslipidemia and factor V mutation.       Dominique De Dios presents today for routine cardiology follow up.   History of Present Illness  Patient states since her last visit she has been doing some better and has not had any further syncopal episodes.  She was unable to obtain echocardiogram or CT scan due to cost.  Did have a cardiac event monitor which showed predominant normal sinus rhythm with average heart rate of 80 bpm with sinus tachycardia up to 146 bpm and rare PVCs.  Patient states she has increased her water and salt intake with some improvement.  Blood pressure controlled.  On propranolol.       Diagnostic Testing  Echocardiogram 1/2022: EF 61 to 65%  Treadmill stress test 1/2022: Findings consistent with a normal ECG stress test       All other systems were reviewed and were negative.    Patient Active Problem List   Diagnosis    Syncope and collapse    Generalized anxiety disorder    Black stools    Epigastric pain    Precordial chest pain    Gastroesophageal reflux disease    Belching    Nausea and vomiting    Shortness of breath    Dyslipidemia    Factor 5 Leiden mutation, heterozygous    Steatosis of liver     PVC's (premature ventricular contractions)       family history includes Breast cancer in her maternal grandmother; Heart attack in her father; Heart disease in her father; Stroke in her father.     reports that she has never smoked. She has never used smokeless tobacco. She reports that she does not drink alcohol and does not use drugs.    Allergies   Allergen Reactions    Ceclor [Cefaclor] Rash     childhood         Current Outpatient Medications:     ARIPiprazole (Abilify) 5 MG tablet, Take 1 tablet by mouth Daily for 180 days., Disp: 90 tablet, Rfl: 1    methylphenidate (Concerta) 54 MG CR tablet, Take 1 tablet by mouth Daily for 30 days, Disp: 30 tablet, Rfl: 0    propranolol LA (INDERAL LA) 80 MG 24 hr capsule, Take 1 capsule by mouth Daily for 90 days., Disp: 30 capsule, Rfl: 0    simvastatin (ZOCOR) 10 MG tablet, Take 1 tablet by mouth Every Night., Disp: , Rfl:     traZODone (DESYREL) 100 MG tablet, Take 2 tablets by mouth Every Night for 180 days., Disp: 180 tablet, Rfl: 1    guanFACINE (TENEX) 1 MG tablet, Take 0.5 tablets by mouth Every Night for 60 days., Disp: 15 tablet, Rfl: 1    metoprolol tartrate (LOPRESSOR) 50 MG tablet, Take 1 tablet by mouth 1 (One) Time for 1 dose. Take 50 mg One (1) Hour Prior to Coronary CTA, Disp: 1 tablet, Rfl: 0      Physical Exam:  I have reviewed the patient's current vital signs as documented in the patient's EMR.   Vitals:    07/16/25 1313   BP: 105/75   Pulse: 92   Resp: 16   SpO2: 97%     Body mass index is 20.67 kg/m².       07/16/25  1313   Weight: 56.3 kg (124 lb 3.2 oz)      Physical Exam  Constitutional:       General: She is not in acute distress.     Appearance: Normal appearance. She is well-developed and normal weight.   HENT:      Head: Normocephalic and atraumatic.   Eyes:      General: Lids are normal.      Conjunctiva/sclera: Conjunctivae normal.      Pupils: Pupils are equal, round, and reactive to light.   Neck:      Vascular: No carotid bruit or JVD.    Cardiovascular:      Rate and Rhythm: Normal rate and regular rhythm.      Pulses: Normal pulses.      Heart sounds: Normal heart sounds, S1 normal and S2 normal. No murmur heard.  Pulmonary:      Effort: Pulmonary effort is normal. No respiratory distress.      Breath sounds: Normal breath sounds. No wheezing.   Abdominal:      General: Bowel sounds are normal. There is no distension.      Palpations: Abdomen is soft. There is no hepatomegaly or splenomegaly.      Tenderness: There is no abdominal tenderness.   Musculoskeletal:         General: No swelling. Normal range of motion.      Cervical back: Normal range of motion and neck supple.      Right lower leg: No edema.      Left lower leg: No edema.   Skin:     General: Skin is warm and dry.      Coloration: Skin is not jaundiced.      Findings: No rash.   Neurological:      General: No focal deficit present.      Mental Status: She is alert and oriented to person, place, and time. Mental status is at baseline.   Psychiatric:         Mood and Affect: Mood normal.         Speech: Speech normal.         Behavior: Behavior normal.         Thought Content: Thought content normal.         Judgment: Judgment normal.          DATA REVIEWED:     TTE/NIKKI:  Results for orders placed during the hospital encounter of 01/11/23    Adult Transthoracic Echo Complete w/ Color, Spectral and Contrast if necessary per protocol    Interpretation Summary    Left ventricular systolic function is normal. Left ventricular ejection fraction appears to be 61 - 65%.    Left ventricular diastolic function is consistent with (grade I) impaired relaxation.      Laboratory evaluations:    Lab Results   Component Value Date    GLUCOSE 94 05/06/2025    BUN 8 05/06/2025    CREATININE 0.96 05/06/2025    EGFRIFNONA 70 01/13/2021    EGFRIFAFRI  08/29/2016      Comment:      <15 Indicative of kidney failure.    BCR 8.3 05/06/2025    K 3.9 05/06/2025    CO2 25.0 05/06/2025    CALCIUM 8.9 05/06/2025  "   ALBUMIN 4.2 05/06/2025    AST 15 05/06/2025    ALT 14 05/06/2025     Lab Results   Component Value Date    WBC 4.59 05/06/2025    HGB 13.3 05/06/2025    HCT 41.0 05/06/2025    MCV 97.2 (H) 05/06/2025     05/06/2025     Lab Results   Component Value Date    CHOL 236 (H) 05/06/2025    TRIG 114 05/06/2025    HDL 76 (H) 05/06/2025     (H) 05/06/2025     Lab Results   Component Value Date    TSH 2.690 05/06/2025     Lab Results   Component Value Date    HGBA1C 4.60 (L) 02/13/2025     Lab Results   Component Value Date    ALT 14 05/06/2025     Lab Results   Component Value Date    HGBA1C 4.60 (L) 02/13/2025    HGBA1C 4.70 (L) 09/18/2023     Lab Results   Component Value Date    CREATININE 0.96 05/06/2025     Lab Results   Component Value Date    IRON 90 09/18/2023    TIBC 404 09/18/2023     Lab Results   Component Value Date    INR 0.91 01/13/2021    INR 0.96 03/20/2019    PROTIME 12.1 01/13/2021    PROTIME 13.0 03/20/2019      No components found for: \"ABSOLUTELUNG\"    --------------------------------------------------------------------------------------------------------------------------    ASSESSMENT/PLAN:      Diagnosis Plan   1. Dyslipidemia  Lipid Panel      2. Syncope and collapse        3. PVC's (premature ventricular contractions)          Assessment & Plan  Syncope  PVCs  Patient has had no further syncopal episodes since her last visit.  Was unable to obtain echocardiogram or CT coronary angiogram due to cost.  Did have cardiac event monitor which showed normal sinus rhythm with rare PVCs and no arrhythmias.  At the last visit I did recommend daily water intake of 3 L, salt intake of 8 to 12 g and regular exercise.  She states that since increasing water and salt intake she has seen some improvement.  Likely has underlying autonomic dysfunction and/or POTS.  Given some improvement we will continue with conservative management along with propranolol.  She is advised to decrease stress and " anxiety and avoid excessive heat.    3.  Dyslipidemia  Last lipid panel showed LDL in the 140s.  Patient was started on statin at that time.  She will obtain lipid panel in August to reevaluate      This document has been @Electronically signed by KANDY Boswell, 07/16/25, 12:18 PM EDT.     Advance Care Planning   ACP discussion was declined by the patient. Patient does not have an advance directive, declines further assistance.      Dictated Utilizing Dragon Dictation: Part of this note may be an electronic transcription/translation of spoken language to printed text using the Dragon Dictation System.Patient or patient representative verbalized consent for the use of Ambient Listening during the visit with  KANDY Boswell for chart documentation. 7/16/2025  13:58 EDT    Follow-up appointment and medication changes provided in hand delivered After Visit Summary as well as reviewed in the room.

## 2025-07-23 ENCOUNTER — OFFICE VISIT (OUTPATIENT)
Dept: PSYCHIATRY | Facility: CLINIC | Age: 35
End: 2025-07-23
Payer: COMMERCIAL

## 2025-07-23 VITALS
HEIGHT: 65 IN | WEIGHT: 124.6 LBS | OXYGEN SATURATION: 98 % | HEART RATE: 87 BPM | DIASTOLIC BLOOD PRESSURE: 88 MMHG | SYSTOLIC BLOOD PRESSURE: 114 MMHG | BODY MASS INDEX: 20.76 KG/M2

## 2025-07-23 DIAGNOSIS — G47.9 SLEEPING DIFFICULTY: ICD-10-CM

## 2025-07-23 DIAGNOSIS — F41.1 GENERALIZED ANXIETY DISORDER: ICD-10-CM

## 2025-07-23 DIAGNOSIS — F33.41 RECURRENT MAJOR DEPRESSIVE DISORDER, IN PARTIAL REMISSION: ICD-10-CM

## 2025-07-23 DIAGNOSIS — F43.10 POST TRAUMATIC STRESS DISORDER (PTSD): ICD-10-CM

## 2025-07-23 DIAGNOSIS — F90.9 ATTENTION DEFICIT HYPERACTIVITY DISORDER (ADHD), UNSPECIFIED ADHD TYPE: ICD-10-CM

## 2025-07-23 PROCEDURE — 99214 OFFICE O/P EST MOD 30 MIN: CPT

## 2025-07-23 RX ORDER — METHYLPHENIDATE HYDROCHLORIDE 54 MG/1
54 TABLET ORAL DAILY
Qty: 30 TABLET | Refills: 0 | Status: SHIPPED | OUTPATIENT
Start: 2025-08-08 | End: 2025-09-07

## 2025-07-23 RX ORDER — METHYLPHENIDATE HYDROCHLORIDE 10 MG/1
10 TABLET ORAL DAILY
Qty: 30 TABLET | Refills: 0 | Status: SHIPPED | OUTPATIENT
Start: 2025-07-23 | End: 2025-08-22

## 2025-07-23 NOTE — PROGRESS NOTES
Subjective   Dominique De Dios is a 35 y.o. female who is here today for medication management follow up encounter.     Chief Complaint:  anxiety, depression,attention/focus deficits.    History of Present Illness  She denies negative side effects associated with current regimen. She reports an improvement in her condition since the last visit, with only one episode of physical symptoms. She reports that she was evaluated by cardiology whom has a suspicion of POTs. which are managed by increasing her salt and water intake. This approach has been beneficial. She has not used compression socks before, but is planning to get some before the return of school. She experienced a severe bout of anxiety and depression a few months ago, which lasted for several days before resolving. Currently, she is experiencing heightened anxiety due to the start of the school year as last year was very difficult within her [employer] classroom. She attempted to reduce her Abilify dosage by half, but this exacerbated her anxiety, prompting her to return to the full dose, improvement with return to higher dose though no dissapation. She also had a brief period off propranolol due to prescription issues, which worsened her symptoms. She identifies her cluttered house as a trigger for her anxiety and depression, as it overwhelms her and leads to a shutdown. Appetite has been doing okay. Body mass index is 20.73 kg/m². She believes her ADHD contributes to her difficulty in maintaining focus on tasks like cleaning. Her ADHD medication is effective, but she still struggles with focus and motivation, particularly in the afternoon. She takes her medication in the morning and finds it easier to accomplish tasks earlier in the day, although she still gets distracted and overwhelmed. Her sleep pattern remains unchanged, with frequent awakenings at night. She feels rested during the summer when she can sleep late, but is unsure how she will feel once school  resumes. She is no longer taking guanfacine. She denies AVH, SI, HI, SH.       Family Psychiatric History:  family history includes Breast cancer in her maternal grandmother; Heart attack in her father; Heart disease in her father; Stroke in her father.    Medical/Surgical History:  Past Medical History:   Diagnosis Date    Acid reflux     Anxiety     Elevated cholesterol     Hypertension     Migraine     Migraines     Urinary tract infection      Past Surgical History:   Procedure Laterality Date     SECTION Bilateral 2016    Procedure:  SECTION PRIMARY;  Surgeon: Valdez Bach DO;  Location: [facility] LABOR DELIVERY;  Service:        Allergies   Allergen Reactions    Ceclor [Cefaclor] Rash     childhood           Current Medications:   Current Outpatient Medications   Medication Sig Dispense Refill    ARIPiprazole (Abilify) 5 MG tablet Take 1 tablet by mouth Daily for 180 days. 90 tablet 1    [START ON 2025] methylphenidate (Concerta) 54 MG CR tablet Take 1 tablet by mouth Daily for 30 days 30 tablet 0    propranolol LA (INDERAL LA) 80 MG 24 hr capsule Take 1 capsule by mouth Daily for 90 days. 90 capsule 0    simvastatin (ZOCOR) 10 MG tablet Take 1 tablet by mouth Every Night.      traZODone (DESYREL) 100 MG tablet Take 2 tablets by mouth Every Night for 180 days. 180 tablet 1    methylphenidate (Ritalin) 10 MG tablet Take 1 tablet by mouth Daily for 30 days. 30 tablet 0     No current facility-administered medications for this visit.         Review of Systems   Constitutional:  Negative for activity change, appetite change, chills, diaphoresis, fatigue and fever.   HENT: Negative.  Negative for congestion and sore throat.    Eyes: Negative.    Respiratory: Negative.  Negative for cough.    Cardiovascular: Negative.  Negative for chest pain.   Gastrointestinal: Negative.  Negative for abdominal pain, nausea and vomiting.   Endocrine: Negative.    Genitourinary: Negative.  Negative  "for dysuria.   Musculoskeletal: Negative.  Negative for myalgias and neck pain.   Skin: Negative.  Negative for rash.   Allergic/Immunologic: Negative.    Neurological: Negative.  Negative for weakness, numbness and headaches.   Hematological: Negative.    Psychiatric/Behavioral:  Positive for decreased concentration. Negative for dysphoric mood, self-injury, sleep disturbance and suicidal ideas. The patient is nervous/anxious.     denies HEENT, cardiovascular, respiratory, liver, renal, GI/, endocrine, neuro, DERM, hematology, immunology, musculoskeletal disorders.    Objective   Physical Exam  Vitals reviewed.   Constitutional:       Appearance: Normal appearance.   HENT:      Head: Normocephalic.      Nose: Nose normal.      Mouth/Throat:      Mouth: Mucous membranes are moist.      Pharynx: Oropharynx is clear.   Eyes:      Extraocular Movements: Extraocular movements intact.      Pupils: Pupils are equal, round, and reactive to light.   Cardiovascular:      Rate and Rhythm: Normal rate.   Pulmonary:      Effort: Pulmonary effort is normal.   Musculoskeletal:         General: Normal range of motion.      Cervical back: Normal range of motion.   Skin:     General: Skin is warm and dry.   Neurological:      General: No focal deficit present.      Mental Status: She is alert and oriented to person, place, and time.   Psychiatric:         Attention and Perception: Attention and perception normal. She is attentive.         Mood and Affect: Affect normal. Mood is anxious. Mood is not depressed. Affect is not tearful.         Speech: Speech normal.         Behavior: Behavior normal. Behavior is cooperative.         Thought Content: Thought content normal.         Cognition and Memory: Cognition and memory normal.         Judgment: Judgment normal. Judgment is not impulsive.     Blood pressure 114/88, pulse 87, height 165.1 cm (65\"), weight 56.5 kg (124 lb 9.6 oz), SpO2 98%, not currently breastfeeding.    Mental " Status Exam:   Hygiene:   fair  Cooperation:  Cooperative  Eye Contact:  Fair  Psychomotor Behavior:  Appropriate  Affect: Appropriate  Hopelessness: Optimistic  Speech:  Normal  Thought Process:  Goal directed and Linear  Thought Content:  Normal and Mood congruent  Suicidal:  None  Homicidal:  None  Hallucinations:  None  Delusion:  None  Memory:  Intact  Orientation:  Person, Place, Time, and Situation  Reliability:  fair  Insight:  Fair  Judgement:  Fair  Impulse Control:  Fair  Physical/Medical Issues:  No       Short-term goals: Patient will be compliant with clinic appointments.  Patient will be engaged in therapy, medication compliant with minimal side effects. Patient  will report decrease of symptoms and frequency.    Long-term goals: Patient will have minimal symptoms of  with continued medication management. Patient will be compliant with treatment and appointments.     Strengths: Motivation for treatment, insight  Weaknesses: Coping skills, support    Prognosis: Guarded dependent on medication/follow up and treatment plan compliance.  Functional Status: minimum impairment in areas of daily functioning.      Assessment & Plan   Diagnoses and all orders for this visit:    1. Recurrent major depressive disorder, in partial remission  -     methylphenidate (Concerta) 54 MG CR tablet; Take 1 tablet by mouth Daily for 30 days  Dispense: 30 tablet; Refill: 0    2. Post traumatic stress disorder (PTSD)  -     propranolol LA (INDERAL LA) 80 MG 24 hr capsule; Take 1 capsule by mouth Daily for 90 days.  Dispense: 90 capsule; Refill: 0    3. Generalized anxiety disorder  -     propranolol LA (INDERAL LA) 80 MG 24 hr capsule; Take 1 capsule by mouth Daily for 90 days.  Dispense: 90 capsule; Refill: 0    4. Sleeping difficulty    5. Attention deficit hyperactivity disorder (ADHD), unspecified ADHD type  -     methylphenidate (Concerta) 54 MG CR tablet; Take 1 tablet by mouth Daily for 30 days  Dispense: 30 tablet;  Refill: 0  -     methylphenidate (Ritalin) 10 MG tablet; Take 1 tablet by mouth Daily for 30 days.  Dispense: 30 tablet; Refill: 0          -Ramirez reviewed and appropriate  -Continue trazodone 200 mg nightly for sleep  -Increase Abilify to 7.5 mg p.o. daily for depression  -Continue Inderal LA 80 mg daily for anxiety   -Continue Concerta 54 mg p.o. every morning for ADHD  -Start Ritalin 10 mg PO every afternoon PRN for breakthrough symptoms.   -Discontinue guanfacine, stopped between encounters.  -Medications sent to pharmacy at this time    Discussed medication and psychotherapy options.The dosage of Abilify will be increased to 7.5 mg to see if it improves her anxiety. She was instructed to call on 08/30/2025 to provide an update on her condition. If there is no noticeable improvement, the dosage will be doubled. A midday dose of Ritalin 10 mg will be added to her current Concerta regimen to address the wearing off of effects in the afternoon. She was advised to take Ritalin no later than 4:00 PM to avoid sleep disruption. If she continues to struggle with attention and focus despite the additional Ritalin, a trial of Vyvanse or Adderall may be considered during the Nickie break. Reiterated side effects that can be seen with each medication individually and in combination. Discussed the risks, benefits, and side effects of the medication; client acknowledged and verbally consented.  Patient is aware to contact the [facility] with any worsening of symptom.  Patient is agreeable to go to the ER or call 911 should they begin SI/HI.      Return in about 3 months (around 10/23/2025), or if symptoms worsen or fail to improve, for Next scheduled follow up.          This document has been electronically signed by KANDY Reyes   July 24, 2025 14:11 EDT  Please note that portions of this note were completed with a voice recognition program  Patient or patient representative verbalized consent for the use of  Ambient Listening during the visit with  KANDY Reyes for chart documentation. 7/24/2025  12:22 EST

## 2025-07-24 RX ORDER — PROPRANOLOL HYDROCHLORIDE 80 MG/1
80 CAPSULE, EXTENDED RELEASE ORAL DAILY
Qty: 90 CAPSULE | Refills: 0 | Status: SHIPPED | OUTPATIENT
Start: 2025-07-24 | End: 2025-10-22

## 2025-08-05 DIAGNOSIS — F41.1 GENERALIZED ANXIETY DISORDER: ICD-10-CM

## 2025-08-05 DIAGNOSIS — F43.10 POST TRAUMATIC STRESS DISORDER (PTSD): ICD-10-CM

## 2025-08-05 RX ORDER — PROPRANOLOL HYDROCHLORIDE 80 MG/1
80 CAPSULE, EXTENDED RELEASE ORAL DAILY
Qty: 30 CAPSULE | Refills: 0 | OUTPATIENT
Start: 2025-08-05

## 2025-08-07 ENCOUNTER — TELEPHONE (OUTPATIENT)
Dept: FAMILY MEDICINE CLINIC | Facility: CLINIC | Age: 35
End: 2025-08-07
Payer: COMMERCIAL

## 2025-08-07 DIAGNOSIS — F43.10 POST TRAUMATIC STRESS DISORDER (PTSD): ICD-10-CM

## 2025-08-07 DIAGNOSIS — F41.1 GENERALIZED ANXIETY DISORDER: ICD-10-CM

## 2025-08-07 DIAGNOSIS — F33.41 RECURRENT MAJOR DEPRESSIVE DISORDER, IN PARTIAL REMISSION: ICD-10-CM

## 2025-08-07 RX ORDER — ARIPIPRAZOLE 10 MG/1
10 TABLET ORAL DAILY
Qty: 90 TABLET | Refills: 0 | Status: SHIPPED | OUTPATIENT
Start: 2025-08-07 | End: 2025-11-05

## 2025-08-22 ENCOUNTER — HOSPITAL ENCOUNTER (EMERGENCY)
Facility: HOSPITAL | Age: 35
Discharge: HOME | End: 2025-08-22
Payer: COMMERCIAL